# Patient Record
Sex: FEMALE | Race: WHITE | NOT HISPANIC OR LATINO | Employment: FULL TIME | URBAN - METROPOLITAN AREA
[De-identification: names, ages, dates, MRNs, and addresses within clinical notes are randomized per-mention and may not be internally consistent; named-entity substitution may affect disease eponyms.]

---

## 2017-01-26 ENCOUNTER — OFFICE VISIT (OUTPATIENT)
Dept: FAMILY MEDICINE | Facility: CLINIC | Age: 42
End: 2017-01-26
Payer: COMMERCIAL

## 2017-01-26 VITALS
DIASTOLIC BLOOD PRESSURE: 78 MMHG | OXYGEN SATURATION: 97 % | HEART RATE: 69 BPM | BODY MASS INDEX: 43.05 KG/M2 | WEIGHT: 243 LBS | HEIGHT: 63 IN | SYSTOLIC BLOOD PRESSURE: 122 MMHG | TEMPERATURE: 97.7 F

## 2017-01-26 DIAGNOSIS — L30.9 DERMATITIS: Primary | ICD-10-CM

## 2017-01-26 PROCEDURE — 99212 OFFICE O/P EST SF 10 MIN: CPT | Performed by: FAMILY MEDICINE

## 2017-01-26 RX ORDER — PREDNISONE 10 MG/1
TABLET ORAL
Qty: 24 TABLET | Refills: 0 | Status: SHIPPED | OUTPATIENT
Start: 2017-01-26 | End: 2017-03-15

## 2017-01-26 NOTE — MR AVS SNAPSHOT
After Visit Summary   1/26/2017    Jesica Mooney    MRN: [id number]           Patient Information     Date Of Birth          1975        Visit Information        Provider Department      1/26/2017 3:45 PM Cesar Arroyo MD [facility]        Today's Diagnoses     Dermatitis    -  1       Care Instructions    Start taking a daily zyrtec or generic for the itching as needed        Follow-ups after your visit        Your next 10 appointments already scheduled     Dec 04, 2017 11:40 AM   Return Visit with Dylan Donato MD   [facility] [place] ([facility])    [address]  [place] [address] [phone]              Who to contact     If you have questions or need follow up information about today's clinic visit or your schedule please contact [facility] directly at [phone].  Normal or non-critical lab and imaging results will be communicated to you by MyChart, letter or phone within 4 business days after the clinic has received the results. If you do not hear from us within 7 days, please contact the clinic through MyChart or phone. If you have a critical or abnormal lab result, we will notify you by phone as soon as possible.  Submit refill requests through [facility] or call your pharmacy and they will forward the refill request to us. Please allow 3 business days for your refill to be completed.          Additional Information About Your Visit        MyChart Information     [facility] gives you secure access to your electronic health record. If you see a primary care provider, you can also send messages to your care team and make appointments. If you have questions, please call your primary care clinic.  If you do not have a primary care provider, please call [phone] and they will assist you.        Care EveryWhere ID     This is your Care EveryWhere ID. This could be used by other organizations to access your [place]  "medical records  [id number]        Your Vitals Were     Pulse Temperature Height BMI (Body Mass Index) Pulse Oximetry       69 97.7  F (36.5  C) (Oral) 5' 3\" (1.6 m) 43.06 kg/m2 97%        Blood Pressure from Last 3 Encounters:   01/26/17 122/78   12/05/16 135/90   10/17/16 126/84    Weight from Last 3 Encounters:   01/26/17 243 lb (110.224 kg)   12/05/16 240 lb 6.4 oz (109.045 kg)   10/17/16 240 lb 3.2 oz (108.954 kg)              Today, you had the following     No orders found for display         Today's Medication Changes          These changes are accurate as of: 1/26/17  4:13 PM.  If you have any questions, ask your nurse or doctor.               Start taking these medicines.        Dose/Directions    predniSONE 10 MG tablet   Commonly known as:  DELTASONE   Used for:  Dermatitis   Started by:  Cesar Arroyo MD        4 tabs po qd for 3 days  then 2 tabs po qd for 3 days then 1 tab po qd for 3 days and the 1/2 tab po qd for 3 days   Quantity:  24 tablet   Refills:  0            Where to get your medicines      These medications were sent to [address] [address]     Phone:  [phone]    - predniSONE 10 MG tablet             Primary Care Provider Office Phone # Fax #    JUDSON Pizarro [facility] [phone]       [address] [address]        Thank you!     Thank you for choosing [facility]  for your care. Our goal is always to provide you with excellent care. Hearing back from our patients is one way we can continue to improve our services. Please take a few minutes to complete the written survey that you may receive in the mail after your visit with us. Thank you!             Your Updated Medication List - Protect others around you: Learn how to safely use, store and throw away your medicines at www.disposemymeds.org.          This " "list is accurate as of: 1/26/17  4:13 PM.  Always use your most recent med list.                   Brand Name Dispense Instructions for use    levonorgestrel 20 MCG/24HR IUD    MIRENA     1 each by Intrauterine route once       predniSONE 10 MG tablet    DELTASONE    24 tablet    4 tabs po qd for 3 days  then 2 tabs po qd for 3 days then 1 tab po qd for 3 days and the 1/2 tab po qd for 3 days       propylene glycol 0.6 % Soln ophthalmic solution    SYSTANE BALANCE    1 Bottle    Place 1 drop into both eyes 2 times daily \"Systane Balance\"       rivaroxaban ANTICOAGULANT 20 MG Tabs tablet    XARELTO    90 tablet    Take 1 tablet (20 mg) by mouth daily (with dinner) Future refills by Hematologist       SLEEP AID PO            "

## 2017-01-26 NOTE — NURSING NOTE
"Chief Complaint   Patient presents with     Derm Problem       Initial /78 mmHg  Pulse 69  Temp(Src) 97.7  F (36.5  C) (Oral)  Ht 5' 3\" (1.6 m)  Wt 243 lb (110.224 kg)  BMI 43.06 kg/m2  SpO2 97% Estimated body mass index is 43.06 kg/(m^2) as calculated from the following:    Height as of this encounter: 5' 3\" (1.6 m).    Weight as of this encounter: 243 lb (110.224 kg).  BP completed using cuff size: juanjo Bronson CMA   3:54 PM  1/26/2017      "

## 2017-01-26 NOTE — PROGRESS NOTES
"  SUBJECTIVE:                                                    Jesica Mooney is a 41 year old female who presents to clinic today for the following health issues:      Rash all over body ,torso,legs,arms ,back ,chest, belly ,started 01/23/17 ,itchy, ate asian wraps 01/23/17 and 01/24/17  noticed rash with no other sx .    --------------------------------------------------------------------------------------------------------------------------------------    SUBJECTIVE: Jesica is a 41 year old female who complains of \"rash\"   on her knees.  These have been present for about 3 days. It first was noticed on the anterior knees and thighs and then on the chest and back.  The patient reports that there is (are) symptoms associated with these lesions. The symptoms associated with this rash include: itching  The patient can not remember any recent use of any new personal products on her skin. She did eat a new asian wrap several hrs before it started.  The patient can not remember any other recent exposures to her skin.  The patient denies any skin problems similar to this in the past    She took some benadryl.     Past Medical History   Diagnosis Date     NO ACTIVE PROBLEMS      IUD (intrauterine device) in place      Mirena inserted 12/28/15       OBJECTIVE: SKIN: examination of the involved area reveals large patches of involved skin. There are multiple papules and surrounding erythema.         ASSESSMENT: non specific dermatitis     PLAN: The patient was given a prescription for oral prednisone as she is  to be used twice a day. I I would like to have the patient follow up with our dermatologist in  2 weeks if the rash has not improved.      "

## 2017-03-02 DIAGNOSIS — I26.99 OTHER ACUTE PULMONARY EMBOLISM: ICD-10-CM

## 2017-03-15 ENCOUNTER — OFFICE VISIT (OUTPATIENT)
Dept: OPTOMETRY | Facility: CLINIC | Age: 42
End: 2017-03-15
Payer: COMMERCIAL

## 2017-03-15 DIAGNOSIS — H04.123 DRY EYES, BILATERAL: ICD-10-CM

## 2017-03-15 DIAGNOSIS — H52.13 MYOPIA OF BOTH EYES: Primary | ICD-10-CM

## 2017-03-15 DIAGNOSIS — I26.99 OTHER ACUTE PULMONARY EMBOLISM: ICD-10-CM

## 2017-03-15 DIAGNOSIS — M35.00 SJOGREN'S SYNDROME (H): ICD-10-CM

## 2017-03-15 PROCEDURE — 92014 COMPRE OPH EXAM EST PT 1/>: CPT | Performed by: OPTOMETRIST

## 2017-03-15 PROCEDURE — 92015 DETERMINE REFRACTIVE STATE: CPT | Performed by: OPTOMETRIST

## 2017-03-15 ASSESSMENT — REFRACTION_WEARINGRX
OS_SPHERE: -9.00
SPECS_TYPE: SVL
OS_SPHERE: -9.00
OD_SPHERE: -10.00
OD_CYLINDER: +1.25
OD_SPHERE: -9.75
OS_CYLINDER: +1.50
SPECS_TYPE: SVL
OS_AXIS: 075
OS_AXIS: 075
OD_CYLINDER: +1.50
OS_CYLINDER: +1.50
OD_AXIS: 058
OD_AXIS: 060

## 2017-03-15 ASSESSMENT — REFRACTION_MANIFEST
OS_CYLINDER: +2.00
OS_AXIS: 085
OD_SPHERE: -9.75
OD_SPHERE: -9.50
OS_AXIS: 077
OD_CYLINDER: +1.25
OS_SPHERE: -9.50
OD_AXIS: 070
OS_SPHERE: -9.00
OD_CYLINDER: +1.25
METHOD_AUTOREFRACTION: 1
OS_CYLINDER: +1.50
OD_AXIS: 073

## 2017-03-15 ASSESSMENT — KERATOMETRY
OD_AXISANGLE2_DEGREES: 164
OS_AXISANGLE2_DEGREES: 173
OS_K2POWER_DIOPTERS: 46.75
OD_K2POWER_DIOPTERS: 46.25
OD_K1POWER_DIOPTERS: 44.75
OS_K1POWER_DIOPTERS: 45.00

## 2017-03-15 ASSESSMENT — EXTERNAL EXAM - LEFT EYE: OS_EXAM: NORMAL

## 2017-03-15 ASSESSMENT — TONOMETRY
OS_IOP_MMHG: 14
IOP_METHOD: APPLANATION
OD_IOP_MMHG: 14

## 2017-03-15 ASSESSMENT — VISUAL ACUITY
OS_CC: 20/30
OS_CC+: -1
CORRECTION_TYPE: GLASSES
OD_CC: 20/20
OD_CC+: -2
METHOD: SNELLEN - LINEAR
OS_CC: 20/20
OD_CC: 20/30

## 2017-03-15 ASSESSMENT — CUP TO DISC RATIO
OS_RATIO: 0.2
OD_RATIO: 0.2

## 2017-03-15 ASSESSMENT — CONF VISUAL FIELD
OD_NORMAL: 1
OS_NORMAL: 1

## 2017-03-15 ASSESSMENT — SLIT LAMP EXAM - LIDS
COMMENTS: NORMAL
COMMENTS: NORMAL

## 2017-03-15 ASSESSMENT — EXTERNAL EXAM - RIGHT EYE: OD_EXAM: NORMAL

## 2017-03-15 NOTE — PROGRESS NOTES
Chief Complaint   Patient presents with     COMPREHENSIVE EYE EXAM       Patient was diagnosed with Sjogren's since her last appointment. Has seen dentist    Last Eye Exam: 5/14/2015  Dilated Previously: Yes    What are you currently using to see?  Glasses, wears glasses all of the time. Still interested in wearing contacts if there have been any breakthrough improvements that would allow her to do so. Wore RGP's years ago       Distance Vision Acuity: Satisfied with vision, no changes noted.     Near Vision Acuity: Satisfied with vision while reading and using computer with glasses, seems like things are the same all the way around, no changes     Eye Comfort: dry  Do you use eye drops? : Yes: Uses Systane Balance twice a day, also uses warm wash clothes to help clear her tear ducts and moisten her eyes   Occupation or Hobbies: [employer]     [employer] Optometric Assistant           Medical, surgical and family histories reviewed and updated 3/15/2017.       OBJECTIVE: See Ophthalmology exam    ASSESSMENT:    ICD-10-CM    1. Myopia of both eyes H52.13 EYE EXAM (SIMPLE-NONBILLABLE)     REFRACTIVE STATUS   2. Sjogren's syndrome (H) M35.00 EYE EXAM (SIMPLE-NONBILLABLE)     REFRACTIVE STATUS   3. Other acute pulmonary embolism (H) I26.99 EYE EXAM (SIMPLE-NONBILLABLE)     REFRACTIVE STATUS   4. Dry eyes, bilateral H04.123 EYE EXAM (SIMPLE-NONBILLABLE)     REFRACTIVE STATUS     propylene glycol (SYSTANE BALANCE) 0.6 % SOLN ophthalmic solution      PLAN:   Will use warm compress, then clean lids, use artificial tears   Patient Instructions   Patient was advised of today's exam findings.  Optional to fill new glasses prescription, minimal change  Continue use over the counter artificial tears 2 times a day  Systane Balance  (white liquid) or Refresh Optive Advanced).  These are oil based.  Return in 1 year for eye exam    OCuSOFT HypoChlor 0.02% Eyelid and Eyelash Spray 2 fl. OZ -Available at [facility]  [facility]armacy  Use twice daily.  Spray cotton swab or cotton pad 3 times, rub gently 3 times along upper and then 3 times along lower lashes and then both lids. Use new applicator for other eye.        Minnie Akers O.D.  [facility]   [address], [place] [id number] [phone]

## 2017-03-15 NOTE — PATIENT INSTRUCTIONS
Patient was advised of today's exam findings.  Optional to fill new glasses prescription, minimal change  Continue use over the counter artificial tears 2 times a day  Systane Balance  (white liquid) or Refresh Optive Advanced).  These are oil based.  Return in 1 year for eye exam    OCuSOFT HypoChlor 0.02% Eyelid and Eyelash Spray 2 fl. OZ -Available at [facility]  Use twice daily.  Spray cotton swab or cotton pad 3 times, rub gently 3 times along upper and then 3 times along lower lashes and then both lids. Use new applicator for other eye.        Minnie Akers O.D.  [facility]   [address] [place] [address] [phone]

## 2017-05-20 ENCOUNTER — MYC MEDICAL ADVICE (OUTPATIENT)
Dept: RHEUMATOLOGY | Facility: CLINIC | Age: 42
End: 2017-05-20

## 2017-05-22 NOTE — TELEPHONE ENCOUNTER
Huddled with Dr. Donato: warm towel and massage, make sure she is drinking lots of water. If worsens next step would see ENT.    [facility]    Brittanie Rouse CMA  5/22/2017 5:00 PM

## 2017-05-27 ENCOUNTER — RADIANT APPOINTMENT (OUTPATIENT)
Dept: MAMMOGRAPHY | Facility: CLINIC | Age: 42
End: 2017-05-27
Attending: NURSE PRACTITIONER
Payer: COMMERCIAL

## 2017-05-27 DIAGNOSIS — Z12.31 VISIT FOR SCREENING MAMMOGRAM: ICD-10-CM

## 2017-05-27 PROCEDURE — G0202 SCR MAMMO BI INCL CAD: HCPCS | Mod: TC

## 2017-11-27 DIAGNOSIS — M35.00 SJOGREN'S SYNDROME (H): ICD-10-CM

## 2017-11-27 LAB
ALBUMIN SERPL-MCNC: 3.8 G/DL (ref 3.4–5)
ALBUMIN UR-MCNC: NEGATIVE MG/DL
ALP SERPL-CCNC: 65 U/L (ref 40–150)
ALT SERPL W P-5'-P-CCNC: 42 U/L (ref 0–50)
ANION GAP SERPL CALCULATED.3IONS-SCNC: 3 MMOL/L (ref 3–14)
APPEARANCE UR: CLEAR
AST SERPL W P-5'-P-CCNC: 26 U/L (ref 0–45)
BASOPHILS # BLD AUTO: 0 10E9/L (ref 0–0.2)
BASOPHILS NFR BLD AUTO: 0.5 %
BILIRUB SERPL-MCNC: 0.4 MG/DL (ref 0.2–1.3)
BILIRUB UR QL STRIP: NEGATIVE
BUN SERPL-MCNC: 14 MG/DL (ref 7–30)
C3 SERPL-MCNC: 98 MG/DL (ref 76–169)
C4 SERPL-MCNC: 21 MG/DL (ref 15–50)
CALCIUM SERPL-MCNC: 9.2 MG/DL (ref 8.5–10.1)
CHLORIDE SERPL-SCNC: 105 MMOL/L (ref 94–109)
CO2 SERPL-SCNC: 29 MMOL/L (ref 20–32)
COLOR UR AUTO: YELLOW
CREAT SERPL-MCNC: 0.9 MG/DL (ref 0.52–1.04)
CREAT UR-MCNC: 36 MG/DL
CRP SERPL-MCNC: <2.9 MG/L (ref 0–8)
DIFFERENTIAL METHOD BLD: NORMAL
EOSINOPHIL # BLD AUTO: 0.5 10E9/L (ref 0–0.7)
EOSINOPHIL NFR BLD AUTO: 8.1 %
ERYTHROCYTE [DISTWIDTH] IN BLOOD BY AUTOMATED COUNT: 12.5 % (ref 10–15)
ERYTHROCYTE [SEDIMENTATION RATE] IN BLOOD BY WESTERGREN METHOD: 15 MM/H (ref 0–20)
GFR SERPL CREATININE-BSD FRML MDRD: 68 ML/MIN/1.7M2
GLUCOSE SERPL-MCNC: 88 MG/DL (ref 70–99)
GLUCOSE UR STRIP-MCNC: NEGATIVE MG/DL
HCT VFR BLD AUTO: 45 % (ref 35–47)
HGB BLD-MCNC: 15.2 G/DL (ref 11.7–15.7)
HGB UR QL STRIP: ABNORMAL
KETONES UR STRIP-MCNC: NEGATIVE MG/DL
LEUKOCYTE ESTERASE UR QL STRIP: NEGATIVE
LYMPHOCYTES # BLD AUTO: 2.3 10E9/L (ref 0.8–5.3)
LYMPHOCYTES NFR BLD AUTO: 38.1 %
MCH RBC QN AUTO: 30.5 PG (ref 26.5–33)
MCHC RBC AUTO-ENTMCNC: 33.8 G/DL (ref 31.5–36.5)
MCV RBC AUTO: 90 FL (ref 78–100)
MONOCYTES # BLD AUTO: 0.5 10E9/L (ref 0–1.3)
MONOCYTES NFR BLD AUTO: 8.8 %
NEUTROPHILS # BLD AUTO: 2.6 10E9/L (ref 1.6–8.3)
NEUTROPHILS NFR BLD AUTO: 44.5 %
NITRATE UR QL: NEGATIVE
NON-SQ EPI CELLS #/AREA URNS LPF: ABNORMAL /LPF
PH UR STRIP: 6 PH (ref 5–7)
PLATELET # BLD AUTO: 243 10E9/L (ref 150–450)
POTASSIUM SERPL-SCNC: 4 MMOL/L (ref 3.4–5.3)
PROT SERPL-MCNC: 8.5 G/DL (ref 6.8–8.8)
PROT UR-MCNC: <0.05 G/L
PROT/CREAT 24H UR: NORMAL G/G CR (ref 0–0.2)
RBC # BLD AUTO: 4.99 10E12/L (ref 3.8–5.2)
RBC #/AREA URNS AUTO: ABNORMAL /HPF
SODIUM SERPL-SCNC: 137 MMOL/L (ref 133–144)
SOURCE: ABNORMAL
SP GR UR STRIP: <=1.005 (ref 1–1.03)
UROBILINOGEN UR STRIP-ACNC: 0.2 EU/DL (ref 0.2–1)
WBC # BLD AUTO: 5.9 10E9/L (ref 4–11)
WBC #/AREA URNS AUTO: ABNORMAL /HPF

## 2017-11-27 PROCEDURE — 36415 COLL VENOUS BLD VENIPUNCTURE: CPT | Performed by: INTERNAL MEDICINE

## 2017-11-27 PROCEDURE — 80053 COMPREHEN METABOLIC PANEL: CPT | Performed by: INTERNAL MEDICINE

## 2017-11-27 PROCEDURE — 84156 ASSAY OF PROTEIN URINE: CPT | Performed by: INTERNAL MEDICINE

## 2017-11-27 PROCEDURE — 85025 COMPLETE CBC W/AUTO DIFF WBC: CPT | Performed by: INTERNAL MEDICINE

## 2017-11-27 PROCEDURE — 81001 URINALYSIS AUTO W/SCOPE: CPT | Performed by: INTERNAL MEDICINE

## 2017-11-27 PROCEDURE — 86160 COMPLEMENT ANTIGEN: CPT | Mod: 91 | Performed by: INTERNAL MEDICINE

## 2017-11-27 PROCEDURE — 86160 COMPLEMENT ANTIGEN: CPT | Performed by: INTERNAL MEDICINE

## 2017-11-27 PROCEDURE — 86140 C-REACTIVE PROTEIN: CPT | Performed by: INTERNAL MEDICINE

## 2017-11-27 PROCEDURE — 85652 RBC SED RATE AUTOMATED: CPT | Performed by: INTERNAL MEDICINE

## 2017-12-04 ENCOUNTER — OFFICE VISIT (OUTPATIENT)
Dept: RHEUMATOLOGY | Facility: CLINIC | Age: 42
End: 2017-12-04
Payer: COMMERCIAL

## 2017-12-04 VITALS
WEIGHT: 245.8 LBS | DIASTOLIC BLOOD PRESSURE: 84 MMHG | SYSTOLIC BLOOD PRESSURE: 126 MMHG | HEART RATE: 78 BPM | OXYGEN SATURATION: 95 % | HEIGHT: 63 IN | BODY MASS INDEX: 43.55 KG/M2

## 2017-12-04 DIAGNOSIS — M35.01 SJOGREN'S SYNDROME WITH KERATOCONJUNCTIVITIS SICCA (H): Primary | ICD-10-CM

## 2017-12-04 DIAGNOSIS — Z12.83 SKIN CANCER SCREENING: ICD-10-CM

## 2017-12-04 PROCEDURE — 99214 OFFICE O/P EST MOD 30 MIN: CPT | Performed by: INTERNAL MEDICINE

## 2017-12-04 RX ORDER — PILOCARPINE HYDROCHLORIDE 5 MG/1
TABLET, FILM COATED ORAL
Qty: 90 TABLET | Refills: 6 | Status: SHIPPED | OUTPATIENT
Start: 2017-12-04 | End: 2018-12-03

## 2017-12-04 NOTE — PROGRESS NOTES
Rheumatology Clinic Visit      Jesica Mooney MRN# [id number]   YOB: 1975 Age: 42 year old      Date of visit: 12/04/17   Hematology/Oncology: Dr. Flavia Leon and Dr. Ata Rendon  PCP: Brenda Graves    Chief Complaint   Patient presents with:  RECHECK: patient states she has a few symptoms she would like to discuss with you      Assessment and Plan     1. Sjogren's Syndrome (JAN >1:82815, SSA >8):  She has punctal plugs currently; restasis was reportedly ineffective when she used a sample from her ophthalmologist.  She will continue following with her ophthalmologist.  We discussed pilocarpine in the past and she would like to try it today; reviewed the risks and side effects again today that include, but are not limited to, GI upset, increased urinary frequency, diaphoresis, flushing, and decreased visual acuity particularly at night.   - Start pilocarpine 5mg daily x7days, then 5mg BID x7days, then 5mg TID thereafter.  Increase to TID for symptoms but limited by side effects.    - Labs 1 week prior to f/u: CBC, CMP, UA, Uprotein:creatinine    2. Pulmonary Embolism: Diagnosed 12/2015 and is on anticoagulation.  Followed by Dr. Leon (heme/onc) and Dr. Rendon (heme/onc)    3. Skin cancer screening: Many moles; autoimmune disease.  Reasonable to have skin cancer screening exam.  - Dermatology referral    Ms. Mooney verbalized agreement with and understanding of the rational for the diagnosis and treatment plan.  All questions were answered to best of my ability and the patient's satisfaction. Ms. Mooney was advised to contact the clinic with any questions that may arise after the clinic visit.      Thank you for involving me in the care of the patient    Return to clinic: 1 year, earlier PRN    HPI   Jesica Mooney is a 42 year old female with history of pulmonary embolism and obesity who presents for f/u of Sjogren's Syndrome.     Today, Ms. Mooney reports that she has  been doing well. She continues to use frequent sips of water and does not have frequent dental caries. Dry eyes are managed by ophthalmology. She would like to try using pilocarpine. she also notes having dry skin and twice spasm of her anal sphincter that started and resolved spontaneously.    Denies fevers, chills, nausea, vomiting, constipation, diarrhea. No abdominal pain. No chest pain/pressure, palpitations, or shortness of breath. No oral or nasal sores. No neck pain. No rash.      Tobacco: None  EtOH: None  Drugs: None    ROS   GEN: No fevers, chills, or night sweats  SKIN: No itching, rashes, sores  HEENT:  No oral or nasal ulcers.  CV: No chest pain, pressure, palpitations, or dyspnea on exertion.  PULM: No SOB, wheeze, cough.  GI: No nausea, vomiting, constipation, diarrhea. No blood in stool. No abdominal pain.  : No blood in urine.  MSK: See HPI.  NEURO: No numbness, tingling, or weakness.  EXT: No LE swelling    Active Problem List     Patient Active Problem List   Diagnosis     BMI 40.0-44.9, adult (H)     CARDIOVASCULAR SCREENING; LDL GOAL LESS THAN 160     Other acute pulmonary embolism     Sjogren's syndrome (H)     Past Medical History     Past Medical History:   Diagnosis Date     IUD (intrauterine device) in place     Mirena inserted 12/28/15     NO ACTIVE PROBLEMS      Past Surgical History     Past Surgical History:   Procedure Laterality Date     C ORAL SURGERY PROCEDURE  10/1990    wisdom teeth extracted     Allergy     Allergies   Allergen Reactions     Keflex [Cephalexin Hcl] Hives     Penicillins Hives     Current Medication List     Current Outpatient Prescriptions   Medication Sig     rivaroxaban ANTICOAGULANT (XARELTO) 20 MG TABS tablet Take 1 tablet (20 mg) by mouth daily (with dinner)     Doxylamine Succinate, Sleep, (SLEEP AID PO)      levonorgestrel (MIRENA) 20 MCG/24HR IUD 1 each by Intrauterine route once     propylene glycol (SYSTANE BALANCE) 0.6 % SOLN ophthalmic solution  "Place 1 drop into both eyes 2 times daily \"Systane Balance\" (Patient not taking: Reported on 12/4/2017)     No current facility-administered medications for this visit.          Social History   See HPI    Family History     Family History   Problem Relation Age of Onset     Eye Disorder Mother      cataracts      Rheumatoid Arthritis Mother      Cancer - colorectal Paternal Grandmother      ?     Thyroid Disease Father      Breast Cancer Maternal Aunt      diagnosed in 30s (mother's 1/2 sister)     CANCER No family hx of      DIABETES No family hx of      Hypertension No family hx of      CEREBROVASCULAR DISEASE No family hx of      Macular Degeneration No family hx of      Glaucoma No family hx of      Mother: RA    Physical Exam     Temp Readings from Last 3 Encounters:   01/26/17 97.7  F (36.5  C) (Oral)   10/17/16 98.9  F (37.2  C) (Oral)   08/29/16 97.9  F (36.6  C) (Oral)     BP Readings from Last 5 Encounters:   12/04/17 126/84   01/26/17 122/78   12/05/16 135/90   10/17/16 126/84   08/29/16 125/83     Pulse Readings from Last 1 Encounters:   12/04/17 78     Resp Readings from Last 1 Encounters:   06/02/16 16     Estimated body mass index is 43.54 kg/(m^2) as calculated from the following:    Height as of this encounter: 1.6 m (5' 3\").    Weight as of this encounter: 111.5 kg (245 lb 12.8 oz).    GEN: NAD, overweight, chewing gum  HEENT: Dry mucous membranes. No oral lesions. Anicteric, noninjected sclera. Eyes appear dry by gross examination.  CV: S1, S2. RRR. No m/r/g.  PULM: CTA bilaterally. No w/c.  MSK:  Hands, wrists, and elbows without synovial swelling, increased warmth, tenderness to palpation, or overlying erythema.  Negative MCP squeeze.  Normal  strength. Bilateral shoulders nontender to palpation and without swelling.  Bilateral hips nontender to palpation. Knees, ankles, and feet without swelling or tenderness to palpation.  Negative MTP squeeze. Spine nontender to palpation.  NEURO: UE and " LE strengths 5/5 and equal bilaterally.   SKIN: No rash. Many moles on the bilateral forearms  EXT: No LE edema  PSYCH: Alert. Appropriate.    Labs / Imaging (select studies)     JAN  Recent Labs   Lab Test  05/24/16   0822 04/14/16   1447  01/12/16   1457   AVA   --   10.9*  11.3*   ANAIGG  >1:32037  Reference range: <1:40  (Note)  Mixed Homogeneous and Speckled pattern.  INTERPRETIVE INFORMATION: JAN by IFA, IgG  Anti-nuclear antibodies (JAN) are seen in a variety of  systemic rheumatic diseases and are determined by indirect  fluorescence assay (IFA) using HEp-2 substrate with an  IgG-specific conjugate. JAN titers less than or equal to  1:80 have variable relevance while titers greater than or  equal to 1:160 are considered clinically significant. These  antibodies may precede clinical disease onset; however,  healthy individuals and those with advanced age have been  reported to be positive for JAN. When observed, one of the  five basic patterns is reported: homogeneous,  peripheral/rim, speckled, centromere, or nucleolar. If  cytoplasmic fluorescence is observed, it is noted. IFA  methodology is subjective and has occasionally been shown  to lack sensitivity for anti-SSA/Ro antibodies.  Negative results do not necessarily rule out the presence  of SSc. If clinical nelson spicion remains, consider further  testing for U3-RNP, PM/Scl, or Th/To antibodies associated  with SSc.  Performed by [facility],  [address] [place] [address] [phone]  [email or web address], Charles Kumar MD, Lab. Director     --    --      RNP/Sm/SSA/SSB  Recent Labs   Lab Test  05/24/16 0822   RNPIGG  0.9   SMIGG  <0.2  Negative   Antibody index (AI) values reflect qualitative changes in antibody   concentration that cannot be directly associated with clinical condition or   disease state.     SSAIGG  >8.0  Positive   Antibody index (AI) values reflect qualitative changes in antibody   concentration that cannot be directly  associated with clinical condition or   disease state.  *   SSBIGG  0.3   SCLIGG  <0.2  Negative   Antibody index (AI) values reflect qualitative changes in antibody   concentration that cannot be directly associated with clinical condition or   disease state.       dsDNA  Recent Labs   Lab Test  11/25/16   1512  05/24/16   0822   DNA  5  6     C3/C4  Recent Labs   Lab Test  11/27/17   0846  11/25/16   1512  05/24/16   0822   [id number]  98  98  99   R4WIDZZ  21  21  19     Antiphospholipid Antibodies  Recent Labs   Lab Test  04/14/16   1447  01/12/16   1457   B2GPG   --   2.1   B2GPM   --   <0.9  Negative     CARG  <15.0  Interpretation:  Negative    <15.0  Interpretation:  Negative     DEEPTI  <12.5  Interpretation:  Negative    14.7*   LUPINT   --   Negative  (Note)  COMMENTS:  The INR is normal.  APTT ratio is elevated.  Platelet Neutralization is negative.  APTT 1:2 Mix ratio is normal.  DRVVT Screen ratio is normal.  Thrombin time is normal.  NEGATIVE TEST; A LUPUS ANTICOAGULANT WAS NOT DETECTED IN THIS  SPECIMEN WITHIN THE LIMITS OF THE TESTING REPERTOIRE.  If the clinical picture is strongly suggestive of an antiphospholipid  syndrome, recommend anticardiolipin and beta-2-glycoprotein (IgG and  IgM) antibody tests.  Platelet Neutralization and APTT 1:2 Mix ratio are suggestive of  factor deficiency.  Recommend factors 8, 9, 11 and 12 (factors VIII, IX, XI, and XII)  levels if clinically indicated.  Nimisha Lovell M.D.  [phone]  1/15/2016    INR =  1.06    Reference range: 0.86-1.14  Thrombin Time= 16.5    Reference range: 13.0-19.0 sec    APTT:       Ratio  Patient  =  1.21  1:2 Mix  =  1.05  Reference:  Negative: Less than or equal to 1.16  Positive: Greater than or equal to 1.17    Platelet Neutralization (second s):  PTT Buffer-PTT Platelet Lysate =  0  Reference:  Negative: Less than or equal to 0  Positive: Greater than or equal to 1     DILUTE BRANDY VIPER VENOM TEST:  Screen Ratio = 1.15    Normal is less than 1.21         CBC  Recent Labs   Lab Test  11/27/17   0846  11/25/16   1512  05/24/16   0822   WBC  5.9  6.8  6.1   RBC  4.99  4.70  4.55   HGB  15.2  14.3  14.0   HCT  45.0  42.1  41.1   MCV  90  90  90   RDW  12.5  12.3  12.5   PLT  243  252  229   MCH  30.5  30.4  30.8   MCHC  33.8  34.0  34.1   NEUTROPHIL  44.5  38.2  46.8   LYMPH  38.1  40.0  35.7   MONOCYTE  8.8  8.8  8.9   EOSINOPHIL  8.1  12.3  7.9   BASOPHIL  0.5  0.7  0.7   ANEU  2.6  2.6  2.8   ALYM  2.3  2.7  2.2   CARLY  0.5  0.6  0.5   AEOS  0.5  0.8*  0.5   ABAS  0.0  0.1  0.0     CMP  Recent Labs   Lab Test  11/27/17   0846  11/25/16   1512  05/24/16   0822  01/12/16   1457 12/10/15  08/26/13   0750  04/30/12   0836   NA  137  139  138   --    --    --    --    POTASSIUM  4.0  3.8  4.0   --    --    --    --    CHLORIDE  105  105  106   --    --    --    --    CO2  29  30  26   --    --    --    --    ANIONGAP  3  4  6   --    --    --    --    GLC  88  97  92   --    --   83  83   BUN  14  12  14   --    --    --    --    CR  0.90  0.98  0.78   --   0.85  0.85   --    --    GFRESTIMATED  68  62  81   --    --    --    --    GFRESTBLACK  82  76  >90   GFR Calc     --    --    --    --    BROOKS  9.2  8.9  9.1   --    --    --    --    BILITOTAL  0.4  0.4  0.5  0.4   --    --    --    ALBUMIN  3.8  3.6  3.4  3.7   --    --    --    PROTTOTAL  8.5  8.1  7.8  8.2   --    --    --    ALKPHOS  65  66  63  71   --    --    --    AST  26  30  28  19   --    --    --    ALT  42  56*  38  38   --    --    --      Calcium/VitaminD  Recent Labs   Lab Test  11/27/17   0846  11/25/16   1512  05/24/16   0822   BROOKS  9.2  8.9  9.1     ESR/CRP  Recent Labs   Lab Test  11/27/17   0846  11/25/16   1512  05/24/16   0822   SED  15  23*  17   CRP  <2.9  <2.9  <2.9     CK/Aldolase  Recent Labs   Lab Test  11/25/16   1512  05/24/16   0822   CKT  96  72     TSH/T4  Recent Labs   Lab Test  08/26/13   0750  04/30/12   0836   TSH  1.71  2.80      UA  Recent Labs   Lab Test  11/27/17   0856  11/25/16   1609  05/24/16   0805   COLOR  Yellow  Yellow  Yellow   APPEARANCE  Clear  Clear  Clear   URINEGLC  Negative  Negative  Negative   URINEBILI  Negative  Negative  Negative   SG  <=1.005  1.010  1.020   URINEPH  6.0  6.0  5.5   PROTEIN  Negative  Negative  Negative   UROBILINOGEN  0.2  0.2  0.2   NITRITE  Negative  Negative  Negative   UBLD  Small*  Trace*  Trace*   LEUKEST  Negative  Negative  Negative   WBCU  O - 2  O - 2  2-5*   RBCU  2-5*  O - 2  2-5*   SQUAMOUSEPI  Few  Few   --      Urine Microscopic  Recent Labs   Lab Test  11/27/17   0856  11/25/16   1609  05/24/16   0805   WBCU  O - 2  O - 2  2-5*   RBCU  2-5*  O - 2  2-5*   SQUAMOUSEPI  Few  Few   --      Urine Protein  Recent Labs   Lab Test  11/27/17   0856  11/25/16   1609  05/24/16   0805   UTP  <0.05  <0.05  <0.05   UTPG  Unable to calculate due to low value  Unable to calculate due to low value  Unable to calculate due to low value   UCRR  36   --    --      Immunization History     Immunization History   Administered Date(s) Administered     Influenza (IIV3) PF 10/02/2012     Influenza Intranasal Vaccine 4 valent 10/11/2013, 10/16/2014, 10/11/2016     Influenza Vaccine IM 3yrs+ 4 Valent IIV4 10/05/2015, 10/13/2017     Tdap (Adacel,Boostrix) 01/17/2008     Twinrix A/B 12/17/2012, 01/16/2013, 06/18/2013          Chart documentation done in part with Dragon Voice recognition Software. Although reviewed after completion, some word and grammatical error may remain.    Dylan Donato MD

## 2017-12-04 NOTE — NURSING NOTE
"Chief Complaint   Patient presents with     RECHECK     patient states she has a few symptoms she would like to discuss with you       Initial /84 (BP Location: Left arm, Patient Position: Chair, Cuff Size: Adult Large)  Pulse 78  Ht 1.6 m (5' 3\")  Wt 111.5 kg (245 lb 12.8 oz)  SpO2 95%  BMI 43.54 kg/m2 Estimated body mass index is 43.54 kg/(m^2) as calculated from the following:    Height as of this encounter: 1.6 m (5' 3\").    Weight as of this encounter: 111.5 kg (245 lb 12.8 oz).  BP completed using cuff size: large         RAPID3 (0-30) Cumulative Score  0.5          RAPID3 Weighted Score (divide #4 by 3 and that is the weighted score)  0.16         "

## 2017-12-04 NOTE — MR AVS SNAPSHOT
After Visit Summary   12/4/2017    Jesica Mooney    MRN: [id number]           Patient Information     Date Of Birth          1975        Visit Information        Provider Department      12/4/2017 11:40 AM Dylan Donato MD [facility]        Today's Diagnoses     Sjogren's syndrome with keratoconjunctivitis sicca (H)    -  1    Skin cancer screening           Follow-ups after your visit        Additional Services     DERMATOLOGY REFERRAL       Your provider has referred you to: [facility]: [facility] [place] [facility] [phone]   [email or web address]    General skin cancer screening    Please be aware that coverage of these services is subject to the terms and limitations of your health insurance plan.  Call member services at your health plan with any benefit or coverage questions.      Please bring the following with you to your appointment:    (1) Any X-Rays, CTs or MRIs which have been performed.  Contact the facility where they were done to arrange for  prior to your scheduled appointment.    (2) List of current medications  (3) This referral request   (4) Any documents/labs given to you for this referral                  Your next 10 appointments already scheduled     Nov 26, 2018  8:30 AM CST   [facility] LAB   [facility]    [address] [address] [phone]           Please do not eat 10-12 hours before your appointment if you are coming in fasting for labs on lipids, cholesterol, or glucose (sugar). This does not apply to pregnant women. Water, hot tea and black coffee (with nothing added) are okay. Do not drink other fluids, diet soda or chew gum.            Dec 03, 2018 11:40 AM CST   Return Visit with Dylan Donato MD   [facility] [place] [facility] ([facility] [place])    [address]  [place] [address]  "  [phone]              Future tests that were ordered for you today     Open Future Orders        Priority Expected Expires Ordered    CBC with platelets differential Routine 11/20/2018 12/4/2018 12/4/2017    Comprehensive metabolic panel Routine 11/20/2018 12/4/2018 12/4/2017    Protein  random urine with Creat Ratio Routine 11/20/2018 12/4/2018 12/4/2017    UA with Microscopic reflex to Culture Routine 11/20/2018 12/4/2018 12/4/2017            Who to contact     If you have questions or need follow up information about today's clinic visit or your schedule please contact [facility] directly at [phone].  Normal or non-critical lab and imaging results will be communicated to you by Com[employer].sehart, letter or phone within 4 business days after the clinic has received the results. If you do not hear from us within 7 days, please contact the clinic through [employer] or phone. If you have a critical or abnormal lab result, we will notify you by phone as soon as possible.  Submit refill requests through [employer] or call your pharmacy and they will forward the refill request to us. Please allow 3 business days for your refill to be completed.          Additional Information About Your Visit        [employer] Information     [employer] gives you secure access to your electronic health record. If you see a primary care provider, you can also send messages to your care team and make appointments. If you have questions, please call your primary care clinic.  If you do not have a primary care provider, please call [phone] and they will assist you.        Care EveryWhere ID     This is your Care EveryWhere ID. This could be used by other organizations to access your [facility] medical records  [id number]        Your Vitals Were     Pulse Height Pulse Oximetry BMI (Body Mass Index)          78 1.6 m (5' 3\") 95% 43.54 kg/m2         Blood Pressure from Last 3 Encounters:   12/04/17 126/84   01/26/17 122/78   12/05/16 " 135/90    Weight from Last 3 Encounters:   12/04/17 111.5 kg (245 lb 12.8 oz)   01/26/17 110.2 kg (243 lb)   12/05/16 109 kg (240 lb 6.4 oz)              We Performed the Following     DERMATOLOGY REFERRAL          Today's Medication Changes          These changes are accurate as of: 12/4/17 12:17 PM.  If you have any questions, ask your nurse or doctor.               Start taking these medicines.        Dose/Directions    pilocarpine 5 MG tablet   Commonly known as:  SALAGEN   Used for:  Sjogren's syndrome with keratoconjunctivitis sicca (H)   Started by:  Dylan Donato MD        Pilocarpine 5mg daily x7days, then 5mg BID x7days, then 5mg TID thereafter.  Increase for symptoms but limited by side effects.   Quantity:  90 tablet   Refills:  6            Where to get your medicines      These medications were sent to [facility] [address] [place] [address] [facility] [place] [address] [facility] [address]     Phone:  [phone]     pilocarpine 5 MG tablet                Primary Care Provider Office Phone # Fax #    [facility] [phone]       No address on file        Equal Access to Services     ELKE LOPEZ : Kashif handyo Soomaali, waaxda luqadaha, qaybta kaalmada adeegyada, buck mix. So [facility] [phone].    ATENCIÓN: Si habla español, tiene a nelson disposición servicios gratuitos de asistencia lingüística. Llame al [phone].    We comply with applicable federal civil rights laws and [place] laws. We do not discriminate on the basis of race, color, national origin, age, disability, sex, sexual orientation, or gender identity.            Thank you!     Thank you for choosing [facility]  for your care. Our goal is always to provide you with excellent care. Hearing back from our patients is one way we can continue to improve our services. Please take a few minutes to complete the  "written survey that you may receive in the mail after your visit with us. Thank you!             Your Updated Medication List - Protect others around you: Learn how to safely use, store and throw away your medicines at www.disposemFirst Windeds.org.          This list is accurate as of: 12/4/17 12:17 PM.  Always use your most recent med list.                   Brand Name Dispense Instructions for use Diagnosis    levonorgestrel 20 MCG/24HR IUD    MIRENA     1 each by Intrauterine route once        pilocarpine 5 MG tablet    SALAGEN    90 tablet    Pilocarpine 5mg daily x7days, then 5mg BID x7days, then 5mg TID thereafter.  Increase for symptoms but limited by side effects.    Sjogren's syndrome with keratoconjunctivitis sicca (H)       propylene glycol 0.6 % Soln ophthalmic solution    SYSTANE BALANCE    1 Bottle    Place 1 drop into both eyes 2 times daily \"Systane Balance\"    Dry eyes, bilateral       rivaroxaban ANTICOAGULANT 20 MG Tabs tablet    XARELTO    90 tablet    Take 1 tablet (20 mg) by mouth daily (with dinner)    Other acute pulmonary embolism       SLEEP AID PO       JAN positive         "

## 2018-02-19 DIAGNOSIS — I26.99 PULMONARY EMBOLISM (H): Primary | ICD-10-CM

## 2018-02-20 RX ORDER — RIVAROXABAN 20 MG/1
TABLET, FILM COATED ORAL
Qty: 90 TABLET | Refills: 0 | Status: SHIPPED | OUTPATIENT
Start: 2018-02-20 | End: 2018-05-21

## 2018-02-21 ENCOUNTER — MYC MEDICAL ADVICE (OUTPATIENT)
Dept: RHEUMATOLOGY | Facility: CLINIC | Age: 43
End: 2018-02-21

## 2018-03-13 ENCOUNTER — OFFICE VISIT (OUTPATIENT)
Dept: HEMATOLOGY | Facility: CLINIC | Age: 43
End: 2018-03-13
Attending: PHYSICIAN ASSISTANT
Payer: COMMERCIAL

## 2018-03-13 VITALS
OXYGEN SATURATION: 97 % | HEART RATE: 67 BPM | TEMPERATURE: 98.3 F | RESPIRATION RATE: 12 BRPM | WEIGHT: 247.3 LBS | DIASTOLIC BLOOD PRESSURE: 85 MMHG | HEIGHT: 63 IN | SYSTOLIC BLOOD PRESSURE: 124 MMHG | BODY MASS INDEX: 43.82 KG/M2

## 2018-03-13 DIAGNOSIS — M35.00 SJOGREN'S SYNDROME, WITH UNSPECIFIED ORGAN INVOLVEMENT (H): ICD-10-CM

## 2018-03-13 DIAGNOSIS — D68.59 ANTITHROMBIN DEFICIENCY (H): ICD-10-CM

## 2018-03-13 DIAGNOSIS — Z86.711 HISTORY OF PULMONARY EMBOLISM: ICD-10-CM

## 2018-03-13 DIAGNOSIS — Z79.01 CHRONIC ANTICOAGULATION: Primary | ICD-10-CM

## 2018-03-13 PROCEDURE — 99214 OFFICE O/P EST MOD 30 MIN: CPT | Performed by: PHYSICIAN ASSISTANT

## 2018-03-13 PROCEDURE — G0463 HOSPITAL OUTPT CLINIC VISIT: HCPCS

## 2018-03-13 ASSESSMENT — PAIN SCALES - GENERAL: PAINLEVEL: NO PAIN (0)

## 2018-03-13 NOTE — NURSING NOTE
Teaching Flowsheet   Relevant Diagnosis: on long term anticoagulation with Xarelto, history of PE  Teaching Topic: Basics of thrombosis, treatment options and introduction to the [facility]     Person(s) involved in teaching:   Patient     Motivation Level:  Asks Questions: Yes    Eager to Learn: Yes  Cooperative: Yes  Receptive (willing/able to accept information): Yes     Patient demonstrates understanding of the following:  Reason for the appointment, diagnosis and treatment plan: Yes  Knowledge of proper use of medications and conditions for which they are ordered (with special attention to potential side effects or drug interactions): Yes  Which situations necessitate calling provider and whom to contact: Yes      Proper use and care of   (medical equip, care aids, etc.): NA  Nutritional needs and diet plan: NA  Pain management techniques: NA  Wound Care: NA  How and/when to access community resources: Yes    Reviewed and reconciled allergies and medications with patient.    Reviewed with patient the signs, symptoms of and risk factors for venous thrombosis (VTE) and provided an educational  book rosalind, which reviews these facts. And includes the following web links  for further information:  www.stoptheclot.org, www.clotconnect.org.     Patient educated about benefits and potential complications of anticoagulation.        Discussed the differences between arterial and venous thrombosis with the patient and the divergent medications used for each.    Patient verbalized understanding of the above information.    Patient given the contact card for the [facility] and has the appropriate numbers to call with any questions.    Jennifer Taveras RN - Nurse Clinician - [facility] - [phone]

## 2018-03-13 NOTE — MR AVS SNAPSHOT
After Visit Summary   3/13/2018    Jesica Mooney    MRN: [id number]           Patient Information     Date Of Birth          1975        Visit Information        Provider Department      3/13/2018 11:30 AM Vijay Jerome PA-C [facility]        Today's Diagnoses     Chronic anticoagulation    -  1    Sjogren's syndrome, with unspecified organ involvement (H)        Antithrombin deficiency (H)        History of pulmonary embolism           Follow-ups after your visit        Follow-up notes from your care team     Return in about 1 year (around 3/13/2019).      Your next 10 appointments already scheduled     Nov 26, 2018  8:30 AM CST   LAB with AN LAB   [facility] ([facility])    [address] [address] [phone]           Please do not eat 10-12 hours before your appointment if you are coming in fasting for labs on lipids, cholesterol, or glucose (sugar). This does not apply to pregnant women. Water, hot tea and black coffee (with nothing added) are okay. Do not drink other fluids, diet soda or chew gum.            Dec 03, 2018 11:40 AM CST   Return Visit with Dylan Donato MD   [facility] ([facility])    [address] [address] [phone]            Mar 12, 2019 11:30 AM CDT   RETURN CLOTTING DISORDER with Vijay Jerome PA-C   [facility] ([facility])    [facility] [place] [address] [phone]              Who to contact     If you have questions or need follow up information about today's clinic visit or your schedule please contact [facility] directly at [phone].  Normal or non-critical lab and imaging results will be communicated to you by MyChart, letter or phone within 4 business days after the clinic has  "received the results. If you do not hear from us within 7 days, please contact the clinic through [employer] or phone. If you have a critical or abnormal lab result, we will notify you by phone as soon as possible.  Submit refill requests through [employer] or call your pharmacy and they will forward the refill request to us. Please allow 3 business days for your refill to be completed.          Additional Information About Your Visit        [employer] Information     [employer] gives you secure access to your electronic health record. If you see a primary care provider, you can also send messages to your care team and make appointments. If you have questions, please call your primary care clinic.  If you do not have a primary care provider, please call [phone] and they will assist you.        Care EveryWhere ID     This is your Care EveryWhere ID. This could be used by other organizations to access your [facility] medical records  [id number]        Your Vitals Were     Pulse Temperature Respirations Height Pulse Oximetry BMI (Body Mass Index)    67 98.3  F (36.8  C) (Oral) 12 1.6 m (5' 3\") 97% 43.81 kg/m2       Blood Pressure from Last 3 Encounters:   03/13/18 124/85   12/04/17 126/84   01/26/17 122/78    Weight from Last 3 Encounters:   03/13/18 112.2 kg (247 lb 4.8 oz)   12/04/17 111.5 kg (245 lb 12.8 oz)   01/26/17 110.2 kg (243 lb)              Today, you had the following     No orders found for display       Primary Care Provider Office Phone # Fax #    [facility] [phone]       No address on file        Equal Access to Services     [facility] : Hadii aad ku hadasho Somarioali, waaxda luqadaha, qaybta kaalmada buck gay. So [facility] [phone].    ATENCIÓN: Si habla español, tiene a nelson disposición servicios gratuitos de asistencia lingüística. Llame al [phone].    We comply with applicable federal civil rights laws and [place] laws. We do not " "discriminate on the basis of race, color, national origin, age, disability, sex, sexual orientation, or gender identity.            Thank you!     Thank you for choosing [facility]  for your care. Our goal is always to provide you with excellent care. Hearing back from our patients is one way we can continue to improve our services. Please take a few minutes to complete the written survey that you may receive in the mail after your visit with us. Thank you!             Your Updated Medication List - Protect others around you: Learn how to safely use, store and throw away your medicines at www.disposemymeds.org.          This list is accurate as of 3/13/18 11:59 PM.  Always use your most recent med list.                   Brand Name Dispense Instructions for use Diagnosis    levonorgestrel 20 MCG/24HR IUD    MIRENA     1 each by Intrauterine route once        pilocarpine 5 MG tablet    SALAGEN    90 tablet    Pilocarpine 5mg daily x7days, then 5mg BID x7days, then 5mg TID thereafter.  Increase for symptoms but limited by side effects.    Sjogren's syndrome with keratoconjunctivitis sicca (H)       propylene glycol 0.6 % Soln ophthalmic solution    SYSTANE BALANCE    1 Bottle    Place 1 drop into both eyes 2 times daily \"Systane Balance\"    Dry eyes, bilateral       SLEEP AID PO       JAN positive       XARELTO 20 MG Tabs tablet   Generic drug:  rivaroxaban ANTICOAGULANT     90 tablet    TAKE 1 TABLET(20 MG) BY MOUTH DAILY WITH DINNER    Pulmonary embolism (H)         "

## 2018-03-14 NOTE — PROGRESS NOTES
[facility]  [address] [place] [address]  Main: [phone], Fax: [phone]    Patient seen at: [facility] [address] [place] [address] Clinic    Outpatient Visit Note:    Patient: Jesica Mooney  MRN: [id number]  : 1975  OWEN: March 15, 2018    Reason:  History of pulmonary embolism. History of antithrombin deficiency. On chronic anticoagulation therapy. Here for her overdue follow up clinic visit.    HPI:  This is a 42-year-old female without any significant past medical history who in Dec 2015 presented to an outside hospital with pleuritic chest pain and was found to have a left lower lobe pulmonary embolism.  At that time, she was using estrogen-containing oral contraceptive pills.  Otherwise, prior to that episode she was feeling fine without any recent illnesses or long travel or any other provoking factors.  A CTA showed acute pulmonary emboli in the lower lobe and segmental and subsegmental pulmonary arteries.  She was started on Xarelto and oral contraceptive pills were discontinued.  At that time there was no hypercoagulable workup performed, but later on she saw Dr. Leon at [facility] who did a hypercoagulable workup which showed a very high-titer positive JAN, persistently elevated D-dimer around 2.5, and persistently slightly low levels of antithrombin III in the 70s.  Her lupus inhibitor, cardiolipin antibodies, beta-2 glycoprotein antibodies, protein C and protein S levels, and factor II and factor V Leiden mutations were negative.  She was subsequently referred to Rheumatology for high positive JAN titer who diagnosed her with Sjogren syndrome.  She continues to take Xarelto without any problems.  She had an ultrasound of the lower extremities, not at the time of presentation for PE, but in May 2016 which was unremarkable and did not show any DVT.      The patient underwent initial  consultation with Dr. Ata Rendon back in June 14th 2016. At the time, Dr. Rendon recommended that she should maintain on anticoagulation while undergo gene sequencing for antithrombin deficiency. 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 However, Dr. Rendon felt that this likely is a pathologic gene mutation as this mutation has been identified in some individuals with a history of clotting. Thus it was recommended that Jesica is to remain on indefinite anticoagulation therapy.    Interim History:  Jesica reports that in the past 2 years, she has remained on Xarelto at 20 mg PO Qday dosing and has been tolerating it well. She rarely miss any doses. She denies any significant bleeding complications while on the medications. In fact, she reports that she does not even felt like she is taking the medication. She has an IUD placed shortly after her pulmonary embolic event back in 2015 without any current issues of heavy menstrual bleeding. She also denies any issues with frequent epistaxis, no gum bleeding. Denies any hematuria or blood in stools. Denies any frequent bruising.     Medications:  Current Outpatient Prescriptions   Medication     XARELTO 20 MG TABS tablet     propylene glycol (SYSTANE BALANCE) 0.6 % SOLN ophthalmic solution     Doxylamine Succinate, Sleep, (SLEEP AID PO)     levonorgestrel (MIRENA) 20 MCG/24HR IUD     pilocarpine (SALAGEN) 5 MG tablet     No current facility-administered medications for this visit.      Allergies:  Allergies   Allergen Reactions     Keflex [Cephalexin Hcl] Hives     Penicillins Hives     PmHx:  Past Medical History:   Diagnosis Date     IUD (intrauterine device) in place     Mirena inserted 12/28/15     NO ACTIVE PROBLEMS      Social History and Family History:  Deferred.    ROS:  As above. Denies any shortness of breath. No chest pain.    Objective:  Pleasant 42 year old female in no acute  "distress.  Vitals: /85 (BP Location: Right arm, Patient Position: Sitting, Cuff Size: Adult Regular)  Pulse 67  Temp 98.3  F (36.8  C) (Oral)  Resp 12  Ht 1.6 m (5' 3\")  Wt 112.2 kg (247 lb 4.8 oz)  SpO2 97%  BMI 43.81 kg/m2  Exam:  Complete exam is not performed today.    Labs:  Component      Latest Ref Rng & Units 11/27/2017   Sodium      133 - 144 mmol/L 137   Potassium      3.4 - 5.3 mmol/L 4.0   Chloride      94 - 109 mmol/L 105   Carbon Dioxide      20 - 32 mmol/L 29   Anion Gap      3 - 14 mmol/L 3   Glucose      70 - 99 mg/dL 88   Urea Nitrogen      7 - 30 mg/dL 14   Creatinine      0.52 - 1.04 mg/dL 0.90   GFR Estimate      >60 mL/min/1.7m2 68   GFR Estimate If Black      >60 mL/min/1.7m2 82   Calcium      8.5 - 10.1 mg/dL 9.2   Bilirubin Total      0.2 - 1.3 mg/dL 0.4   Albumin      3.4 - 5.0 g/dL 3.8   Protein Total      6.8 - 8.8 g/dL 8.5   Alkaline Phosphatase      40 - 150 U/L 65   ALT      0 - 50 U/L 42   AST      0 - 45 U/L 26     Assessment:  In summary, Jesica is a 42 year old female with a history of pulmonary embolism back in Dec 2015 that was associated with long term (20 years) estrogen use without any other triggers, and subsequently found to have persistently mildly decreased antithrombin levels as well as 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 In general, Jesica is doing very well with Xarelto without any issues with bleeding complications or any recurrent venous thromboembolic events. She is very content with this regimen.     Diagnosis:  1. History of pulmonary embolism back in Dec 2015.   2. Antithrombin deficiency.  3. 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     Plan:  No change in regard to her current anticoagulation therapy with Xarelto at 20 mg PO Qday dosing.     I discussed with her about the recent FDA " "approval of a lesser dosing regimen (Xarelto 10 mg) as secondary pharmacological DVT/PE prophylaxis. I explain to her that given that she does have antithrombin deficiency and that she is doing very well on the 20 mg dosing regimen without any bleeding complications, I feel that she should stay on the 20 mg dosing regimen at this time. Jesica is in agreement with this recommendation.     I have asked the patient to contact our center immediately if she should notice any unusual bleeding issues. I also instructed her to call our center if she should need any invasive or surgical procedures. At which time, our center can provide anticoagulation management recommendation surrounding these procedures.    The patient inquires about \"what if\" she becomes pregnant and how would anticoagulation therapy be like if she should becomes pregnant? She does not have any plans to becomes pregnant currently. I ask her to contact our center immediately if she should find out that she is pregnant as Xarelto should not be used in women who are pregnant. I explain to her that if she should find out that she is pregnant, we will have her start Enoxaparin 1 mg/kg SubQ Q 12 hours injections and have her returns to clinic to see us to further discuss management. Note, patient currently has an IUD in place.    We will plan on seeing her back annually.     Total Time Spent:  26 minutes, all 26 minutes was spent on face-to-face consultation with the patient and coordination of care in regard to her history of pulmonary embolism and antithrombin deficiency as well as anticoagulation therapy management.    Time IN: 11:37  Time OUT: 12:03      Vijay Jerome PA-C, MPAS  Physician Assistant  [facility].               "

## 2018-05-21 DIAGNOSIS — I26.99 PULMONARY EMBOLISM (H): ICD-10-CM

## 2018-05-22 RX ORDER — RIVAROXABAN 20 MG/1
TABLET, FILM COATED ORAL
Qty: 90 TABLET | Refills: 3 | Status: SHIPPED | OUTPATIENT
Start: 2018-05-22 | End: 2020-03-16

## 2018-08-02 ENCOUNTER — RADIANT APPOINTMENT (OUTPATIENT)
Dept: MAMMOGRAPHY | Facility: CLINIC | Age: 43
End: 2018-08-02
Attending: NURSE PRACTITIONER
Payer: COMMERCIAL

## 2018-08-02 DIAGNOSIS — Z12.31 VISIT FOR SCREENING MAMMOGRAM: ICD-10-CM

## 2018-08-02 PROCEDURE — 77067 SCR MAMMO BI INCL CAD: CPT | Mod: TC

## 2018-11-26 ENCOUNTER — OFFICE VISIT (OUTPATIENT)
Dept: OBGYN | Facility: CLINIC | Age: 43
End: 2018-11-26
Payer: COMMERCIAL

## 2018-11-26 VITALS
HEIGHT: 63 IN | SYSTOLIC BLOOD PRESSURE: 124 MMHG | TEMPERATURE: 99.1 F | WEIGHT: 243.2 LBS | HEART RATE: 71 BPM | BODY MASS INDEX: 43.09 KG/M2 | DIASTOLIC BLOOD PRESSURE: 86 MMHG

## 2018-11-26 DIAGNOSIS — Z01.419 ENCOUNTER FOR GYNECOLOGICAL EXAMINATION WITHOUT ABNORMAL FINDING: Primary | ICD-10-CM

## 2018-11-26 DIAGNOSIS — B37.31 YEAST INFECTION OF THE VAGINA: ICD-10-CM

## 2018-11-26 DIAGNOSIS — N89.8 VAGINAL ODOR: ICD-10-CM

## 2018-11-26 DIAGNOSIS — M35.01 SJOGREN'S SYNDROME WITH KERATOCONJUNCTIVITIS SICCA (H): ICD-10-CM

## 2018-11-26 LAB
ALBUMIN SERPL-MCNC: 3.6 G/DL (ref 3.4–5)
ALBUMIN UR-MCNC: NEGATIVE MG/DL
ALP SERPL-CCNC: 63 U/L (ref 40–150)
ALT SERPL W P-5'-P-CCNC: 35 U/L (ref 0–50)
ANION GAP SERPL CALCULATED.3IONS-SCNC: 5 MMOL/L (ref 3–14)
APPEARANCE UR: CLEAR
AST SERPL W P-5'-P-CCNC: 25 U/L (ref 0–45)
BASOPHILS # BLD AUTO: 0 10E9/L (ref 0–0.2)
BASOPHILS NFR BLD AUTO: 0.3 %
BILIRUB SERPL-MCNC: 0.3 MG/DL (ref 0.2–1.3)
BILIRUB UR QL STRIP: NEGATIVE
BUN SERPL-MCNC: 14 MG/DL (ref 7–30)
CALCIUM SERPL-MCNC: 9.3 MG/DL (ref 8.5–10.1)
CHLORIDE SERPL-SCNC: 106 MMOL/L (ref 94–109)
CO2 SERPL-SCNC: 27 MMOL/L (ref 20–32)
COLOR UR AUTO: YELLOW
CREAT SERPL-MCNC: 0.81 MG/DL (ref 0.52–1.04)
CREAT UR-MCNC: 46 MG/DL
DIFFERENTIAL METHOD BLD: NORMAL
EOSINOPHIL # BLD AUTO: 0.4 10E9/L (ref 0–0.7)
EOSINOPHIL NFR BLD AUTO: 6.1 %
ERYTHROCYTE [DISTWIDTH] IN BLOOD BY AUTOMATED COUNT: 12.6 % (ref 10–15)
GFR SERPL CREATININE-BSD FRML MDRD: 77 ML/MIN/1.7M2
GLUCOSE SERPL-MCNC: 96 MG/DL (ref 70–99)
GLUCOSE UR STRIP-MCNC: NEGATIVE MG/DL
HCT VFR BLD AUTO: 44.4 % (ref 35–47)
HGB BLD-MCNC: 15.1 G/DL (ref 11.7–15.7)
HGB UR QL STRIP: ABNORMAL
KETONES UR STRIP-MCNC: NEGATIVE MG/DL
LEUKOCYTE ESTERASE UR QL STRIP: ABNORMAL
LYMPHOCYTES # BLD AUTO: 2.9 10E9/L (ref 0.8–5.3)
LYMPHOCYTES NFR BLD AUTO: 39.3 %
MCH RBC QN AUTO: 30.8 PG (ref 26.5–33)
MCHC RBC AUTO-ENTMCNC: 34 G/DL (ref 31.5–36.5)
MCV RBC AUTO: 90 FL (ref 78–100)
MONOCYTES # BLD AUTO: 0.7 10E9/L (ref 0–1.3)
MONOCYTES NFR BLD AUTO: 9.4 %
NEUTROPHILS # BLD AUTO: 3.3 10E9/L (ref 1.6–8.3)
NEUTROPHILS NFR BLD AUTO: 44.9 %
NITRATE UR QL: NEGATIVE
NON-SQ EPI CELLS #/AREA URNS LPF: ABNORMAL /LPF
PH UR STRIP: 6.5 PH (ref 5–7)
PLATELET # BLD AUTO: 246 10E9/L (ref 150–450)
POTASSIUM SERPL-SCNC: 4.1 MMOL/L (ref 3.4–5.3)
PROT SERPL-MCNC: 8.1 G/DL (ref 6.8–8.8)
PROT UR-MCNC: <0.05 G/L
PROT/CREAT 24H UR: NORMAL G/G CR (ref 0–0.2)
RBC # BLD AUTO: 4.91 10E12/L (ref 3.8–5.2)
RBC #/AREA URNS AUTO: ABNORMAL /HPF
SODIUM SERPL-SCNC: 138 MMOL/L (ref 133–144)
SOURCE: ABNORMAL
SP GR UR STRIP: 1.01 (ref 1–1.03)
SPECIMEN SOURCE: ABNORMAL
UROBILINOGEN UR STRIP-ACNC: 0.2 EU/DL (ref 0.2–1)
WBC # BLD AUTO: 7.3 10E9/L (ref 4–11)
WBC #/AREA URNS AUTO: ABNORMAL /HPF
WET PREP SPEC: ABNORMAL

## 2018-11-26 PROCEDURE — 87624 HPV HI-RISK TYP POOLED RSLT: CPT | Performed by: NURSE PRACTITIONER

## 2018-11-26 PROCEDURE — 99396 PREV VISIT EST AGE 40-64: CPT | Performed by: NURSE PRACTITIONER

## 2018-11-26 PROCEDURE — 80053 COMPREHEN METABOLIC PANEL: CPT | Performed by: INTERNAL MEDICINE

## 2018-11-26 PROCEDURE — 85025 COMPLETE CBC W/AUTO DIFF WBC: CPT | Performed by: INTERNAL MEDICINE

## 2018-11-26 PROCEDURE — 36415 COLL VENOUS BLD VENIPUNCTURE: CPT | Performed by: INTERNAL MEDICINE

## 2018-11-26 PROCEDURE — 84156 ASSAY OF PROTEIN URINE: CPT | Performed by: INTERNAL MEDICINE

## 2018-11-26 PROCEDURE — 87210 SMEAR WET MOUNT SALINE/INK: CPT | Performed by: NURSE PRACTITIONER

## 2018-11-26 PROCEDURE — 81001 URINALYSIS AUTO W/SCOPE: CPT | Performed by: INTERNAL MEDICINE

## 2018-11-26 PROCEDURE — G0145 SCR C/V CYTO,THINLAYER,RESCR: HCPCS | Performed by: NURSE PRACTITIONER

## 2018-11-26 RX ORDER — FLUCONAZOLE 150 MG/1
150 TABLET ORAL ONCE
Qty: 1 TABLET | Refills: 0 | Status: SHIPPED | OUTPATIENT
Start: 2018-11-26 | End: 2019-03-12

## 2018-11-26 NOTE — MR AVS SNAPSHOT
After Visit Summary   11/26/2018    Jesica Mooney    MRN: [id number]           Patient Information     Date Of Birth          1975        Visit Information        Provider Department      11/26/2018 12:10 PM Brenda Graves APRN [facility]        Today's Diagnoses     Encounter for gynecological examination without abnormal finding    -  1    Vaginal odor        Yeast infection of the vagina          Care Instructions      Preventive Health Recommendations  Female Ages 40 to 49    Yearly exam:     See your health care provider every year in order to  1. Review health changes.   2. Discuss preventive care.    3. Review your medicines if your doctor prescribed any.      Get a Pap test every three years (unless you have an abnormal result and your provider advises testing more often).      If you get Pap tests with HPV test, you only need to test every 5 years, unless you have an abnormal result. You do not need a Pap test if your uterus was removed (hysterectomy) and you have not had cancer.      You should be tested each year for STDs (sexually transmitted diseases), if you're at risk.     Ask your doctor if you should have a mammogram.      Have a colonoscopy (test for colon cancer) if someone in your family has had colon cancer or polyps before age 50.       Have a cholesterol test every 5 years.       Have a diabetes test (fasting glucose) after age 45. If you are at risk for diabetes, you should have this test every 3 years.    Shots: Get a flu shot each year. Get a tetanus shot every 10 years.     Nutrition:     Eat at least 5 servings of fruits and vegetables each day.    Eat whole-grain bread, whole-wheat pasta and brown rice instead of white grains and rice.    Get adequate Calcium and Vitamin D.      Lifestyle    Exercise at least 150 minutes a week (an average of 30 minutes a day, 5 days a week). This will help you control your weight and prevent  disease.    Limit alcohol to one drink per day.    No smoking.     Wear sunscreen to prevent skin cancer.    See your dentist every six months for an exam and cleaning.          Follow-ups after your visit        Your next 10 appointments already scheduled     Dec 03, 2018 11:40 AM CST   Return Visit with Dylan Donato MD   [facility] [place] ([facility] [place])    [address] [facility]  [place] [id number] [phone]            Mar 12, 2019 11:30 AM CDT   RETURN CLOTTING DISORDER with Vijay Jerome PA-C   [facility] ([facility])    [address]  [address]  [facility] [address] [phone]              Who to contact     If you have questions or need follow up information about today's clinic visit or your schedule please contact [facility] RAPHAEL directly at [phone].  Normal or non-critical lab and imaging results will be communicated to you by MyChart, letter or phone within 4 business days after the clinic has received the results. If you do not hear from us within 7 days, please contact the clinic through Modalityhart or phone. If you have a critical or abnormal lab result, we will notify you by phone as soon as possible.  Submit refill requests through Wingz or call your pharmacy and they will forward the refill request to us. Please allow 3 business days for your refill to be completed.          Additional Information About Your Visit        [employer] Information     Wingz gives you secure access to your electronic health record. If you see a primary care provider, you can also send messages to your care team and make appointments. If you have questions, please call your primary care clinic.  If you do not have a primary care provider, please call [phone] and they will assist you.        Care EveryWhere ID     This is your Care EveryWhere ID. This could be used by other organizations to  "access your [facility] medical records  [id number]        Your Vitals Were     Pulse Temperature Height Breastfeeding? BMI (Body Mass Index)       71 99.1  F (37.3  C) (Tympanic) 5' 2.75\" (1.594 m) No 43.42 kg/m2        Blood Pressure from Last 3 Encounters:   11/26/18 124/86   03/13/18 124/85   12/04/17 126/84    Weight from Last 3 Encounters:   11/26/18 243 lb 3.2 oz (110.3 kg)   03/13/18 247 lb 4.8 oz (112.2 kg)   12/04/17 245 lb 12.8 oz (111.5 kg)              We Performed the Following     HPV High Risk Types DNA Cervical     Pap imaged thin layer screen with HPV - recommended age 30 - 65 years (select HPV order below)     Wet prep          Today's Medication Changes          These changes are accurate as of 11/26/18  1:36 PM.  If you have any questions, ask your nurse or doctor.               Start taking these medicines.        Dose/Directions    fluconazole 150 MG tablet   Commonly known as:  DIFLUCAN   Used for:  Yeast infection of the vagina   Started by:  Brenda Graves APRN CNP        Dose:  150 mg   Take 1 tablet (150 mg) by mouth once for 1 dose   Quantity:  1 tablet   Refills:  0            Where to get your medicines      These medications were sent to [facility] [address], [facility] [address]  [address] [place] [address]     Phone:  [phone]     fluconazole 150 MG tablet                Primary Care Provider Office Phone # Fax #    [facility] [phone]       No address on file        Equal Access to Services     [facility] JESSICA AH: Hadii aad ku hadasho Soomaali, waaxda luqadaha, qaybta kaalmada adeegyatanya, buck mix. So [facility] [phone].    ATENCIÓN: Si habla español, tiene a nelson disposición servicios gratuitos de asistencia lingüística. Llame al [phone].    We comply with applicable federal civil rights laws and [place] laws. We do not discriminate on the basis of race, color, national " "origin, age, disability, sex, sexual orientation, or gender identity.            Thank you!     [facility] you for [facility]  [facility]. Our goal is always to provide you with excellent care. Hearing back from our patients is one way we can continue to improve our services. Please take a few minutes to complete the written survey that you may receive in the mail after your visit with us. Thank you!             Your Updated Medication List - Protect others around you: Learn how to safely use, store and throw away your medicines at www.disposemymeds.org.          This list is accurate as of 11/26/18  1:36 PM.  Always use your most recent med list.                   Brand Name Dispense Instructions for use Diagnosis    fluconazole 150 MG tablet    DIFLUCAN    1 tablet    Take 1 tablet (150 mg) by mouth once for 1 dose    Yeast infection of the vagina       levonorgestrel 20 MCG/24HR IUD    MIRENA     1 each by Intrauterine route once        pilocarpine 5 MG tablet    SALAGEN    90 tablet    Pilocarpine 5mg daily x7days, then 5mg BID x7days, then 5mg TID thereafter.  Increase for symptoms but limited by side effects.    Sjogren's syndrome with keratoconjunctivitis sicca (H)       propylene glycol 0.6 % Soln ophthalmic solution    SYSTANE BALANCE    1 Bottle    Place 1 drop into both eyes 2 times daily \"Systane Balance\"    Dry eyes, bilateral       SLEEP AID PO       JAN positive       XARELTO 20 MG Tabs tablet   Generic drug:  rivaroxaban ANTICOAGULANT     90 tablet    TAKE 1 TABLET(20 MG) BY MOUTH DAILY WITH DINNER    Pulmonary embolism (H)         "

## 2018-11-26 NOTE — PROGRESS NOTES
SUBJECTIVE:   CC: Jesica Mooney is an 43 year old woman who presents for preventive health visit.     Annual Exam:  Frequency of exercise:: 1 day/week  Getting at least 3 servings of Calcium per day:: Yes  Diet:: Regular (no restrictions)  Taking medications regularly:: Yes  Medication side effects:: None  Bi-annual eye exam:: NO  Dental care twice a year:: Yes  Sleep apnea or symptoms of sleep apnea:: None  Additional concerns today:: No  PHQ-2 Score: 0  Duration of exercise:: 15-30 minutes    Has had a vaginal odor for the last 1 month. Initially started with vulvar itching, but that has resolved. Odor is mild. Denies itching, discharge changes, urinary symptoms, pelvic pain, fevers, STI concerns.    Today's PHQ-2 Score:   PHQ-2 ( 1999 Pf[employer]) 11/26/2018 11/21/2018   Q1: Little interest or pleasure in doing things 0 0   Q2: Feeling down, depressed or hopeless 0 0   PHQ-2 Score 0 0   Q1: Little interest or pleasure in doing things - Not at all   Q2: Feeling down, depressed or hopeless - Not at all   PHQ-2 Score - 0       Abuse: Current or Past(Physical, Sexual or Emotional)- No  Do you feel safe in your environment - Yes    Social History   Substance Use Topics     Smoking status: Never Smoker     Smokeless tobacco: Never Used      Comment: Lives in smoke free household     Alcohol use No     If you drink alcohol do you typically have >3 drinks per day or >7 drinks per week? No                     Reviewed orders with patient.  Reviewed health maintenance and updated orders accordingly - Yes  Patient Active Problem List   Diagnosis     BMI 40.0-44.9, adult (H)     CARDIOVASCULAR SCREENING; LDL GOAL LESS THAN 160     Other acute pulmonary embolism     Sjogren's syndrome (H)     Past Surgical History:   Procedure Laterality Date     C ORAL SURGERY PROCEDURE  10/1990    wisdom teeth extracted       Social History   Substance Use Topics     Smoking status: Never Smoker     Smokeless tobacco: Never Used       Comment: Lives in smoke free household     Alcohol use No     Family History   Problem Relation Age of Onset     Eye Disorder Mother      cataracts      Rheumatoid Arthritis Mother      Cancer - colorectal Paternal Grandmother      ?     Thyroid Disease Father      Breast Cancer Maternal Aunt      diagnosed in 30s (mother's 1/2 sister)     Cancer No family hx of      Diabetes No family hx of      Hypertension No family hx of      Cerebrovascular Disease No family hx of      Macular Degeneration No family hx of      Glaucoma No family hx of            Mammogram Screening: Patient under age 50, mutual decision reflected in health maintenance.      Pertinent mammograms are reviewed under the imaging tab.  History of abnormal Pap smear: NO - age 30-65 PAP every 5 years with negative HPV co-testing recommended  PAP / HPV Latest Ref Rng & Units 10/5/2015 4/23/2012   PAP - NIL NIL   HPV 16 DNA NEG Negative -   HPV 18 DNA NEG Negative -   OTHER HR HPV NEG Negative -     Reviewed and updated as needed this visit by clinical staff  Tobacco  Allergies  Meds  Med Hx  Surg Hx  Fam Hx  Soc Hx        Reviewed and updated as needed this visit by Provider        Past Medical History:   Diagnosis Date     IUD (intrauterine device) in place     Mirena inserted 12/28/15     NO ACTIVE PROBLEMS       Past Surgical History:   Procedure Laterality Date     C ORAL SURGERY PROCEDURE  10/1990    wisdom teeth extracted       ROS:  CONSTITUTIONAL: NEGATIVE for fever, chills, change in weight  INTEGUMENTARU/SKIN: NEGATIVE for worrisome rashes, moles or lesions  EYES: NEGATIVE for vision changes or irritation  ENT: NEGATIVE for ear, mouth and throat problems  RESP: NEGATIVE for significant cough or SOB  BREAST: NEGATIVE for masses, tenderness or discharge  CV: NEGATIVE for chest pain, palpitations or peripheral edema  GI: NEGATIVE for nausea, abdominal pain, heartburn, or change in bowel habits  : NEGATIVE for unusual urinary or vaginal  "symptoms. Periods are suppressed with Mirena.  MUSCULOSKELETAL: NEGATIVE for significant arthralgias or myalgia  NEURO: NEGATIVE for weakness, dizziness or paresthesias  PSYCHIATRIC: NEGATIVE for changes in mood or affect    OBJECTIVE:   /86  Pulse 71  Temp 99.1  F (37.3  C) (Tympanic)  Ht 5' 2.75\" (1.594 m)  Wt 243 lb 3.2 oz (110.3 kg)  Breastfeeding? No  BMI 43.42 kg/m2  EXAM:  GENERAL: healthy, alert and no distress  EYES: Eyes grossly normal to inspection, PERRL and conjunctivae and sclerae normal  HENT: ear canals and TM's normal, nose and mouth without ulcers or lesions  NECK: no adenopathy, no asymmetry, masses, or scars and thyroid normal to palpation  RESP: lungs clear to auscultation - no rales, rhonchi or wheezes  BREAST: normal without masses, tenderness or nipple discharge and no palpable axillary masses or adenopathy  CV: regular rate and rhythm, normal S1 S2, no S3 or S4, no murmur, click or rub, no peripheral edema and peripheral pulses strong  ABDOMEN: soft, nontender, no hepatosplenomegaly, no masses and bowel sounds normal   (female): normal female external genitalia, normal urethral meatus, vaginal mucosa pink, moist, well rugated, and normal cervix/adnexa/uterus without masses or discharge. IUD strings visible  MS: no gross musculoskeletal defects noted, no edema  SKIN: no suspicious lesions or rashes  NEURO: Normal strength and tone, mentation intact and speech normal  PSYCH: mentation appears normal, affect normal/bright    Diagnostic Test Results:  Results for orders placed or performed in visit on 11/26/18 (from the past 24 hour(s))   Wet prep   Result Value Ref Range    Specimen Description Vagina     Wet Prep No Trichomonas seen     Wet Prep No clue cells seen     Wet Prep Yeast seen (A)        ASSESSMENT/PLAN:   1. Encounter for gynecological examination without abnormal finding  IUD appears to be in appropriate location  - Pap imaged thin layer screen with HPV - recommended " "age 30 - 65 years (select HPV order below)  - HPV High Risk Types DNA Cervical    2. Vaginal odor  - Wet prep    3. Yeast infection of the vagina  - fluconazole (DIFLUCAN) 150 MG tablet; Take 1 tablet (150 mg) by mouth once for 1 dose  Dispense: 1 tablet; Refill: 0    COUNSELING:   Reviewed preventive health counseling, as reflected in patient instructions  Special attention given to:        Regular exercise       Healthy diet/nutrition       Contraception    BP Readings from Last 1 Encounters:   11/26/18 124/86     Estimated body mass index is 43.42 kg/(m^2) as calculated from the following:    Height as of this encounter: 5' 2.75\" (1.594 m).    Weight as of this encounter: 243 lb 3.2 oz (110.3 kg).           reports that she has never smoked. She has never used smokeless tobacco.      Counseling Resources:  ATP IV Guidelines  Pooled Cohorts Equation Calculator  Breast Cancer Risk Calculator  FRAX Risk Assessment  ICSI Preventive Guidelines  Dietary Guidelines for Americans, 2010  USDA's MyPlate  ASA Prophylaxis  Lung CA Screening    JUDSON Pizarro CNP  [facility]  "

## 2018-11-27 NOTE — PROGRESS NOTES
"  SUBJECTIVE:                                                    Jesica Mooney is a 43 year old female who presents to clinic today for the following health issues:    Check skin tags. 1 on the inner R thigh and a few small ones on the neck that pt would like to have looked.  She has had skin tags frozen off before and would like it again if possible.     They catch on her clothing and can cause pain.     Problem list and histories reviewed & adjusted, as indicated.  Additional history: as documented    Patient Active Problem List   Diagnosis     BMI 40.0-44.9, adult (H)     CARDIOVASCULAR SCREENING; LDL GOAL LESS THAN 160     Other acute pulmonary embolism     Sjogren's syndrome (H)     Past Surgical History:   Procedure Laterality Date     BIOPSY  a fews years ago    A mole was removed from by scalp     C ORAL SURGERY PROCEDURE  10/1990    wisdom teeth extracted       Social History   Substance Use Topics     Smoking status: Never Smoker     Smokeless tobacco: Never Used      Comment: Lives in smoke free household     Alcohol use No     Family History   Problem Relation Age of Onset     Eye Disorder Mother      cataracts      Rheumatoid Arthritis Mother      Cancer - colorectal Paternal Grandmother      ?     Thyroid Disease Father      Breast Cancer Maternal Aunt      diagnosed in 30s (mother's 1/2 sister)     Cancer No family hx of      Diabetes No family hx of      Hypertension No family hx of      Cerebrovascular Disease No family hx of      Macular Degeneration No family hx of      Glaucoma No family hx of          Current Outpatient Prescriptions   Medication Sig Dispense Refill     Doxylamine Succinate, Sleep, (SLEEP AID PO)        levonorgestrel (MIRENA) 20 MCG/24HR IUD 1 each by Intrauterine route once       propylene glycol (SYSTANE BALANCE) 0.6 % SOLN ophthalmic solution Place 1 drop into both eyes 2 times daily \"Systane Balance\" 1 Bottle      XARELTO 20 MG TABS tablet TAKE 1 TABLET(20 MG) BY " MOUTH DAILY WITH DINNER 90 tablet 3     Allergies   Allergen Reactions     Keflex [Cephalexin Hcl] Hives     Penicillins Hives       ROS:  Constitutional, HEENT, cardiovascular, pulmonary, gi and gu systems are negative, except as otherwise noted.    OBJECTIVE:     /78  Pulse 72  Temp 99.8  F (37.7  C) (Oral)  Resp 18  Wt 242 lb (109.8 kg)  SpO2 96%  Breastfeeding? No  BMI 43.21 kg/m2  Body mass index is 43.21 kg/(m^2).  SKIN:   Location: 4 small skin colored pedunculated lesions on right side of neck, 5 on left side, and one larger one on right thigh. No concerning lesions seen. They all look consistent with skin tags.     Procedure: lesions were treated with liquid nitrogen cryotherapy x 2.  Patient tolerated well with minimal discomfort.  Discusses signs and symptoms to monitor for including redness, pain, or other signs of infection.      ASSESSMENT/PLAN:     ASSESSMENT / PLAN:  (L91.8) Skin tag  (primary encounter diagnosis)  Comment:   Plan: C DESTROY < 15 BENIGN SKIN LESIONS            (Z23) Need for Tdap vaccination  Comment:   Plan: TDAP VACCINE (ADACEL), ADMIN 1st VACCINE                Riddhi Almaguer PA-C  [facility]

## 2018-11-28 LAB
COPATH REPORT: NORMAL
PAP: NORMAL

## 2018-11-29 LAB
FINAL DIAGNOSIS: NORMAL
HPV HR 12 DNA CVX QL NAA+PROBE: NEGATIVE
HPV16 DNA SPEC QL NAA+PROBE: NEGATIVE
HPV18 DNA SPEC QL NAA+PROBE: NEGATIVE
SPECIMEN DESCRIPTION: NORMAL
SPECIMEN SOURCE CVX/VAG CYTO: NORMAL

## 2018-12-03 ENCOUNTER — OFFICE VISIT (OUTPATIENT)
Dept: RHEUMATOLOGY | Facility: CLINIC | Age: 43
End: 2018-12-03
Payer: COMMERCIAL

## 2018-12-03 ENCOUNTER — OFFICE VISIT (OUTPATIENT)
Dept: FAMILY MEDICINE | Facility: CLINIC | Age: 43
End: 2018-12-03
Payer: COMMERCIAL

## 2018-12-03 VITALS
SYSTOLIC BLOOD PRESSURE: 118 MMHG | WEIGHT: 242 LBS | OXYGEN SATURATION: 96 % | HEART RATE: 72 BPM | TEMPERATURE: 99.8 F | DIASTOLIC BLOOD PRESSURE: 78 MMHG | RESPIRATION RATE: 18 BRPM | BODY MASS INDEX: 43.21 KG/M2

## 2018-12-03 VITALS
TEMPERATURE: 99.8 F | DIASTOLIC BLOOD PRESSURE: 78 MMHG | HEART RATE: 72 BPM | BODY MASS INDEX: 43.21 KG/M2 | SYSTOLIC BLOOD PRESSURE: 118 MMHG | WEIGHT: 242 LBS

## 2018-12-03 DIAGNOSIS — M35.01 SJOGREN'S SYNDROME WITH KERATOCONJUNCTIVITIS SICCA (H): Primary | ICD-10-CM

## 2018-12-03 DIAGNOSIS — Z23 NEED FOR TDAP VACCINATION: ICD-10-CM

## 2018-12-03 DIAGNOSIS — L91.8 SKIN TAG: Primary | ICD-10-CM

## 2018-12-03 PROCEDURE — 90715 TDAP VACCINE 7 YRS/> IM: CPT | Performed by: PHYSICIAN ASSISTANT

## 2018-12-03 PROCEDURE — 90471 IMMUNIZATION ADMIN: CPT | Mod: 59 | Performed by: PHYSICIAN ASSISTANT

## 2018-12-03 PROCEDURE — 99213 OFFICE O/P EST LOW 20 MIN: CPT | Performed by: INTERNAL MEDICINE

## 2018-12-03 PROCEDURE — 11200 RMVL SKIN TAGS UP TO&INC 15: CPT | Performed by: PHYSICIAN ASSISTANT

## 2018-12-03 RX ORDER — PILOCARPINE HYDROCHLORIDE 5 MG/1
TABLET, FILM COATED ORAL
Qty: 90 TABLET | Refills: 6 | Status: SHIPPED | OUTPATIENT
Start: 2018-12-03 | End: 2019-09-16

## 2018-12-03 ASSESSMENT — PAIN SCALES - GENERAL: PAINLEVEL: NO PAIN (0)

## 2018-12-03 NOTE — PATIENT INSTRUCTIONS
[id number] in 2 weeks, then at least 8 weeks later: [id number]    In 2 weeks: Shingrix (shingles vaccine)

## 2018-12-03 NOTE — MR AVS SNAPSHOT
After Visit Summary   12/3/2018    Jesica Mooney    MRN: [id number]           Patient Information     Date Of Birth          1975        Visit Information        Provider Department      12/3/2018 11:40 AM Dylan Donato MD [facility]        Today's Diagnoses     Sjogren's syndrome with keratoconjunctivitis sicca (H)    -  1      Care Instructions    [id number] in 2 weeks, then at least 8 weeks later: [id number]    In 2 weeks: Shingrix (shingles vaccine)          Follow-ups after your visit        Your next 10 appointments already scheduled     Mar 12, 2019 11:30 AM CDT   RETURN CLOTTING DISORDER with Vijay Jerome PA-C   [facility] ([facility])    [address] [facility]  [address] [place] [id number] [phone]            Nov 25, 2019  8:30 AM CST   LAB with AN LAB   [facility]    [address] [facility] [address] [phone]           Please do not eat 10-12 hours before your appointment if you are coming in fasting for labs on lipids, cholesterol, or glucose (sugar). This does not apply to pregnant women. Water, hot tea and black coffee (with nothing added) are okay. Do not drink other fluids, diet soda or chew gum.            Dec 02, 2019 11:40 AM CST   Return Visit with Dylan Donato MD   [facility] ([facility])    [address] [facility] [address] [phone]              Who to contact     If you have questions or need follow up information about today's clinic visit or your schedule please contact [facility] at [phone].  Normal or non-critical lab and imaging results will be communicated to you by MyChart, letter or phone within 4 business days after the clinic has received the results. If you do not hear from us within 7 days, please contact the clinic through  Ailolat or phone. If you have a critical or abnormal lab result, we will notify you by phone as soon as possible.  Submit refill requests through Qwikwire or call your pharmacy and they will forward the refill request to us. Please allow 3 business days for your refill to be completed.          Additional Information About Your Visit        [employer] Information     [employer] gives you secure access to your electronic health record. If you see a primary care provider, you can also send messages to your care team and make appointments. If you have questions, please call your primary care clinic.  If you do not have a primary care provider, please call [phone] and they will assist you.        Care EveryWhere ID     This is your Care EveryWhere ID. This could be used by other organizations to access your [facility] medical records  [id number]        Your Vitals Were     Pulse Temperature BMI (Body Mass Index)             72 99.8  F (37.7  C) (Oral) 43.21 kg/m2          Blood Pressure from Last 3 Encounters:   12/03/18 118/78   12/03/18 118/78   11/26/18 124/86    Weight from Last 3 Encounters:   12/03/18 109.8 kg (242 lb)   12/03/18 109.8 kg (242 lb)   11/26/18 110.3 kg (243 lb 3.2 oz)              Today, you had the following     No orders found for display         Where to get your medicines      These medications were sent to [facility] [address] [facility], [facility] [address]  [address] [address] [address]     Phone:  [phone]     pilocarpine 5 MG tablet          Primary Care Provider Office Phone # Fax #    [facility] [phone]       No address on file        Equal Access to Services     [facility] : Hadii ada tipton Sodionna, waaxda luqadaha, qaybta kaalmada adehumzayada, buck mix. So [facility] [phone].    ATENCIÓN: Si habla español, tiene a nelson disposición servicios gratuitos de asistencia lingüística. Llame al  "[phone].    We comply with applicable federal civil rights laws and [place] laws. We do not discriminate on the basis of race, color, national origin, age, disability, sex, sexual orientation, or gender identity.            Thank you!     Thank you for choosing [facility]  for your care. Our goal is always to provide you with excellent care. Hearing back from our patients is one way we can continue to improve our services. Please take a few minutes to complete the written survey that you may receive in the mail after your visit with us. Thank you!             Your Updated Medication List - Protect others around you: Learn how to safely use, store and throw away your medicines at www.disposemymeds.org.          This list is accurate as of 12/3/18 12:24 PM.  Always use your most recent med list.                   Brand Name Dispense Instructions for use Diagnosis    levonorgestrel 20 MCG/24HR IUD    MIRENA     1 each by Intrauterine route once        pilocarpine 5 MG tablet    SALAGEN    90 tablet    Pilocarpine 5mg daily x7days, then 5mg BID x7days, then 5mg TID thereafter.  Increase for symptoms but limited by side effects.    Sjogren's syndrome with keratoconjunctivitis sicca (H)       propylene glycol 0.6 % Soln ophthalmic solution    SYSTANE BALANCE    1 Bottle    Place 1 drop into both eyes 2 times daily \"Systane Balance\"    Dry eyes, bilateral       SLEEP AID PO       JAN positive       XARELTO 20 MG Tabs tablet   Generic drug:  rivaroxaban ANTICOAGULANT     90 tablet    TAKE 1 TABLET(20 MG) BY MOUTH DAILY WITH DINNER    Pulmonary embolism (H)         "

## 2018-12-03 NOTE — NURSING NOTE
"Chief Complaint   Patient presents with     Skin Tags     Check Skin tags, 1 on the inner R thigh, a few small one on the neck area per pt.     Health Maintenance     orders pended       Initial /78  Pulse 72  Temp 99.8  F (37.7  C) (Oral)  Resp 18  Wt 242 lb (109.8 kg)  SpO2 96%  Breastfeeding? No  BMI 43.21 kg/m2 Estimated body mass index is 43.21 kg/(m^2) as calculated from the following:    Height as of 11/26/18: 5' 2.75\" (1.594 m).    Weight as of this encounter: 242 lb (109.8 kg).  Medication Reconciliation: complete      Emmanuel Benitez MA    "

## 2018-12-03 NOTE — PROGRESS NOTES
Rheumatology Clinic Visit      Jesica Mooney MRN# [id number]   YOB: 1975 Age: 43 year old      Date of visit: 12/03/18   Hematology/Oncology: Dr. Flavia Leon and Dr. Ata Rendon  PCP: Brenda Graves    Chief Complaint   Patient presents with:  RECHECK: Sjogren's 1 year follow up      Assessment and Plan     1. Sjogren's Syndrome (JAN >1:22161, SSA >8):  She has punctal plugs currently; restasis was reportedly ineffective when she used a sample from her ophthalmologist.  She will continue following with her ophthalmologist.  We discussed pilocarpine in the past and at the last clinic visit she wanted to try it but the prescription was sent to a pharmacy that she only uses for Xarelto so she did not know that it was there and when she asked for a water pill since her last clinic visit I thought she was referring to a diuretic.  Overall doing okay but she would like to try pilocarpine at this time.  We again reviewed the risks and side effects of pilocarpine.    - Start pilocarpine 5mg daily x7days, then 5mg BID x7days, then 5mg TID thereafter.  Increase for management of symptoms but limited by side effects.    - Labs 1 week prior to f/u: CBC, CMP, UA, Uprotein:creatinine    2. Pulmonary Embolism: Diagnosed 12/2015 and is on anticoagulation.  Followed by Dr. Leon (heme/onc) and Dr. Rendon (heme/onc)    3.  Vaccinations: Vaccinations reviewed with Ms. Mooney.  Risks and benefits of vaccinations were discussed. Data and lack of data for shingrix reviewed.  CDC stance on shingrix when on moderate to high immunosuppression reviewed.  I explained that Shingrix is used off label when under 50 years old and that the safety and efficacy of the vaccine has not been tested in people younger than 50 years old.   - Influenza: up to date  - [id number]: Prescription called to her pharmacy, she plans to get in 2 weeks  - [id number]: Prescription to be given at least 8 weeks after Prevnar 13  "called to her pharmacy  - Shingrix: Prescription called to her pharmacy, she plans to get in 2 weeks    Ms. Mooney verbalized agreement with and understanding of the rational for the diagnosis and treatment plan.  All questions were answered to best of my ability and the patient's satisfaction. Ms. Mooney was advised to contact the clinic with any questions that may arise after the clinic visit.      Thank you for involving me in the care of the patient    Return to clinic: 1 year, earlier PRN    HPI   Jesica TAMIKA Mooney is a 43 year old female with history of pulmonary embolism and obesity who presents for f/u of Sjogren's Syndrome.     Today, Ms. Mooney reports that she has been doing well. She continues to use frequent sips of water and does not have frequent dental caries. Since last seen she requested a water pill because the pilocarpine was sent to the pharmacy that Xarelto goes to but she only uses that pharmacy for Xarelto and didn't realize it was there, then asked me for a \"water pill\" and I thought she was referring to a diuretic so pilocarpine was not re-Rx'd to the other pharmacy.  Dry eyes are managed by ophthalmology. She would like to try using pilocarpine.     Denies fevers, chills, nausea, vomiting, constipation, diarrhea. No abdominal pain. No chest pain/pressure, palpitations, or shortness of breath. No oral or nasal sores. No neck pain. No rash.      Tobacco: None  EtOH: None  Drugs: None    ROS   GEN: No fevers, chills, or night sweats. No weight loss.   SKIN: No itching, rashes, sores  HEENT:  No oral or nasal ulcers.  CV: No chest pain, pressure, palpitations, or dyspnea on exertion.  PULM: No SOB, wheeze, cough.  GI: No nausea, vomiting, constipation, diarrhea. No blood in stool. No abdominal pain.  : No blood in urine.  MSK: See HPI.  NEURO: No numbness, tingling, or weakness.  EXT: No LE swelling    Active Problem List     Patient Active Problem List   Diagnosis     BMI 40.0-44.9, " "adult (H)     CARDIOVASCULAR SCREENING; LDL GOAL LESS THAN 160     Other acute pulmonary embolism     Sjogren's syndrome (H)     Past Medical History     Past Medical History:   Diagnosis Date     IUD (intrauterine device) in place     Mirena inserted 12/28/15     NO ACTIVE PROBLEMS      Past Surgical History     Past Surgical History:   Procedure Laterality Date     BIOPSY  a fews years ago    A mole was removed from by scalp     C ORAL SURGERY PROCEDURE  10/1990    wisdom teeth extracted     Allergy     Allergies   Allergen Reactions     Keflex [Cephalexin Hcl] Hives     Penicillins Hives     Current Medication List     Current Outpatient Prescriptions   Medication Sig     Doxylamine Succinate, Sleep, (SLEEP AID PO)      levonorgestrel (MIRENA) 20 MCG/24HR IUD 1 each by Intrauterine route once     propylene glycol (SYSTANE BALANCE) 0.6 % SOLN ophthalmic solution Place 1 drop into both eyes 2 times daily \"Systane Balance\"     XARELTO 20 MG TABS tablet TAKE 1 TABLET(20 MG) BY MOUTH DAILY WITH DINNER     No current facility-administered medications for this visit.          Social History   See HPI    Family History     Family History   Problem Relation Age of Onset     Eye Disorder Mother      cataracts      Rheumatoid Arthritis Mother      Cancer - colorectal Paternal Grandmother      ?     Thyroid Disease Father      Breast Cancer Maternal Aunt      diagnosed in 30s (mother's 1/2 sister)     Cancer No family hx of      Diabetes No family hx of      Hypertension No family hx of      Cerebrovascular Disease No family hx of      Macular Degeneration No family hx of      Glaucoma No family hx of      Mother: RA    Physical Exam     Temp Readings from Last 3 Encounters:   12/03/18 99.8  F (37.7  C) (Oral)   11/26/18 99.1  F (37.3  C) (Tympanic)   03/13/18 98.3  F (36.8  C) (Oral)     BP Readings from Last 5 Encounters:   12/03/18 118/78   11/26/18 124/86   03/13/18 124/85   12/04/17 126/84   01/26/17 122/78     Pulse " "Readings from Last 1 Encounters:   12/03/18 72     Resp Readings from Last 1 Encounters:   12/03/18 18     Estimated body mass index is 43.21 kg/(m^2) as calculated from the following:    Height as of 11/26/18: 1.594 m (5' 2.75\").    Weight as of an earlier encounter on 12/3/18: 109.8 kg (242 lb).    GEN: NAD, chewing gum  HEENT: Dry mucous membranes. No oral lesions. Anicteric, noninjected sclera. Eyes appear to have good moisture by gross examination.  CV: S1, S2. RRR. No m/r/g.  PULM: CTA bilaterally. No w/c.  MSK:  MCPs, PIPs, wrists, elbows, shoulders, knees, and ankles without swelling or tenderness to palpation.  Negative MCP and MTP squeeze.   Hips nontender to palpation  NEURO: UE and LE strengths 5/5 and equal bilaterally.   SKIN: No rash. Many moles on the bilateral forearms  EXT: No LE edema  PSYCH: Alert. Appropriate.    Labs / Imaging (select studies)     JAN  Recent Labs   Lab Test  05/24/16   0822  04/14/16   1447  01/12/16   1457   AVA   --   10.9*  11.3*   ANAIGG  >1:98064  Reference range: <1:40  (Note)  Mixed Homogeneous and Speckled pattern.  INTERPRETIVE INFORMATION: JAN by IFA, IgG  Anti-nuclear antibodies (JAN) are seen in a variety of  systemic rheumatic diseases and are determined by indirect  fluorescence assay (IFA) using HEp-2 substrate with an  IgG-specific conjugate. JAN titers less than or equal to  1:80 have variable relevance while titers greater than or  equal to 1:160 are considered clinically significant. These  antibodies may precede clinical disease onset; however,  healthy individuals and those with advanced age have been  reported to be positive for JAN. When observed, one of the  five basic patterns is reported: homogeneous,  peripheral/rim, speckled, centromere, or nucleolar. If  cytoplasmic fluorescence is observed, it is noted. IFA  methodology is subjective and has occasionally been shown  to lack sensitivity for anti-SSA/Ro antibodies.  Negative results do not " necessarily rule out the presence  of SSc. If clinical nelson spicion remains, consider further  testing for U3-RNP, PM/Scl, or Th/To antibodies associated  with SSc.  Performed by [facility],  [facility] [place] [address] [phone]  www.Copyright Agent, Charles Kumar MD, Lab. Director     --    --      RNP/Sm/SSA/SSB  Recent Labs   Lab Test  05/24/16   0822   RNPIGG  0.9   SMIGG  <0.2  Negative   Antibody index (AI) values reflect qualitative changes in antibody   concentration that cannot be directly associated with clinical condition or   disease state.     SSAIGG  >8.0  Positive   Antibody index (AI) values reflect qualitative changes in antibody   concentration that cannot be directly associated with clinical condition or   disease state.  *   SSBIGG  0.3   SCLIGG  <0.2  Negative   Antibody index (AI) values reflect qualitative changes in antibody   concentration that cannot be directly associated with clinical condition or   disease state.       dsDNA  Recent Labs   Lab Test  11/25/16   1512  05/24/16   0822   DNA  5  6     C3/C4  Recent Labs   Lab Test  11/27/17   0846  11/25/16   1512  05/24/16   0822   [id number]  98  98  99   H5LXDXN  21  21  19     Antiphospholipid Antibodies  Recent Labs   Lab Test  04/14/16   1447  01/12/16   1457   B2GPG   --   2.1   B2GPM   --   <0.9  Negative     CARG  <15.0  Interpretation:  Negative    <15.0  Interpretation:  Negative     DEEPTI  <12.5  Interpretation:  Negative    14.7*   LUPINT   --   Negative  (Note)  COMMENTS:  The INR is normal.  APTT ratio is elevated.  Platelet Neutralization is negative.  APTT 1:2 Mix ratio is normal.  DRVVT Screen ratio is normal.  Thrombin time is normal.  NEGATIVE TEST; A LUPUS ANTICOAGULANT WAS NOT DETECTED IN THIS  SPECIMEN WITHIN THE LIMITS OF THE TESTING REPERTOIRE.  If the clinical picture is strongly suggestive of an antiphospholipid  syndrome, recommend anticardiolipin and beta-2-glycoprotein (IgG and  IgM) antibody  tests.  Platelet Neutralization and APTT 1:2 Mix ratio are suggestive of  factor deficiency.  Recommend factors 8, 9, 11 and 12 (factors VIII, IX, XI, and XII)  levels if clinically indicated.  Nimisha Lovell M.D.  [phone]  1/15/2016    INR =  1.06    Reference range: 0.86-1.14  Thrombin Time= 16.5    Reference range: 13.0-19.0 sec    APTT:       Ratio  Patient  =  1.21  1:2 Mix  =  1.05  Reference:  Negative: Less than or equal to 1.16  Positive: Greater than or equal to 1.17    Platelet Neutralization (second s):  PTT Buffer-PTT Platelet Lysate =  0  Reference:  Negative: Less than or equal to 0  Positive: Greater than or equal to 1     DILUTE BRANDY VIPER VENOM TEST:  Screen Ratio = 1.15   Normal is less than 1.21         CBC  Recent Labs   Lab Test  11/26/18   0830  11/27/17   0846  11/25/16   1512   WBC  7.3  5.9  6.8   RBC  4.91  4.99  4.70   HGB  15.1  15.2  14.3   HCT  44.4  45.0  42.1   MCV  90  90  90   RDW  12.6  12.5  12.3   PLT  246  243  252   MCH  30.8  30.5  30.4   MCHC  34.0  33.8  34.0   NEUTROPHIL  44.9  44.5  38.2   LYMPH  39.3  38.1  40.0   MONOCYTE  9.4  8.8  8.8   EOSINOPHIL  6.1  8.1  12.3   BASOPHIL  0.3  0.5  0.7   ANEU  3.3  2.6  2.6   ALYM  2.9  2.3  2.7   CARLY  0.7  0.5  0.6   AEOS  0.4  0.5  0.8*   ABAS  0.0  0.0  0.1     CMP  Recent Labs   Lab Test  11/26/18   0830  11/27/17   0846  11/25/16   1512  05/24/16   0822   08/26/13   0750  04/30/12   0836   NA  138  137  139  138   --    --    --    POTASSIUM  4.1  4.0  3.8  4.0   --    --    --    CHLORIDE  106  105  105  106   --    --    --    CO2  27  29  30  26   --    --    --    ANIONGAP  5  3  4  6   --    --    --    GLC  96  88  97  92   --   83  83   BUN  14  14  12  14   --    --    --    CR  0.81  0.90  0.98  0.78   < >   --    --    GFRESTIMATED  77  68  62  81   --    --    --    GFRESTBLACK  >90  82  76  >90   GFR Calc     --    --    --    BROOKS  9.3  9.2  8.9  9.1   --    --    --    BILITOTAL  0.3   0.4  0.4  0.5   < >   --    --    ALBUMIN  3.6  3.8  3.6  3.4   < >   --    --    PROTTOTAL  8.1  8.5  8.1  7.8   < >   --    --    ALKPHOS  63  65  66  63   < >   --    --    AST  25  26  30  28   < >   --    --    ALT  35  42  56*  38   < >   --    --     < > = values in this interval not displayed.     Calcium/VitaminD  Recent Labs   Lab Test  11/26/18   0830  11/27/17   0846  11/25/16   1512   BOROKS  9.3  9.2  8.9     ESR/CRP  Recent Labs   Lab Test  11/27/17   0846  11/25/16   1512  05/24/16   0822   SED  15  23*  17   CRP  <2.9  <2.9  <2.9     CK/Aldolase  Recent Labs   Lab Test  11/25/16   1512  05/24/16   0822   CKT  96  72     TSH/T4  Recent Labs   Lab Test  08/26/13   0750  04/30/12   0836   TSH  1.71  2.80     UA  Recent Labs   Lab Test  11/26/18   0838  11/27/17   0856  11/25/16   1609   COLOR  Yellow  Yellow  Yellow   APPEARANCE  Clear  Clear  Clear   URINEGLC  Negative  Negative  Negative   URINEBILI  Negative  Negative  Negative   SG  1.010  <=1.005  1.010   URINEPH  6.5  6.0  6.0   PROTEIN  Negative  Negative  Negative   UROBILINOGEN  0.2  0.2  0.2   NITRITE  Negative  Negative  Negative   UBLD  Trace*  Small*  Trace*   LEUKEST  Trace*  Negative  Negative   WBCU  0 - 5  O - 2  O - 2   RBCU  2-5*  2-5*  O - 2   SQUAMOUSEPI  Few  Few  Few     Urine Microscopic  Recent Labs   Lab Test  11/26/18   0838  11/27/17   0856  11/25/16   1609   WBCU  0 - 5  O - 2  O - 2   RBCU  2-5*  2-5*  O - 2   SQUAMOUSEPI  Few  Few  Few     Urine Protein  Recent Labs   Lab Test  11/26/18   0838  11/27/17   0856  11/25/16   1609   UTP  <0.05  <0.05  <0.05   UTPG  Unable to calculate due to low value  Unable to calculate due to low value  Unable to calculate due to low value   UCRR  46  36   --      Immunization History     Immunization History   Administered Date(s) Administered     Influenza (IIV3) PF 10/02/2012     Influenza Intranasal Vaccine 4 valent 10/11/2013, 10/16/2014, 10/11/2016     Influenza Vaccine IM 3yrs+ 4  Valent IIV4 10/05/2015, 10/13/2017, 10/15/2018     TDAP Vaccine (Adacel) 12/03/2018     Tdap (Adacel,Boostrix) 01/17/2008     Twinrix A/B 12/17/2012, 01/16/2013, 06/18/2013          Chart documentation done in part with Dragon Voice recognition Software. Although reviewed after completion, some word and grammatical error may remain.    Dylan Donato MD

## 2018-12-03 NOTE — MR AVS SNAPSHOT
After Visit Summary   12/3/2018    Jesica Mooney    MRN: [id number]           Patient Information     Date Of Birth          1975        Visit Information        Provider Department      12/3/2018 7:40 AM Riddhi Almaguer PA-C [facility]        Today's Diagnoses     Skin tag    -  1    Need for Tdap vaccination           Follow-ups after your visit        Follow-up notes from your care team     Return in about 1 year (around 12/3/2019) for Physical Exam.      Your next 10 appointments already scheduled     Dec 03, 2018 11:40 AM CST   Return Visit with Dylan Donato MD   [facility] ([facility])    [address] [address] [phone]            Mar 12, 2019 11:30 AM CDT   RETURN CLOTTING DISORDER with Vijay Jerome PA-C   [facility] ([facility])    [facility] [place] [place] [address] [phone]              Who to contact     If you have questions or need follow up information about today's clinic visit or your schedule please contact [facility] directly at [phone].  Normal or non-critical lab and imaging results will be communicated to you by MyChart, letter or phone within 4 business days after the clinic has received the results. If you do not hear from us within 7 days, please contact the clinic through MyChart or phone. If you have a critical or abnormal lab result, we will notify you by phone as soon as possible.  Submit refill requests through [employer] or call your pharmacy and they will forward the refill request to us. Please allow 3 business days for your refill to be completed.          Additional Information About Your Visit        MyChart Information     [employer] gives you secure access to your electronic health record. If you see a primary care provider, you can also send messages to your  care team and make appointments. If you have questions, please call your primary care clinic.  If you do not have a primary care provider, please call [phone] and they will assist you.        Care EveryWhere ID     This is your Care EveryWhere ID. This could be used by other organizations to access your [facility] medical records  [id number]        Your Vitals Were     Pulse Temperature Respirations Pulse Oximetry Breastfeeding? BMI (Body Mass Index)    72 99.8  F (37.7  C) (Oral) 18 96% No 43.21 kg/m2       Blood Pressure from Last 3 Encounters:   12/03/18 118/78   11/26/18 124/86   03/13/18 124/85    Weight from Last 3 Encounters:   12/03/18 242 lb (109.8 kg)   11/26/18 243 lb 3.2 oz (110.3 kg)   03/13/18 247 lb 4.8 oz (112.2 kg)              We Performed the Following     ADMIN 1st VACCINE     C DESTROY < 15 BENIGN SKIN LESIONS     TDAP VACCINE (ADACEL)        Primary Care Provider Office Phone # Fax #    [facility] [phone]       No address on file        Equal Access to Services     ELKE LOPEZ : Hadii ada tipton Sodionna, waaxda ludamionadaha, qaybta kaalmada victor hugo, buck louise . So [facility] [phone].    ATENCIÓN: Si habla español, tiene a nelson disposición servicios gratuitos de asistencia lingüística. Llame al [phone].    We comply with applicable federal civil rights laws and [place] laws. We do not discriminate on the basis of race, color, national origin, age, disability, sex, sexual orientation, or gender identity.            Thank you!     Thank you for choosing [facility]  for your care. Our goal is always to provide you with excellent care. Hearing back from our patients is one way we can continue to improve our services. Please take a few minutes to complete the written survey that you may receive in the mail after your visit with us. Thank you!             Your Updated Medication List - Protect others around you: Learn  "how to safely use, store and throw away your medicines at www.disposemymeds.org.          This list is accurate as of 12/3/18  8:27 AM.  Always use your most recent med list.                   Brand Name Dispense Instructions for use Diagnosis    levonorgestrel 20 MCG/24HR IUD    MIRENA     1 each by Intrauterine route once        propylene glycol 0.6 % Soln ophthalmic solution    SYSTANE BALANCE    1 Bottle    Place 1 drop into both eyes 2 times daily \"Systane Balance\"    Dry eyes, bilateral       SLEEP AID PO       JAN positive       XARELTO 20 MG Tabs tablet   Generic drug:  rivaroxaban ANTICOAGULANT     90 tablet    TAKE 1 TABLET(20 MG) BY MOUTH DAILY WITH DINNER    Pulmonary embolism (H)         "

## 2018-12-04 PROBLEM — Z12.4 SCREENING FOR CERVICAL CANCER: Status: ACTIVE | Noted: 2018-12-04

## 2018-12-21 ENCOUNTER — MYC MEDICAL ADVICE (OUTPATIENT)
Dept: OBGYN | Facility: CLINIC | Age: 43
End: 2018-12-21

## 2018-12-21 DIAGNOSIS — N89.8 VAGINAL ODOR: Primary | ICD-10-CM

## 2018-12-21 NOTE — TELEPHONE ENCOUNTER
Contur message routed to provider for review and response. Please advise.      Lorna Canela CMA  2:38 PM  December 21, 2018

## 2019-01-05 DIAGNOSIS — N89.8 VAGINAL ODOR: ICD-10-CM

## 2019-01-05 LAB
SPECIMEN SOURCE: NORMAL
WET PREP SPEC: NORMAL

## 2019-01-05 PROCEDURE — 87210 SMEAR WET MOUNT SALINE/INK: CPT | Performed by: NURSE PRACTITIONER

## 2019-03-04 ENCOUNTER — OFFICE VISIT (OUTPATIENT)
Dept: OPTOMETRY | Facility: CLINIC | Age: 44
End: 2019-03-04
Payer: COMMERCIAL

## 2019-03-04 DIAGNOSIS — H04.123 DRY EYES, BILATERAL: ICD-10-CM

## 2019-03-04 DIAGNOSIS — M35.00 SJOGREN'S SYNDROME, WITH UNSPECIFIED ORGAN INVOLVEMENT (H): ICD-10-CM

## 2019-03-04 DIAGNOSIS — H52.13 MYOPIA OF BOTH EYES: Primary | ICD-10-CM

## 2019-03-04 PROCEDURE — 92014 COMPRE OPH EXAM EST PT 1/>: CPT | Performed by: OPTOMETRIST

## 2019-03-04 PROCEDURE — 92015 DETERMINE REFRACTIVE STATE: CPT | Performed by: OPTOMETRIST

## 2019-03-04 ASSESSMENT — VISUAL ACUITY
METHOD: SNELLEN - LINEAR
OD_CC+: -1
OS_CC+: -1
CORRECTION_TYPE: GLASSES
OS_CC: 20/30
OD_CC: 20/25
OD_CC: 20/20-2
OS_CC: 20/20

## 2019-03-04 ASSESSMENT — REFRACTION_WEARINGRX
OS_AXIS: 085
OS_HBASE: DOWN
OD_AXIS: 070
OD_CYLINDER: +1.25
OS_CYLINDER: +1.50
OS_SPHERE: -9.00
OS_HPRISM: 1
OS_CYLINDER: +1.50
SPECS_TYPE: SVL
SPECS_TYPE: SVL
OS_SPHERE: -9.00
OS_AXIS: 085
OD_SPHERE: -9.75
OD_CYLINDER: +1.25
OD_AXIS: 070
OD_SPHERE: -9.75

## 2019-03-04 ASSESSMENT — REFRACTION_MANIFEST
OS_ADD: +1.25
OD_SPHERE: -10.25
OS_SPHERE: -9.75
OD_AXIS: 070
OD_ADD: +1.25
OD_CYLINDER: +1.50
OS_CYLINDER: +2.00
OD_AXIS: 076
OS_CYLINDER: +1.75
OD_SPHERE: -10.25
OS_AXIS: 075
OD_CYLINDER: +2.00
OS_AXIS: 075
OS_SPHERE: -9.75
METHOD_AUTOREFRACTION: 1

## 2019-03-04 ASSESSMENT — KERATOMETRY
OS_AXISANGLE2_DEGREES: 169
OD_K1POWER_DIOPTERS: 44.50
OD_K2POWER_DIOPTERS: 46.25
OS_K1POWER_DIOPTERS: 45.00
OS_K2POWER_DIOPTERS: 46.75
OD_AXISANGLE2_DEGREES: 165

## 2019-03-04 ASSESSMENT — CONF VISUAL FIELD
METHOD: COUNTING FINGERS
OD_NORMAL: 1
OS_NORMAL: 1

## 2019-03-04 ASSESSMENT — SLIT LAMP EXAM - LIDS
COMMENTS: NORMAL
COMMENTS: NORMAL

## 2019-03-04 ASSESSMENT — TONOMETRY
IOP_METHOD: APPLANATION
OS_IOP_MMHG: 14
OD_IOP_MMHG: 14

## 2019-03-04 ASSESSMENT — EXTERNAL EXAM - LEFT EYE: OS_EXAM: NORMAL

## 2019-03-04 ASSESSMENT — EXTERNAL EXAM - RIGHT EYE: OD_EXAM: NORMAL

## 2019-03-04 ASSESSMENT — CUP TO DISC RATIO
OS_RATIO: 0.2
OD_RATIO: 0.2

## 2019-03-04 NOTE — PROGRESS NOTES
Chief Complaint   Patient presents with     Annual Eye Exam        Discontinued  Pilocarpine 2 weeks ago since it might effect vision - not sure if helping saliva     Last Eye Exam: 3/15/2017  Dilated Previously: Yes    What are you currently using to see?  Glasses, wears her glasses all of the time        Distance Vision Acuity: Satisfied with vision, not aware of any changes    Near Vision Acuity: Satisfied with vision while reading and using computer with glasses    Eye Comfort: good  Do you use eye drops? : Yes: Uses Systane Balance drops 2 times a day, also uses the nonpreserved artificial tear  tears as needed, too expensive to use every day   Occupation or Hobbies:  for [employer]     Liudmila RegeneMed Optometric Assistant       Hot compress twice a day then uses artificial tears     Medical, surgical and family histories reviewed and updated 3/4/2019.       OBJECTIVE: See Ophthalmology exam    ASSESSMENT:    ICD-10-CM    1. Myopia of both eyes H52.13    2. Sjogren's syndrome, with unspecified organ involvement (H) M35.00    3. Dry eyes, bilateral H04.123       PLAN:     Patient Instructions   Patient was advised of today's exam findings.  Optional to fill new glasses prescription, minimal change  Talked about Restasis - will contact me to order in next 6 months if wanted   Return in 1 year for eye exam    Minnie Akers O.D.  [facility]   [address] [place], [place] [id number] [phone]

## 2019-03-05 ENCOUNTER — OFFICE VISIT (OUTPATIENT)
Dept: OPTOMETRY | Facility: CLINIC | Age: 44
End: 2019-03-05
Payer: COMMERCIAL

## 2019-03-05 DIAGNOSIS — H53.10 SUBJECTIVE VISION DISTURBANCE, LEFT: Primary | ICD-10-CM

## 2019-03-05 PROCEDURE — 99212 OFFICE O/P EST SF 10 MIN: CPT | Performed by: OPTOMETRIST

## 2019-03-05 ASSESSMENT — SLIT LAMP EXAM - LIDS
COMMENTS: NORMAL
COMMENTS: NORMAL

## 2019-03-05 ASSESSMENT — VISUAL ACUITY
METHOD: SNELLEN - LINEAR
OS_CC: 20/40
CORRECTION_TYPE: GLASSES
OD_CC: 20/25
OD_CC+: +1

## 2019-03-05 NOTE — PROGRESS NOTES
Chief Complaint   Patient presents with     Eye Problem Left Eye     Eye Still dilated, Had Exam late yesterday              Liudmila Tariq Optometric Assistant     Review of Symptoms    EYES: See HPI  CONSTITUTIONAL: patient reports feeling healthy          Medical, surgical and family histories reviewed and updated 3/5/2019.         OBJECTIVE: See Ophthalmology exam    ASSESSMENT:    ICD-10-CM    1. Subjective vision disturbance, left H53.10       PLAN:    Patient Instructions   Patient was advised of today's exam findings.  Reassured that sometime dilation drops last longer than expected.    Should be normal by Wednesday am- call us if pupils are not equal  Good near vision - should be fine to work today    Minnie Akers O.D.  [facility]   [address] [place] [address] [phone]

## 2019-03-05 NOTE — PATIENT INSTRUCTIONS
Patient was advised of today's exam findings.  Optional to fill new glasses prescription, minimal change  Talked about Restasis - will contact me to order in next 6 months if wanted   Return in 1 year for eye exam    Minnie Akers O.D.  [facility]   [address] [place], [place] [id number] [phone]

## 2019-03-05 NOTE — PATIENT INSTRUCTIONS
Patient was advised of today's exam findings.  Reassured that sometime dilation drops last longer than expected.    Should be normal by Wednesday am- call us if pupils are not equal  Good near vision - should be fine to work today    Minnie Akers O.D.  [facility]   [address] [place] [address] [phone]

## 2019-03-12 ENCOUNTER — ALLIED HEALTH/NURSE VISIT (OUTPATIENT)
Dept: NURSING | Facility: CLINIC | Age: 44
End: 2019-03-12
Payer: COMMERCIAL

## 2019-03-12 ENCOUNTER — OFFICE VISIT (OUTPATIENT)
Dept: HEMATOLOGY | Facility: CLINIC | Age: 44
End: 2019-03-12
Attending: INTERNAL MEDICINE
Payer: COMMERCIAL

## 2019-03-12 VITALS
SYSTOLIC BLOOD PRESSURE: 128 MMHG | HEIGHT: 63 IN | RESPIRATION RATE: 12 BRPM | WEIGHT: 246.2 LBS | HEART RATE: 77 BPM | TEMPERATURE: 98.4 F | BODY MASS INDEX: 43.62 KG/M2 | OXYGEN SATURATION: 95 % | DIASTOLIC BLOOD PRESSURE: 88 MMHG

## 2019-03-12 DIAGNOSIS — Z79.01 CHRONIC ANTICOAGULATION: Primary | ICD-10-CM

## 2019-03-12 DIAGNOSIS — Z23 NEED FOR VACCINATION: Primary | ICD-10-CM

## 2019-03-12 PROCEDURE — 90471 IMMUNIZATION ADMIN: CPT

## 2019-03-12 PROCEDURE — 90750 HZV VACC RECOMBINANT IM: CPT

## 2019-03-12 PROCEDURE — 90472 IMMUNIZATION ADMIN EACH ADD: CPT

## 2019-03-12 PROCEDURE — 90670 PCV13 VACCINE IM: CPT

## 2019-03-12 PROCEDURE — 40000269 ZZH STATISTIC NO CHARGE FACILITY FEE

## 2019-03-12 PROCEDURE — 99214 OFFICE O/P EST MOD 30 MIN: CPT | Performed by: PHYSICIAN ASSISTANT

## 2019-03-12 ASSESSMENT — MIFFLIN-ST. JEOR: SCORE: 1735.89

## 2019-03-12 ASSESSMENT — PAIN SCALES - GENERAL: PAINLEVEL: NO PAIN (0)

## 2019-03-12 NOTE — NURSING NOTE
Jesica Mooney is here for her annual visit on long term anticoagulation.  She continues on Xarelto 20 mg daily.    Welcomed  her to our center and provided her with a contact card with our contact phone numbers.    I asked her if she had any specific concerns that she wanted addressed and communicated those to the provider.  I did review which provider she going to see today and the timing of seeing that provider.    Medications and allergies were reviewed with minor changes.     I thanked her for coming in and encouraged her to call with any concerns or questions.    Jennifer Taveras RN - Nurse Clinician - [facility] - [phone]

## 2019-03-12 NOTE — PROGRESS NOTES
Prior to injection, verified patient identity using patient's name and date of birth.  Due to injection administration, patient instructed to remain in clinic for 15 minutes  afterwards, and to report any adverse reaction to me immediately.    Marifer Grey MA      Screening Questionnaire for Adult Immunization    Are you sick today?   No   Do you have allergies to medications, food, a vaccine component or latex?   No   Have you ever had a serious reaction after receiving a vaccination?   No   Do you have a long-term health problem with heart disease, lung disease, asthma, kidney disease, metabolic disease (e.g. diabetes), anemia, or other blood disorder?   No   Do you have cancer, leukemia, HIV/AIDS, or any other immune system problem?   No   In the past 3 months, have you taken medications that affect  your immune system, such as prednisone, other steroids, or anticancer drugs; drugs for the treatment of rheumatoid arthritis, Crohn s disease, or psoriasis; or have you had radiation treatments?   No   Have you had a seizure, or a brain or other nervous system problem?   No   During the past year, have you received a transfusion of blood or blood     products, or been given immune (gamma) globulin or antiviral drug?   No   For women: Are you pregnant or is there a chance you could become        pregnant during the next month?   No   Have you received any vaccinations in the past 4 weeks?   No     Immunization questionnaire answers were all negative.        Per orders of Dr. Donato, injection of Prevnar 13, Shingrix given by Marifer Grey. Patient instructed to remain in clinic for 15 minutes afterwards, and to report any adverse reaction to me immediately.       Screening performed by Marifer Grey on 3/12/2019 at 1:28 PM.

## 2019-03-12 NOTE — PROGRESS NOTES
[facility]  [address] [place] [id number]  Main: [phone], Fax: [phone]    Patient seen at: [facility] [address] [place] [address] Clinic    Outpatient Visit Note:    Patient: Jesica Mooney  MRN: [id number]  : 1975  OWEN: 3/12/19    Reason:  History of pulmonary embolism. History of antithrombin deficiency. On chronic anticoagulation therapy. Here for her annual follow up clinic visit.    HPI:  This is a 43-year-old female without any significant past medical history who in Dec 2015 presented to an outside hospital with pleuritic chest pain and was found to have a left lower lobe pulmonary embolism.  At that time, she was using estrogen-containing oral contraceptive pills.  Otherwise, prior to that episode she was feeling fine without any recent illnesses or long travel or any other provoking factors.  A CTA showed acute pulmonary emboli in the lower lobe and segmental and subsegmental pulmonary arteries.  She was started on Xarelto and oral contraceptive pills were discontinued.  At that time there was no hypercoagulable workup performed, but later on she saw Dr. Leon at [facility] who did a hypercoagulable workup which showed a very high-titer positive JAN, persistently elevated D-dimer around 2.5, and persistently slightly low levels of antithrombin III in the 70s.  Her lupus inhibitor, cardiolipin antibodies, beta-2 glycoprotein antibodies, protein C and protein S levels, and factor II and factor V Leiden mutations were negative.  She was subsequently referred to Rheumatology for high positive JAN titer who diagnosed her with Sjogren syndrome.  She continues to take Xarelto without any problems.  She had an ultrasound of the lower extremities, not at the time of presentation for PE, but in May 2016 which was unremarkable and did not show any DVT.      The patient underwent initial consultation with  Dr. Ata Rendon back in June 14th 2016. At the time, Dr. Rendon recommended that she should maintain on anticoagulation while undergo gene sequencing for antithrombin deficiency. 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 However, Dr. Rendon felt that this likely is a pathologic gene mutation as this mutation has been identified in some individuals with a history of clotting. Thus it was recommended that Jesica is to remain on indefinite anticoagulation therapy.    Interim History:  Jesica reports that in the past 3 years, she has remained on Xarelto at 20 mg PO Qday dosing and has been tolerating it well. She rarely miss any doses. She denies any significant bleeding complications while on the medications. In fact, she reports that she does not even felt like she is taking the medication. She has an IUD placed shortly after her pulmonary embolic event back in 2015 without any current issues of heavy menstrual bleeding. She also denies any issues with frequent epistaxis, no gum bleeding. Denies any hematuria or blood in stools. Denies any frequent bruising.     Medications:  Current Outpatient Medications   Medication     Doxylamine Succinate, Sleep, (SLEEP AID PO)     levonorgestrel (MIRENA) 20 MCG/24HR IUD     pilocarpine (SALAGEN) 5 MG tablet     propylene glycol (SYSTANE BALANCE) 0.6 % SOLN ophthalmic solution     rivaroxaban ANTICOAGULANT (XARELTO) 20 MG TABS tablet     XARELTO 20 MG TABS tablet     No current facility-administered medications for this visit.      Allergies:  Allergies   Allergen Reactions     Keflex [Cephalexin Hcl] Hives     Penicillins Hives     PmHx:  Past Medical History:   Diagnosis Date     IUD (intrauterine device) in place     Mirena inserted 12/28/15     Social History and Family History:  Deferred.    ROS:  As above. Denies any shortness of breath. No chest pain.    Objective:  Pleasant 43 year old female in no  "acute distress.  /88 (BP Location: Right arm, Patient Position: Sitting, Cuff Size: Adult Regular)   Pulse 77   Temp 98.4  F (36.9  C) (Oral)   Resp 12   Ht 1.6 m (5' 3\")   Wt 111.7 kg (246 lb 3.2 oz)   SpO2 95%   BMI 43.61 kg/m    Exam:  Complete exam is not performed today.    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 In general, Jesica is doing very well with Xarelto without any issues with bleeding complications or any recurrent venous thromboembolic events. She is very content with this regimen.     Diagnosis:  1. History of pulmonary embolism - Dec 2015.   2. Antithrombin deficiency.  3. 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     Plan:  No change in regard to her current anticoagulation therapy with Xarelto at 20 mg PO Qday dosing.     She will call if she  has any unusual bleeding issues or if she needs any invasive or surgical procedures. At which time, our center can provide anticoagulation management recommendation surrounding these procedures.    We will plan on seeing her back annually.  Xarelto Rx refilled today.       Total Time Spent:  25 minutes, all 25 minutes was spent on face-to-face consultation with the patient and coordination of care in regard to her history of pulmonary embolism and antithrombin deficiency as well as anticoagulation therapy management.          Lesly Baxter MPH, PA-C  [facility]  [facility]  [phone] main line  [phone] " pager  [phone] fax

## 2019-09-03 ENCOUNTER — ALLIED HEALTH/NURSE VISIT (OUTPATIENT)
Dept: NURSING | Facility: CLINIC | Age: 44
End: 2019-09-03
Payer: COMMERCIAL

## 2019-09-03 DIAGNOSIS — Z23 NEED FOR VACCINATION: Primary | ICD-10-CM

## 2019-09-03 PROCEDURE — 90472 IMMUNIZATION ADMIN EACH ADD: CPT

## 2019-09-03 PROCEDURE — 90732 PPSV23 VACC 2 YRS+ SUBQ/IM: CPT

## 2019-09-03 PROCEDURE — 90471 IMMUNIZATION ADMIN: CPT

## 2019-09-03 PROCEDURE — 90750 HZV VACC RECOMBINANT IM: CPT

## 2019-09-03 NOTE — NURSING NOTE
Prior to immunization administration, verified patients identity using patient s name and date of birth. Please see Immunization Activity for additional information.     Screening Questionnaire for Adult Immunization    Are you sick today?   No   Do you have allergies to medications, food, a vaccine component or latex?   No   Have you ever had a serious reaction after receiving a vaccination?   No   Do you have a long-term health problem with heart disease, lung disease, asthma, kidney disease, metabolic disease (e.g. diabetes), anemia, or other blood disorder?   No   Do you have cancer, leukemia, HIV/AIDS, or any other immune system problem?   No   In the past 3 months, have you taken medications that affect  your immune system, such as prednisone, other steroids, or anticancer drugs; drugs for the treatment of rheumatoid arthritis, Crohn s disease, or psoriasis; or have you had radiation treatments?   No   Have you had a seizure, or a brain or other nervous system problem?   No   During the past year, have you received a transfusion of blood or blood     products, or been given immune (gamma) globulin or antiviral drug?   No   For women: Are you pregnant or is there a chance you could become        pregnant during the next month?   No   Have you received any vaccinations in the past 4 weeks?   No     Immunization questionnaire answers were all negative.        Per orders of Donato , injection of Pneumonia 23 and Shingrix given by Karyn Chaidez CMA. Patient instructed to remain in clinic for 15 minutes afterwards, and to report any adverse reaction to me immediately.       Screening performed by Karyn Chaidez CMA on 9/3/2019 at 9:56 AM.

## 2019-09-16 ENCOUNTER — OFFICE VISIT (OUTPATIENT)
Dept: FAMILY MEDICINE | Facility: CLINIC | Age: 44
End: 2019-09-16
Payer: COMMERCIAL

## 2019-09-16 VITALS
WEIGHT: 247 LBS | OXYGEN SATURATION: 96 % | TEMPERATURE: 98.6 F | DIASTOLIC BLOOD PRESSURE: 82 MMHG | SYSTOLIC BLOOD PRESSURE: 122 MMHG | HEART RATE: 74 BPM | BODY MASS INDEX: 43.75 KG/M2

## 2019-09-16 DIAGNOSIS — Z23 NEED FOR PROPHYLACTIC VACCINATION AND INOCULATION AGAINST INFLUENZA: ICD-10-CM

## 2019-09-16 DIAGNOSIS — L81.9 CHANGE IN MULTIPLE PIGMENTED SKIN LESIONS: Primary | ICD-10-CM

## 2019-09-16 PROCEDURE — 99213 OFFICE O/P EST LOW 20 MIN: CPT | Mod: 25 | Performed by: PHYSICIAN ASSISTANT

## 2019-09-16 PROCEDURE — 90471 IMMUNIZATION ADMIN: CPT | Performed by: PHYSICIAN ASSISTANT

## 2019-09-16 PROCEDURE — 90686 IIV4 VACC NO PRSV 0.5 ML IM: CPT | Performed by: PHYSICIAN ASSISTANT

## 2019-09-16 ASSESSMENT — PAIN SCALES - GENERAL: PAINLEVEL: NO PAIN (0)

## 2019-09-16 NOTE — PROGRESS NOTES
Subjective     Jesica Mooney is a 44 year old female who presents to clinic today for the following health issues:    HPI   Moles checked 2 on Right side of face, 1 on left side of face  3 on chest and 3 on Left side of bottom    The moles along her hairline have changed in color and size. She has a hard time checking the ones on her shoulder and buttocks and not sure if there has been changes.           Reviewed and updated as needed this visit by Provider         Review of Systems   ROS COMP: Constitutional, HEENT, cardiovascular, pulmonary, GI, , musculoskeletal, neuro, skin, endocrine and psych systems are negative, except as otherwise noted.      Objective    /82   Pulse 74   Temp 98.6  F (37  C) (Oral)   Wt 112 kg (247 lb)   SpO2 96%   BMI 43.75 kg/m    Body mass index is 43.75 kg/m .  Physical Exam   GENERAL: healthy, alert and no distress  SKIN: there is 1 raised lesion along her left forehead hairline, 2 dark  lesions on the right hairline also.   The lesion on her right chest is dark in color with an irregular border.   The lesions on her buttock are dark in color, the one on the right buttock is more suspicious than the left side.       Diagnostic Test Results:  Labs reviewed in Epic        Assessment & Plan     1. Change in multiple pigmented skin lesions  She is going to make a consultation appointment with Dermatology, she may need some of these lesions either removed or biopsied.   - DERMATOLOGY REFERRAL    2. Need for prophylactic vaccination and inoculation against influenza  - INFLUENZA VACCINE IM > 6 MONTHS VALENT IIV4 [67419]  - Vaccine Administration, Initial [26474]         Return in about 1 year (around 9/16/2020) for with primary provider, Routine Visit.    Kristen M. Kehr, PA-C  [facility]

## 2019-10-22 ENCOUNTER — TELEPHONE (OUTPATIENT)
Dept: DERMATOLOGY | Facility: CLINIC | Age: 44
End: 2019-10-22

## 2019-10-23 NOTE — TELEPHONE ENCOUNTER
Reason for call:  Other   Patient called regarding (reason for call): appointment and call back  Additional comments: Requestion call back to schedule appointment     Phone number to reach patient:  Home number on file [phone] (home)    Best Time:  7am-8am is best    Can we leave a detailed message on this number?  YES

## 2019-10-23 NOTE — TELEPHONE ENCOUNTER
Spoke to pt regarding note below. Pt scheduled with Dr. Gee for skin check. Has seen dermatologist before, many years ago. Will try to obtain records prior to appointment.  Elis Chavez LPN

## 2019-11-25 ENCOUNTER — OFFICE VISIT (OUTPATIENT)
Dept: DERMATOLOGY | Facility: CLINIC | Age: 44
End: 2019-11-25
Payer: COMMERCIAL

## 2019-11-25 DIAGNOSIS — L81.4 SOLAR LENTIGO: ICD-10-CM

## 2019-11-25 DIAGNOSIS — D22.9 MULTIPLE BENIGN NEVI: Primary | ICD-10-CM

## 2019-11-25 DIAGNOSIS — D18.01 CHERRY ANGIOMA: ICD-10-CM

## 2019-11-25 DIAGNOSIS — M35.01 SJOGREN'S SYNDROME WITH KERATOCONJUNCTIVITIS SICCA (H): ICD-10-CM

## 2019-11-25 DIAGNOSIS — L82.1 SEBORRHEIC KERATOSIS: ICD-10-CM

## 2019-11-25 DIAGNOSIS — L73.8 SENILE SEBACEOUS GLAND HYPERPLASIA: ICD-10-CM

## 2019-11-25 LAB
ALBUMIN SERPL-MCNC: 3.6 G/DL (ref 3.4–5)
ALBUMIN UR-MCNC: NEGATIVE MG/DL
ALP SERPL-CCNC: 61 U/L (ref 40–150)
ALT SERPL W P-5'-P-CCNC: 36 U/L (ref 0–50)
ANION GAP SERPL CALCULATED.3IONS-SCNC: 1 MMOL/L (ref 3–14)
APPEARANCE UR: CLEAR
AST SERPL W P-5'-P-CCNC: 22 U/L (ref 0–45)
BACTERIA #/AREA URNS HPF: ABNORMAL /HPF
BASOPHILS # BLD AUTO: 0 10E9/L (ref 0–0.2)
BASOPHILS NFR BLD AUTO: 0.4 %
BILIRUB SERPL-MCNC: 0.4 MG/DL (ref 0.2–1.3)
BILIRUB UR QL STRIP: NEGATIVE
BUN SERPL-MCNC: 13 MG/DL (ref 7–30)
CALCIUM SERPL-MCNC: 9.6 MG/DL (ref 8.5–10.1)
CHLORIDE SERPL-SCNC: 108 MMOL/L (ref 94–109)
CO2 SERPL-SCNC: 28 MMOL/L (ref 20–32)
COLOR UR AUTO: YELLOW
CREAT SERPL-MCNC: 0.78 MG/DL (ref 0.52–1.04)
CREAT UR-MCNC: 81 MG/DL
DIFFERENTIAL METHOD BLD: NORMAL
EOSINOPHIL # BLD AUTO: 0.6 10E9/L (ref 0–0.7)
EOSINOPHIL NFR BLD AUTO: 8.3 %
ERYTHROCYTE [DISTWIDTH] IN BLOOD BY AUTOMATED COUNT: 12.8 % (ref 10–15)
GFR SERPL CREATININE-BSD FRML MDRD: >90 ML/MIN/{1.73_M2}
GLUCOSE SERPL-MCNC: 98 MG/DL (ref 70–99)
GLUCOSE UR STRIP-MCNC: NEGATIVE MG/DL
HCT VFR BLD AUTO: 46.9 % (ref 35–47)
HGB BLD-MCNC: 15.7 G/DL (ref 11.7–15.7)
HGB UR QL STRIP: ABNORMAL
KETONES UR STRIP-MCNC: NEGATIVE MG/DL
LEUKOCYTE ESTERASE UR QL STRIP: NEGATIVE
LYMPHOCYTES # BLD AUTO: 2.4 10E9/L (ref 0.8–5.3)
LYMPHOCYTES NFR BLD AUTO: 33.7 %
MCH RBC QN AUTO: 30.6 PG (ref 26.5–33)
MCHC RBC AUTO-ENTMCNC: 33.5 G/DL (ref 31.5–36.5)
MCV RBC AUTO: 91 FL (ref 78–100)
MONOCYTES # BLD AUTO: 0.7 10E9/L (ref 0–1.3)
MONOCYTES NFR BLD AUTO: 9.7 %
NEUTROPHILS # BLD AUTO: 3.3 10E9/L (ref 1.6–8.3)
NEUTROPHILS NFR BLD AUTO: 47.9 %
NITRATE UR QL: NEGATIVE
NON-SQ EPI CELLS #/AREA URNS LPF: ABNORMAL /LPF
PH UR STRIP: 6.5 PH (ref 5–7)
PLATELET # BLD AUTO: 248 10E9/L (ref 150–450)
POTASSIUM SERPL-SCNC: 4.2 MMOL/L (ref 3.4–5.3)
PROT SERPL-MCNC: 7.9 G/DL (ref 6.8–8.8)
PROT UR-MCNC: 0.06 G/L
PROT/CREAT 24H UR: 0.07 G/G CR (ref 0–0.2)
RBC # BLD AUTO: 5.13 10E12/L (ref 3.8–5.2)
RBC #/AREA URNS AUTO: ABNORMAL /HPF
SODIUM SERPL-SCNC: 137 MMOL/L (ref 133–144)
SOURCE: ABNORMAL
SP GR UR STRIP: 1.01 (ref 1–1.03)
UROBILINOGEN UR STRIP-ACNC: 0.2 EU/DL (ref 0.2–1)
WBC # BLD AUTO: 7 10E9/L (ref 4–11)
WBC #/AREA URNS AUTO: ABNORMAL /HPF

## 2019-11-25 PROCEDURE — 81001 URINALYSIS AUTO W/SCOPE: CPT | Performed by: INTERNAL MEDICINE

## 2019-11-25 PROCEDURE — 85025 COMPLETE CBC W/AUTO DIFF WBC: CPT | Performed by: INTERNAL MEDICINE

## 2019-11-25 PROCEDURE — 99203 OFFICE O/P NEW LOW 30 MIN: CPT | Performed by: DERMATOLOGY

## 2019-11-25 PROCEDURE — 84156 ASSAY OF PROTEIN URINE: CPT | Performed by: INTERNAL MEDICINE

## 2019-11-25 PROCEDURE — 36415 COLL VENOUS BLD VENIPUNCTURE: CPT | Performed by: INTERNAL MEDICINE

## 2019-11-25 PROCEDURE — 80053 COMPREHEN METABOLIC PANEL: CPT | Performed by: INTERNAL MEDICINE

## 2019-11-25 ASSESSMENT — PAIN SCALES - GENERAL: PAINLEVEL: NO PAIN (0)

## 2019-11-25 NOTE — NURSING NOTE
Jesica Mooney's goals for this visit include:   Chief Complaint   Patient presents with     Skin Check     areas of concern; moles on face, chest, bilateral buttocks.       She requests these members of her care team be copied on today's visit information: Yes    PCP: Ravi Uriarte, [facility]    Referring Provider:  No referring provider defined for this encounter.    There were no vitals taken for this visit.    Do you need any medication refills at today's visit? FABIAN Sharif LPN

## 2019-11-25 NOTE — LETTER
"    11/25/2019         RE: Jesica Mooney  [address] [facility] [address]        Dear Colleague,    Thank you for referring your patient, Jesica Mooney, to the [facility]. Please see a copy of my visit note below.    [facility] Dermatology Note      Dermatology Problem List:  1. Benign nevi     Encounter Date: Nov 25, 2019    CC:  Chief Complaint   Patient presents with     Skin Check     areas of concern; moles on face, chest, bilateral buttocks.     History of Present Illness:  Ms. Jesica Mooney is a 44 year old female who presents today for a skin exam. This is the patient's first visit in our dermatology clinic. She does not have major lesions of concern. Does note some new lesions on her face. These are asymptomatic. Denies any painful, bleeding, growing, or non-resolving lesions. She had one biopsy done several years ago on the temple area that returned benign. This was performed by a dermatologist at [facility] - no records available for review.  She does not have personal or family history of skin cancer. 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 Has gone every other day for 4 weeks intermittently prior to vacations for \"base tan\". Otherwise in normal state of health. No other skin concerns today.       Past Medical History:   Patient Active Problem List   Diagnosis     BMI 40.0-44.9, adult (H)     CARDIOVASCULAR SCREENING; LDL GOAL LESS THAN 160     Other acute pulmonary embolism     Sjogren's syndrome (H)     Screening for cervical cancer     Past Medical History:   Diagnosis Date     IUD (intrauterine device) in place     Mirena inserted 12/28/15     Past Surgical History:   Procedure Laterality Date     BIOPSY  a fews years ago    A mole was removed from by scalp     C ORAL SURGERY PROCEDURE  10/1990    wisdom teeth extracted       Social History:  Patient reports that she has never smoked. She has never used smokeless " "tobacco. She reports that she does not drink alcohol or use drugs.    Family History:  Family History   Problem Relation Age of Onset     Eye Disorder Mother         cataracts      Rheumatoid Arthritis Mother      Cancer - colorectal Paternal Grandmother         ?     Thyroid Disease Father      Breast Cancer Maternal Aunt         diagnosed in 30s (mother's 1/2 sister)     Cancer No family hx of      Diabetes No family hx of      Hypertension No family hx of      Cerebrovascular Disease No family hx of      Macular Degeneration No family hx of      Glaucoma No family hx of        Medications:  Current Outpatient Medications   Medication Sig Dispense Refill     Doxylamine Succinate, Sleep, (SLEEP AID PO)        levonorgestrel (MIRENA) 20 MCG/24HR IUD 1 each by Intrauterine route once       propylene glycol (SYSTANE BALANCE) 0.6 % SOLN ophthalmic solution Place 1 drop into both eyes 2 times daily \"Systane Balance\" 1 Bottle      XARELTO 20 MG TABS tablet TAKE 1 TABLET(20 MG) BY MOUTH DAILY WITH DINNER 90 tablet 3       Allergies   Allergen Reactions     Keflex [Cephalexin Hcl] Hives     Penicillins Hives       Review of Systems:  -Skin Establ Pt: The patient denies any new rash, pruritus, or lesions that are symptomatic, changing or bleeding, except as per HPI.  -Constitutional: The patient is feeling generally well.    Physical exam:  Vitals: There were no vitals taken for this visit.  GEN: This is a well developed, well-nourished female in no acute distress, in a pleasant mood.    SKIN: Total skin excluding the undergarment areas was performed. The exam included the head/face, neck, both arms, chest, back, buttocks, abdomen, both legs, digits and/or nails.   - Yellow oily papules with central umbilication located on the face.  - Dome shaped bright red papules on the face, trunk, and extremities.   - Waxy stuck on tan to brown papules on the face and back.  - Multiple regular brown pigmented macules and papules are " identified on the trunk and extremities.   - Scattered brown macules on sun exposed areas.  - No other lesions of concern on areas examined.       Impression/Plan:    1. Multiple clinically benign nevi - trunk and extremities   ABCDs of melanoma were discussed and self skin checks were advised.   No further intervention required. Patient to report changes.     2. Solar lentigines - sun exposed areas    Recommended sunscreens SPF #30 or greater, protective clothing and avoidance of tanning beds.    3. Benign lesions: seborrheic keratosis, cherry angiomas, sebaceous hyperplasia  Patient reassured of the benign nature of these lesions.  No further intervention required. Patient to report changes.     Follow-up in 2 years for full body skin examination given history of indoor tanning. Consider PRN appointments after that visit.     Staff Involved:    Scribe Disclosure  I, Jimenez Sears, am serving as a scribe to document services personally performed by Dr. Mac Gee, based on data collection and the provider's statements to me.     Provider Disclosure:   The documentation recorded by the scribe accurately reflects the services I personally performed and the decisions made by me.    Mac Gee MD    Department of Dermatology  [facility]: Phone: [phone], Fax:[phone]  [facility]: Phone: [phone], Fax: [phone]              Again, thank you for allowing me to participate in the care of your patient.        Sincerely,        Mac Gee MD

## 2019-11-25 NOTE — PROGRESS NOTES
"[facility] Dermatology Note      Dermatology Problem List:  1. Benign nevi     Encounter Date: Nov 25, 2019    CC:  Chief Complaint   Patient presents with     Skin Check     areas of concern; moles on face, chest, bilateral buttocks.     History of Present Illness:  Ms. Jesica Mooney is a 44 year old female who presents today for a skin exam. This is the patient's first visit in our dermatology clinic. She does not have major lesions of concern. Does note some new lesions on her face. These are asymptomatic. Denies any painful, bleeding, growing, or non-resolving lesions. She had one biopsy done several years ago on the temple area that returned benign. This was performed by a dermatologist at [facility] - no records available for review.  She does not have personal or family history of skin cancer. 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 Has gone every other day for 4 weeks intermittently prior to vacations for \"base tan\". Otherwise in normal state of health. No other skin concerns today.       Past Medical History:   Patient Active Problem List   Diagnosis     BMI 40.0-44.9, adult (H)     CARDIOVASCULAR SCREENING; LDL GOAL LESS THAN 160     Other acute pulmonary embolism     Sjogren's syndrome (H)     Screening for cervical cancer     Past Medical History:   Diagnosis Date     IUD (intrauterine device) in place     Mirena inserted 12/28/15     Past Surgical History:   Procedure Laterality Date     BIOPSY  a fews years ago    A mole was removed from by scalp     C ORAL SURGERY PROCEDURE  10/1990    wisdom teeth extracted       Social History:  Patient reports that she has never smoked. She has never used smokeless tobacco. She reports that she does not drink alcohol or use drugs.    Family History:  Family History   Problem Relation Age of Onset     Eye Disorder Mother         cataracts      Rheumatoid Arthritis Mother      Cancer - colorectal Paternal Grandmother         " "?     Thyroid Disease Father      Breast Cancer Maternal Aunt         diagnosed in 30s (mother's 1/2 sister)     Cancer No family hx of      Diabetes No family hx of      Hypertension No family hx of      Cerebrovascular Disease No family hx of      Macular Degeneration No family hx of      Glaucoma No family hx of        Medications:  Current Outpatient Medications   Medication Sig Dispense Refill     Doxylamine Succinate, Sleep, (SLEEP AID PO)        levonorgestrel (MIRENA) 20 MCG/24HR IUD 1 each by Intrauterine route once       propylene glycol (SYSTANE BALANCE) 0.6 % SOLN ophthalmic solution Place 1 drop into both eyes 2 times daily \"Systane Balance\" 1 Bottle      XARELTO 20 MG TABS tablet TAKE 1 TABLET(20 MG) BY MOUTH DAILY WITH DINNER 90 tablet 3       Allergies   Allergen Reactions     Keflex [Cephalexin Hcl] Hives     Penicillins Hives       Review of Systems:  -Skin Establ Pt: The patient denies any new rash, pruritus, or lesions that are symptomatic, changing or bleeding, except as per HPI.  -Constitutional: The patient is feeling generally well.    Physical exam:  Vitals: There were no vitals taken for this visit.  GEN: This is a well developed, well-nourished female in no acute distress, in a pleasant mood.    SKIN: Total skin excluding the undergarment areas was performed. The exam included the head/face, neck, both arms, chest, back, buttocks, abdomen, both legs, digits and/or nails.   - Yellow oily papules with central umbilication located on the face.  - Dome shaped bright red papules on the face, trunk, and extremities.   - Waxy stuck on tan to brown papules on the face and back.  - Multiple regular brown pigmented macules and papules are identified on the trunk and extremities.   - Scattered brown macules on sun exposed areas.  - No other lesions of concern on areas examined.       Impression/Plan:    1. Multiple clinically benign nevi - trunk and extremities   ABCDs of melanoma were discussed and " self skin checks were advised.   No further intervention required. Patient to report changes.     2. Solar lentigines - sun exposed areas    Recommended sunscreens SPF #30 or greater, protective clothing and avoidance of tanning beds.    3. Benign lesions: seborrheic keratosis, cherry angiomas, sebaceous hyperplasia  Patient reassured of the benign nature of these lesions.  No further intervention required. Patient to report changes.     Follow-up in 2 years for full body skin examination given history of indoor tanning. Consider PRN appointments after that visit.     Staff Involved:    Scribe Disclosure  I, Jimenez Sears, am serving as a scribe to document services personally performed by Dr. Mac Gee, based on data collection and the provider's statements to me.     Provider Disclosure:   The documentation recorded by the scribe accurately reflects the services I personally performed and the decisions made by me.    Mac Gee MD    Department of Dermatology  [facility]: Phone: [phone], Fax:[phone]  [facility]: Phone: [phone], Fax: [phone]

## 2019-12-02 ENCOUNTER — OFFICE VISIT (OUTPATIENT)
Dept: RHEUMATOLOGY | Facility: CLINIC | Age: 44
End: 2019-12-02
Payer: COMMERCIAL

## 2019-12-02 VITALS
OXYGEN SATURATION: 98 % | SYSTOLIC BLOOD PRESSURE: 118 MMHG | BODY MASS INDEX: 43.98 KG/M2 | HEART RATE: 71 BPM | DIASTOLIC BLOOD PRESSURE: 72 MMHG | WEIGHT: 248.2 LBS | HEIGHT: 63 IN

## 2019-12-02 DIAGNOSIS — M35.01 SJOGREN'S SYNDROME WITH KERATOCONJUNCTIVITIS SICCA (H): Primary | ICD-10-CM

## 2019-12-02 PROCEDURE — 99213 OFFICE O/P EST LOW 20 MIN: CPT | Performed by: INTERNAL MEDICINE

## 2019-12-02 RX ORDER — CEVIMELINE HYDROCHLORIDE 30 MG/1
CAPSULE ORAL
Qty: 90 CAPSULE | Refills: 11 | Status: SHIPPED | OUTPATIENT
Start: 2019-12-02 | End: 2020-12-01

## 2019-12-02 ASSESSMENT — MIFFLIN-ST. JEOR: SCORE: 1739.96

## 2019-12-02 NOTE — PROGRESS NOTES
Rheumatology Clinic Visit      Jesica Mooney MRN# [id number]   YOB: 1975 Age: 44 year old      Date of visit: 12/02/19   Hematology/Oncology: Dr. Flavia Leon and Dr. Ata Rendon  PCP: Brenda Graves    Chief Complaint   Patient presents with:  RECHECK: Sjogrens      Assessment and Plan     1. Sjogren's Syndrome (JAN >1:94594, SSA >8):  She has punctal plugs currently; restasis was reportedly ineffective when she used a sample from her ophthalmologist.  She continues following with her ophthalmologist.  Pilocarpine was tolerated but provided no benefit so she stopped it with no change in symptoms.  We discussed using Evoxac and she would like to try this.  Note that she reports having good dentition and regularly follows with her dentist; no recent cavities.  We reviewed the risks and side effects of Evoxac.   - Start Evoxac 30mg daily x7days, then 30mg twice daily x7days, then 30mg three times daily thereafter.    - Labs in 1 year: CBC, CMP, UA, Uprotein:creatinine    2. Pulmonary Embolism: Diagnosed 12/2015 and is on anticoagulation.  Followed by Dr. Leon (heme/onc) and Dr. Rendon (heme/onc)    3.  Dorsal midfoot pain bilaterally: Degenerative in nature.  Advised stiff soled shoes with good arch support and if no improvement then to consider seeing podiatry.  Also advised weight loss.      Thank you for involving me in the care of the patient    Return to clinic: 1 year, earlier PRN    HPI   Jesica Mooney is a 44 year old female with history of pulmonary embolism and obesity who presents for f/u of Sjogren's Syndrome.     Today, Ms. Mooney reports that pilocarpine provided no benefit so she stopped it and did not notice any change in her symptoms.  Still with dry eyes that are managed by ophthalmology, using eyedrops in the morning and evening.  Drinking water frequently.  Follows with a dentist and has not had a cavity recently.  Bilateral dorsal midfoot pain that is  "worse with increased ambulation and improves with rest. No other joint pain.    Denies fevers, chills, nausea, vomiting, constipation, diarrhea. No abdominal pain. No chest pain/pressure, palpitations, or shortness of breath. No oral or nasal sores. No neck pain. No rash.      Tobacco: None  EtOH: None  Drugs: None    ROS   GEN: No fevers, chills, or night sweats. No weight loss.   SKIN: No itching, rashes, sores  HEENT:  No oral or nasal ulcers.  CV: No chest pain, pressure, palpitations, or dyspnea on exertion.  PULM: No SOB, wheeze, cough.  GI: No nausea, vomiting, constipation, diarrhea. No blood in stool. No abdominal pain.  : No blood in urine.  MSK: See HPI.  NEURO: No numbness, tingling, or weakness.  EXT: No LE swelling    Active Problem List     Patient Active Problem List   Diagnosis     BMI 40.0-44.9, adult (H)     CARDIOVASCULAR SCREENING; LDL GOAL LESS THAN 160     Other acute pulmonary embolism     Sjogren's syndrome (H)     Screening for cervical cancer     Past Medical History     Past Medical History:   Diagnosis Date     IUD (intrauterine device) in place     Mirena inserted 12/28/15     Past Surgical History     Past Surgical History:   Procedure Laterality Date     BIOPSY  a fews years ago    A mole was removed from by scalp     C ORAL SURGERY PROCEDURE  10/1990    wisdom teeth extracted     Allergy     Allergies   Allergen Reactions     Keflex [Cephalexin Hcl] Hives     Penicillins Hives     Current Medication List     Current Outpatient Medications   Medication Sig     Doxylamine Succinate, Sleep, (SLEEP AID PO)      levonorgestrel (MIRENA) 20 MCG/24HR IUD 1 each by Intrauterine route once     propylene glycol (SYSTANE BALANCE) 0.6 % SOLN ophthalmic solution Place 1 drop into both eyes 2 times daily \"Systane Balance\"     XARELTO 20 MG TABS tablet TAKE 1 TABLET(20 MG) BY MOUTH DAILY WITH DINNER     No current facility-administered medications for this visit.          Social History   See " "HPI    Family History     Family History   Problem Relation Age of Onset     Eye Disorder Mother         cataracts      Rheumatoid Arthritis Mother      Cancer - colorectal Paternal Grandmother         ?     Thyroid Disease Father      Breast Cancer Maternal Aunt         diagnosed in 30s (mother's 1/2 sister)     Cancer No family hx of      Diabetes No family hx of      Hypertension No family hx of      Cerebrovascular Disease No family hx of      Macular Degeneration No family hx of      Glaucoma No family hx of      Mother: RA    Physical Exam     Temp Readings from Last 3 Encounters:   09/16/19 98.6  F (37  C) (Oral)   03/12/19 98.4  F (36.9  C) (Oral)   12/03/18 99.8  F (37.7  C) (Oral)     BP Readings from Last 5 Encounters:   09/16/19 122/82   03/12/19 128/88   12/03/18 118/78   12/03/18 118/78   11/26/18 124/86     Pulse Readings from Last 1 Encounters:   09/16/19 74     Resp Readings from Last 1 Encounters:   03/12/19 12     Estimated body mass index is 43.75 kg/m  as calculated from the following:    Height as of 3/12/19: 1.6 m (5' 3\").    Weight as of 9/16/19: 112 kg (247 lb).    GEN: NAD  HEENT: Dry mucous membranes. No oral lesions. Anicteric, noninjected sclera. Eyes appear to have good moisture by gross examination.  CV: S1, S2. RRR. No m/r/g.  PULM: CTA bilaterally. No w/c.  MSK:  MCPs, PIPs, DIPs, wrists, elbows, shoulders, knees, and ankles without swelling or tenderness to palpation.  Negative MCP and MTP squeeze.   Hips nontender to palpation  NEURO: UE and LE strengths 5/5 and equal bilaterally.   SKIN: No rash. Many moles on the bilateral forearms  LYMPH: No head/neck lymphadenopathy appreciated   EXT: No LE edema  PSYCH: Alert. Appropriate.    Labs / Imaging (select studies)     CBC  Recent Labs   Lab Test 11/25/19  0837 11/26/18  0830 11/27/17  0846   WBC 7.0 7.3 5.9   RBC 5.13 4.91 4.99   HGB 15.7 15.1 15.2   HCT 46.9 44.4 45.0   MCV 91 90 90   RDW 12.8 12.6 12.5    246 243   MCH 30.6 " 30.8 30.5   MCHC 33.5 34.0 33.8   NEUTROPHIL 47.9 44.9 44.5   LYMPH 33.7 39.3 38.1   MONOCYTE 9.7 9.4 8.8   EOSINOPHIL 8.3 6.1 8.1   BASOPHIL 0.4 0.3 0.5   ANEU 3.3 3.3 2.6   ALYM 2.4 2.9 2.3   CARLY 0.7 0.7 0.5   AEOS 0.6 0.4 0.5   ABAS 0.0 0.0 0.0     CMP  Recent Labs   Lab Test 11/25/19  0837 11/26/18  0830 11/27/17  0846    138 137   POTASSIUM 4.2 4.1 4.0   CHLORIDE 108 106 105   CO2 28 27 29   ANIONGAP 1* 5 3   GLC 98 96 88   BUN 13 14 14   CR 0.78 0.81 0.90   GFRESTIMATED >90 77 68   GFRESTBLACK >90 >90 82   BROOKS 9.6 9.3 9.2   BILITOTAL 0.4 0.3 0.4   ALBUMIN 3.6 3.6 3.8   PROTTOTAL 7.9 8.1 8.5   ALKPHOS 61 63 65   AST 22 25 26   ALT 36 35 42     Calcium/VitaminD  Recent Labs   Lab Test 11/25/19  0837 11/26/18  0830 11/27/17  0846   BROOKS 9.6 9.3 9.2     ESR/CRP  Recent Labs   Lab Test 11/27/17  0846 11/25/16  1512 05/24/16  0822   SED 15 23* 17   CRP <2.9 <2.9 <2.9     Immunization History     Immunization History   Administered Date(s) Administered     Influenza (IIV3) PF 10/02/2012     Influenza Intranasal Vaccine 4 valent 10/11/2013, 10/16/2014, 10/11/2016     Influenza Vaccine IM > 6 months Valent IIV4 10/05/2015, 10/13/2017, 10/15/2018, 09/16/2019     Pneumo Conj 13-V (2010&after) 03/12/2019     Pneumococcal 23 valent 09/03/2019     TDAP Vaccine (Adacel) 12/03/2018     Tdap (Adacel,Boostrix) 01/17/2008     Twinrix A/B 12/17/2012, 01/16/2013, 06/18/2013     Zoster vaccine recombinant adjuvanted (SHINGRIX) 03/12/2019, 09/03/2019          Chart documentation done in part with Dragon Voice recognition Software. Although reviewed after completion, some word and grammatical error may remain.    Dylan Donato MD

## 2019-12-02 NOTE — PATIENT INSTRUCTIONS
Rheumatology    Dr. Dylan Donato       M [facility] [place]   (Monday)  [address]  [place] [address]       M [facility] [place]   (Tuesday)  [address] [place] [address]    [facility] [place]   (Wed., Thurs., and Friday)  [address] [place] [address]    Phone number: [phone]  Thank you for choosing [facility].  Brittanie Rouse CMA

## 2019-12-02 NOTE — NURSING NOTE
RAPID3 (0-30) Cumulative Score  1.0          RAPID3 Weighted Score (divide #4 by 3 and that is the weighted score)  0.33

## 2020-03-02 ENCOUNTER — HEALTH MAINTENANCE LETTER (OUTPATIENT)
Age: 45
End: 2020-03-02

## 2020-03-13 ENCOUNTER — TELEPHONE (OUTPATIENT)
Dept: HEMATOLOGY | Facility: CLINIC | Age: 45
End: 2020-03-13

## 2020-03-16 ENCOUNTER — VIRTUAL VISIT (OUTPATIENT)
Dept: HEMATOLOGY | Facility: CLINIC | Age: 45
End: 2020-03-16
Attending: PHYSICIAN ASSISTANT
Payer: COMMERCIAL

## 2020-03-16 DIAGNOSIS — Z86.711 HISTORY OF PULMONARY EMBOLISM: Primary | ICD-10-CM

## 2020-03-16 DIAGNOSIS — D68.59 ANTITHROMBIN DEFICIENCY (H): ICD-10-CM

## 2020-03-16 DIAGNOSIS — Z79.01 CHRONIC ANTICOAGULATION: ICD-10-CM

## 2020-03-16 PROCEDURE — 40000269 ZZH STATISTIC NO CHARGE FACILITY FEE

## 2020-03-16 NOTE — PROGRESS NOTES
"    [facility]  [address] [place] [address] [address], [place] [address]  Main: [phone], Fax: [phone]    Jesica Mooney is a 44 year old female who is being evaluated via a billable telephone visit.      The patient has been notified of following:     \"This telephone visit will be conducted via a call between you and your physician/provider. We have found that certain health care needs can be provided without the need for a physical exam.  This service lets us provide the care you need with a short phone conversation.  If a prescription is necessary we can send it directly to your pharmacy.  If lab work is needed we can place an order for that and you can then stop by our lab to have the test done at a later time.    If during the course of the call the physician/provider feels a telephone visit is not appropriate, you will not be charged for this service.\"       Phone call contact time  Call Started at 12:30pm  Call Ended at 12:45pm        Outpatient Visit Note:    Patient: Jesica Mooney  MRN: [id number]  : 1975  OWEN: 3/16/2020    Reason:  History of pulmonary embolism. History of antithrombin deficiency. On chronic anticoagulation therapy. Annual annual follow up visit for long term anticoagulation.    HPI:  This is a 44-year-old female without any significant past medical history who in Dec 2015 presented to an outside hospital with pleuritic chest pain and was found to have a left lower lobe pulmonary embolism.  At that time, she was using estrogen-containing oral contraceptive pills.  Otherwise, prior to that episode she was feeling fine without any recent illnesses or long travel or any other provoking factors.  A CTA showed acute pulmonary emboli in the lower lobe and segmental and subsegmental pulmonary arteries.  She was started on Xarelto and oral contraceptive pills were discontinued.  At that time there was no hypercoagulable workup performed, " but later on she saw Dr. Leon at [facility] who did a hypercoagulable workup which showed a very high-titer positive JAN, persistently elevated D-dimer around 2.5, and persistently slightly low levels of antithrombin III in the 70s.  Her lupus inhibitor, cardiolipin antibodies, beta-2 glycoprotein antibodies, protein C and protein S levels, and factor II and factor V Leiden mutations were negative.  She was subsequently referred to Rheumatology for high positive JAN titer who diagnosed her with Sjogren syndrome.  She continues to take Xarelto without any problems.  She had an ultrasound of the lower extremities, not at the time of presentation for PE, but in May 2016 which was unremarkable and did not show any DVT.      The patient underwent initial consultation with Dr. Ata Rendon back in June 14th 2016. At the time, Dr. Rendon recommended that she should maintain on anticoagulation while undergo gene sequencing for antithrombin deficiency. 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 However, Dr. Rendon felt that this likely is a pathologic gene mutation as this mutation has been identified in some individuals with a history of clotting. Thus it was recommended that Jesica is to remain on indefinite anticoagulation therapy.    Interim History:  Jesica reports that in the past 4 years, she has remained on Xarelto at 20 mg PO Qday dosing and has been tolerating it well. She rarely miss any doses. She denies any significant bleeding complications while on the medications. In fact, she reports that she does not even felt like she is taking the medication. She has an IUD placed shortly after her pulmonary embolic event back in 2015 without any current issues of heavy menstrual bleeding.  She is now due to have her IUD replaced.    She denies any issues with frequent epistaxis, no gum bleeding. Denies any hematuria or blood in stools.  Denies any frequent bruising.     Medications:  Current Outpatient Medications   Medication     cevimeline (EVOXAC) 30 MG capsule     Doxylamine Succinate, Sleep, (SLEEP AID PO)     levonorgestrel (MIRENA) 20 MCG/24HR IUD     propylene glycol (SYSTANE BALANCE) 0.6 % SOLN ophthalmic solution     XARELTO 20 MG TABS tablet     No current facility-administered medications for this visit.      Allergies:  Allergies   Allergen Reactions     Keflex [Cephalexin Hcl] Hives     Penicillins Hives     PmHx:  Past Medical History:   Diagnosis Date     IUD (intrauterine device) in place     Mirena inserted 12/28/15     Social History and Family History:  Deferred.    ROS:  As above. Denies any shortness of breath. No chest pain.    Objective:  Phone visit  There were no vitals taken for this visit.  Exam:  Complete exam is not performed today.    Labs:  Cr 0.78. 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 In general, Jesica is doing very well with Xarelto without any issues with bleeding complications or any recurrent venous thromboembolic events. She is very content with this regimen.     Diagnosis:  1. History of pulmonary embolism - Dec 2015.   2. Antithrombin deficiency.  3. 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     Plan:  No change in regard to her current anticoagulation therapy with Xarelto at 20 mg PO Qday dosing.     She will call if she  has any unusual bleeding issues or if she needs any invasive or surgical procedures.  She will have IUD replacement this  year.  If they request she hold anticoagulation - she will hold the day before procedure. She will call  our with any questions and we can provide anticoagulation management recommendation surrounding procedures.    We will plan on seeing her back annually.  Xarelto Rx refilled today.       Total Time Spent:  15 minutes, all 15 minutes was spent on face-to-face consultation with the patient and coordination of care in regard to her history of pulmonary embolism and antithrombin deficiency as well as anticoagulation therapy management.          Lesly Baxter MPH, PA-C  [facility]  [facility]  [phone] main line  [phone] pager  [phone] fax

## 2020-10-28 ENCOUNTER — TELEPHONE (OUTPATIENT)
Dept: OBGYN | Facility: CLINIC | Age: 45
End: 2020-10-28

## 2020-10-28 NOTE — TELEPHONE ENCOUNTER
Closing this encounter as it looks like pt has read the [employer] message that was sent answering her question.    Daphney Quintana RN

## 2020-10-28 NOTE — TELEPHONE ENCOUNTER
Unable to reach patient via phone. Left message to call clinic back at [phone] and ask for Women's Health.    Will also send pt a [facility] message.    Daphney Quintana RN     Body Location Override (Optional - Billing Will Still Be Based On Selected Body Map Location If Applicable): right upper arm2 Biopsy Photograph Reviewed: Yes Size Of Lesion In Cm: 3.6 X Size Of Lesion In Cm (Optional): 1.1 Size Of Margin In Cm: 0.3 Excision Method: Elliptical Anesthesia Volume In Cc: 3 Did You Provide Opioid Counseling: No Repair Type: Intermediate Suturegard Retention Suture: 2-0 Nylon Retention Suture Bite Size: 3 mm Length To Time In Minutes Device Was In Place: 10 Number Of Hemigard Strips Per Side: 1 Undermining Type: Entire Wound Debridement Text: The wound edges were debrided prior to proceeding with the closure to facilitate wound healing. Helical Rim Text: The closure involved the helical rim. Vermilion Border Text: The closure involved the vermilion border. Nostril Rim Text: The closure involved the nostril rim. Retention Suture Text: Retention sutures were placed to support the closure and prevent dehiscence. Primary Defect Length (In Cm): 0 Suture Removal: 14 days Lab: [facility] Lab Facility: [address] [facility] Graft Donor Site Bandage (Optional-Leave Blank If You Don't Want In Note): Steri-strips and a pressure bandage were applied to the donor site. Epidermal Closure Graft Donor Site (Optional): simple interrupted Billing Type: [employer] Excision Depth: adipose tissue Scalpel Size: 15 blade Anesthesia Type: 2% lidocaine with epinephrine Additional Anesthesia Volume In Cc: 6 Hemostasis: Pressure Estimated Blood Loss (Cc): minimal Detail Level: Detailed Deep Sutures: 3-0 Vicryl Epidermal Sutures: 4-0 Ethilon Epidermal Closure: running Wound Care: Vaseline Dressing: pressure dressing Suturegard Intro: Intraoperative tissue expansion was performed, utilizing the SUTUREGARD device, in order to reduce wound tension. Suturegard Body: The suture ends were repeatedly re-tightened and re-clamped to achieve the desired tissue expansion. Hemigard Intro: Due to skin fragility and wound tension, it was decided to use HEMIGARD adhesive retention suture devices to permit a linear closure. The skin was cleaned and dried for a 6cm distance away from the wound. Excessive hair, if present, was removed to allow for adhesion. Hemigard Postcare Instructions: The HEMIGARD strips are to remain completely dry for at least 5-7 days. Positioning (Leave Blank If You Do Not Want): The patient was placed in a comfortable position exposing the surgical site. Pre-Excision Curettage Text (Leave Blank If You Do Not Want): Prior to drawing the surgical margin the visible lesion was removed with electrodesiccation and curettage to clearly define the lesion size. Complex Repair Preamble Text (Leave Blank If You Do Not Want): Extensive wide undermining was performed. Intermediate Repair Preamble Text (Leave Blank If You Do Not Want): Undermining was performed with blunt dissection. Curvilinear Excision Additional Text (Leave Blank If You Do Not Want): The margin was drawn around the clinically apparent lesion. A curvilinear shape was then drawn on the skin incorporating the lesion and margins. Incisions were then made along these lines to the appropriate tissue plane and the lesion was extirpated. Fusiform Excision Additional Text (Leave Blank If You Do Not Want): The margin was drawn around the clinically apparent lesion. A fusiform shape was then drawn on the skin incorporating the lesion and margins. Incisions were then made along these lines to the appropriate tissue plane and the lesion was extirpated. Elliptical Excision Additional Text (Leave Blank If You Do Not Want): The margin was drawn around the clinically apparent lesion. An elliptical shape was then drawn on the skin incorporating the lesion and margins. Incisions were then made along these lines to the appropriate tissue plane and the lesion was extirpated. Saucerization Excision Additional Text (Leave Blank If You Do Not Want): The margin was drawn around the clinically apparent lesion. Incisions were then made along these lines, in a tangential fashion, to the appropriate tissue plane and the lesion was extirpated. Slit Excision Additional Text (Leave Blank If You Do Not Want): A linear line was drawn on the skin overlying the lesion. An incision was made slowly until the lesion was visualized. Once visualized, the lesion was removed with blunt dissection. Excisional Biopsy Additional Text (Leave Blank If You Do Not Want): The margin was drawn around the clinically apparent lesion. An elliptical shape was then drawn on the skin incorporating the lesion and margins.  Incisions were then made along these lines to the appropriate tissue plane and the lesion was extirpated. Perilesional Excision Additional Text (Leave Blank If You Do Not Want): The margin was drawn around the clinically apparent lesion. Incisions were then made along these lines to the appropriate tissue plane and the lesion was extirpated. Repair Performed By Another Provider Text (Leave Blank If You Do Not Want): After the tissue was excised the defect was repaired by another provider. No Repair - Repaired With Adjacent Surgical Defect Text (Leave Blank If You Do Not Want): After the excision the defect was repaired concurrently with another surgical defect which was in close approximation. Advancement Flap (Single) Text: The defect edges were debeveled with a #15 scalpel blade. Given the location of the defect and the proximity to free margins a single advancement flap was deemed most appropriate. Using a sterile surgical marker, an appropriate advancement flap was drawn incorporating the defect and placing the expected incisions within the relaxed skin tension lines where possible. The area thus outlined was incised deep to adipose tissue with a #15 scalpel blade. The skin margins were undermined to an appropriate distance in all directions utilizing iris scissors. Advancement Flap (Double) Text: The defect edges were debeveled with a #15 scalpel blade. Given the location of the defect and the proximity to free margins a double advancement flap was deemed most appropriate. Using a sterile surgical marker, the appropriate advancement flaps were drawn incorporating the defect and placing the expected incisions within the relaxed skin tension lines where possible. The area thus outlined was incised deep to adipose tissue with a #15 scalpel blade. The skin margins were undermined to an appropriate distance in all directions utilizing iris scissors. Burow's Advancement Flap Text: The defect edges were debeveled with a #15 scalpel blade. Given the location of the defect and the proximity to free margins a Burow's advancement flap was deemed most appropriate. Using a sterile surgical marker, the appropriate advancement flap was drawn incorporating the defect and placing the expected incisions within the relaxed skin tension lines where possible. The area thus outlined was incised deep to adipose tissue with a #15 scalpel blade. The skin margins were undermined to an appropriate distance in all directions utilizing iris scissors. Chonodrocutaneous Helical Advancement Flap Text: The defect edges were debeveled with a #15 scalpel blade. Given the location of the defect and the proximity to free margins a chondrocutaneous helical advancement flap was deemed most appropriate. Using a sterile surgical marker, the appropriate advancement flap was drawn incorporating the defect and placing the expected incisions within the relaxed skin tension lines where possible. The area thus outlined was incised deep to adipose tissue with a #15 scalpel blade. The skin margins were undermined to an appropriate distance in all directions utilizing iris scissors. Crescentic Advancement Flap Text: The defect edges were debeveled with a #15 scalpel blade. Given the location of the defect and the proximity to free margins a crescentic advancement flap was deemed most appropriate. Using a sterile surgical marker, the appropriate advancement flap was drawn incorporating the defect and placing the expected incisions within the relaxed skin tension lines where possible. The area thus outlined was incised deep to adipose tissue with a #15 scalpel blade. The skin margins were undermined to an appropriate distance in all directions utilizing iris scissors. A-T Advancement Flap Text: The defect edges were debeveled with a #15 scalpel blade. Given the location of the defect, shape of the defect and the proximity to free margins an A-T advancement flap was deemed most appropriate. Using a sterile surgical marker, an appropriate advancement flap was drawn incorporating the defect and placing the expected incisions within the relaxed skin tension lines where possible. The area thus outlined was incised deep to adipose tissue with a #15 scalpel blade. The skin margins were undermined to an appropriate distance in all directions utilizing iris scissors. O-T Advancement Flap Text: The defect edges were debeveled with a #15 scalpel blade. Given the location of the defect, shape of the defect and the proximity to free margins an O-T advancement flap was deemed most appropriate. Using a sterile surgical marker, an appropriate advancement flap was drawn incorporating the defect and placing the expected incisions within the relaxed skin tension lines where possible. The area thus outlined was incised deep to adipose tissue with a #15 scalpel blade. The skin margins were undermined to an appropriate distance in all directions utilizing iris scissors. O-L Flap Text: The defect edges were debeveled with a #15 scalpel blade. Given the location of the defect, shape of the defect and the proximity to free margins an O-L flap was deemed most appropriate. Using a sterile surgical marker, an appropriate advancement flap was drawn incorporating the defect and placing the expected incisions within the relaxed skin tension lines where possible. The area thus outlined was incised deep to adipose tissue with a #15 scalpel blade. The skin margins were undermined to an appropriate distance in all directions utilizing iris scissors. O-Z Flap Text: The defect edges were debeveled with a #15 scalpel blade. Given the location of the defect, shape of the defect and the proximity to free margins an O-Z flap was deemed most appropriate. Using a sterile surgical marker, an appropriate transposition flap was drawn incorporating the defect and placing the expected incisions within the relaxed skin tension lines where possible. The area thus outlined was incised deep to adipose tissue with a #15 scalpel blade. The skin margins were undermined to an appropriate distance in all directions utilizing iris scissors. Double O-Z Flap Text: The defect edges were debeveled with a #15 scalpel blade. Given the location of the defect, shape of the defect and the proximity to free margins a Double O-Z flap was deemed most appropriate. Using a sterile surgical marker, an appropriate transposition flap was drawn incorporating the defect and placing the expected incisions within the relaxed skin tension lines where possible. The area thus outlined was incised deep to adipose tissue with a #15 scalpel blade. The skin margins were undermined to an appropriate distance in all directions utilizing iris scissors. V-Y Flap Text: The defect edges were debeveled with a #15 scalpel blade. Given the location of the defect, shape of the defect and the proximity to free margins a V-Y flap was deemed most appropriate. Using a sterile surgical marker, an appropriate advancement flap was drawn incorporating the defect and placing the expected incisions within the relaxed skin tension lines where possible. The area thus outlined was incised deep to adipose tissue with a #15 scalpel blade. The skin margins were undermined to an appropriate distance in all directions utilizing iris scissors. Advancement-Rotation Flap Text: The defect edges were debeveled with a #15 scalpel blade. Given the location of the defect, shape of the defect and the proximity to free margins an advancement-rotation flap was deemed most appropriate. Using a sterile surgical marker, an appropriate flap was drawn incorporating the defect and placing the expected incisions within the relaxed skin tension lines where possible. The area thus outlined was incised deep to adipose tissue with a #15 scalpel blade. The skin margins were undermined to an appropriate distance in all directions utilizing iris scissors. Mercedes Flap Text: The defect edges were debeveled with a #15 scalpel blade. Given the location of the defect, shape of the defect and the proximity to free margins a Mercedes flap was deemed most appropriate. Using a sterile surgical marker, an appropriate advancement flap was drawn incorporating the defect and placing the expected incisions within the relaxed skin tension lines where possible. The area thus outlined was incised deep to adipose tissue with a #15 scalpel blade. The skin margins were undermined to an appropriate distance in all directions utilizing iris scissors. Modified Advancement Flap Text: The defect edges were debeveled with a #15 scalpel blade. Given the location of the defect, shape of the defect and the proximity to free margins a modified advancement flap was deemed most appropriate. Using a sterile surgical marker, an appropriate advancement flap was drawn incorporating the defect and placing the expected incisions within the relaxed skin tension lines where possible. The area thus outlined was incised deep to adipose tissue with a #15 scalpel blade. The skin margins were undermined to an appropriate distance in all directions utilizing iris scissors. Mucosal Advancement Flap Text: Given the location of the defect, shape of the defect and the proximity to free margins a mucosal advancement flap was deemed most appropriate. Incisions were made with a 15 blade scalpel in the appropriate fashion along the cutaneous vermilion border and the mucosal lip. The remaining actinically damaged mucosal tissue was excised. The mucosal advancement flap was then elevated to the gingival sulcus with care taken to preserve the neurovascular structures and advanced into the primary defect. Care was taken to ensure that precise realignment of the vermilion border was achieved. Peng Advancement Flap Text: The defect edges were debeveled with a #15 scalpel blade. Given the location of the defect, shape of the defect and the proximity to free margins a Peng advancement flap was deemed most appropriate. Using a sterile surgical marker, an appropriate advancement flap was drawn incorporating the defect and placing the expected incisions within the relaxed skin tension lines where possible. The area thus outlined was incised deep to adipose tissue with a #15 scalpel blade. The skin margins were undermined to an appropriate distance in all directions utilizing iris scissors. Hatchet Flap Text: The defect edges were debeveled with a #15 scalpel blade. Given the location of the defect, shape of the defect and the proximity to free margins a hatchet flap was deemed most appropriate. Using a sterile surgical marker, an appropriate hatchet flap was drawn incorporating the defect and placing the expected incisions within the relaxed skin tension lines where possible. The area thus outlined was incised deep to adipose tissue with a #15 scalpel blade. The skin margins were undermined to an appropriate distance in all directions utilizing iris scissors. Rotation Flap Text: The defect edges were debeveled with a #15 scalpel blade. Given the location of the defect, shape of the defect and the proximity to free margins a rotation flap was deemed most appropriate. Using a sterile surgical marker, an appropriate rotation flap was drawn incorporating the defect and placing the expected incisions within the relaxed skin tension lines where possible. The area thus outlined was incised deep to adipose tissue with a #15 scalpel blade. The skin margins were undermined to an appropriate distance in all directions utilizing iris scissors. Spiral Flap Text: The defect edges were debeveled with a #15 scalpel blade. Given the location of the defect, shape of the defect and the proximity to free margins a spiral flap was deemed most appropriate. Using a sterile surgical marker, an appropriate rotation flap was drawn incorporating the defect and placing the expected incisions within the relaxed skin tension lines where possible. The area thus outlined was incised deep to adipose tissue with a #15 scalpel blade. The skin margins were undermined to an appropriate distance in all directions utilizing iris scissors. Star Wedge Flap Text: The defect edges were debeveled with a #15 scalpel blade. Given the location of the defect, shape of the defect and the proximity to free margins a star wedge flap was deemed most appropriate. Using a sterile surgical marker, an appropriate rotation flap was drawn incorporating the defect and placing the expected incisions within the relaxed skin tension lines where possible. The area thus outlined was incised deep to adipose tissue with a #15 scalpel blade. The skin margins were undermined to an appropriate distance in all directions utilizing iris scissors. Transposition Flap Text: The defect edges were debeveled with a #15 scalpel blade. Given the location of the defect and the proximity to free margins a transposition flap was deemed most appropriate. Using a sterile surgical marker, an appropriate transposition flap was drawn incorporating the defect. The area thus outlined was incised deep to adipose tissue with a #15 scalpel blade. The skin margins were undermined to an appropriate distance in all directions utilizing iris scissors. Muscle Hinge Flap Text: The defect edges were debeveled with a #15 scalpel blade. Given the size, depth and location of the defect and the proximity to free margins a muscle hinge flap was deemed most appropriate. Using a sterile surgical marker, an appropriate hinge flap was drawn incorporating the defect. The area thus outlined was incised with a #15 scalpel blade. The skin margins were undermined to an appropriate distance in all directions utilizing iris scissors. Melolabial Transposition Flap Text: The defect edges were debeveled with a #15 scalpel blade. Given the location of the defect and the proximity to free margins a melolabial flap was deemed most appropriate. Using a sterile surgical marker, an appropriate melolabial transposition flap was drawn incorporating the defect. The area thus outlined was incised deep to adipose tissue with a #15 scalpel blade. The skin margins were undermined to an appropriate distance in all directions utilizing iris scissors. Rhombic Flap Text: The defect edges were debeveled with a #15 scalpel blade. Given the location of the defect and the proximity to free margins a rhombic flap was deemed most appropriate. Using a sterile surgical marker, an appropriate rhombic flap was drawn incorporating the defect. The area thus outlined was incised deep to adipose tissue with a #15 scalpel blade. The skin margins were undermined to an appropriate distance in all directions utilizing iris scissors. Rhomboid Transposition Flap Text: The defect edges were debeveled with a #15 scalpel blade. Given the location of the defect and the proximity to free margins a rhomboid transposition flap was deemed most appropriate. Using a sterile surgical marker, an appropriate rhomboid flap was drawn incorporating the defect. The area thus outlined was incised deep to adipose tissue with a #15 scalpel blade. The skin margins were undermined to an appropriate distance in all directions utilizing iris scissors. Bi-Rhombic Flap Text: The defect edges were debeveled with a #15 scalpel blade. Given the location of the defect and the proximity to free margins a bi-rhombic flap was deemed most appropriate. Using a sterile surgical marker, an appropriate rhombic flap was drawn incorporating the defect. The area thus outlined was incised deep to adipose tissue with a #15 scalpel blade. The skin margins were undermined to an appropriate distance in all directions utilizing iris scissors. Helical Rim Advancement Flap Text: The defect edges were debeveled with a #15 blade scalpel. Given the location of the defect and the proximity to free margins (helical rim) a double helical rim advancement flap was deemed most appropriate. Using a sterile surgical marker, the appropriate advancement flaps were drawn incorporating the defect and placing the expected incisions between the helical rim and antihelix where possible. The area thus outlined was incised through and through with a #15 scalpel blade. With a skin hook and iris scissors, the flaps were gently and sharply undermined and freed up. Bilateral Helical Rim Advancement Flap Text: The defect edges were debeveled with a #15 blade scalpel. Given the location of the defect and the proximity to free margins (helical rim) a bilateral helical rim advancement flap was deemed most appropriate. Using a sterile surgical marker, the appropriate advancement flaps were drawn incorporating the defect and placing the expected incisions between the helical rim and antihelix where possible. The area thus outlined was incised through and through with a #15 scalpel blade. With a skin hook and iris scissors, the flaps were gently and sharply undermined and freed up. Ear Star Wedge Flap Text: The defect edges were debeveled with a #15 blade scalpel. Given the location of the defect and the proximity to free margins (helical rim) an ear star wedge flap was deemed most appropriate. Using a sterile surgical marker, the appropriate flap was drawn incorporating the defect and placing the expected incisions between the helical rim and antihelix where possible. The area thus outlined was incised through and through with a #15 scalpel blade. Banner Transposition Flap Text: The defect edges were debeveled with a #15 scalpel blade. Given the location of the defect and the proximity to free margins a Banner transposition flap was deemed most appropriate. Using a sterile surgical marker, an appropriate flap drawn around the defect. The area thus outlined was incised deep to adipose tissue with a #15 scalpel blade. The skin margins were undermined to an appropriate distance in all directions utilizing iris scissors. Bilobed Flap Text: The defect edges were debeveled with a #15 scalpel blade. Given the location of the defect and the proximity to free margins a bilobe flap was deemed most appropriate. Using a sterile surgical marker, an appropriate bilobe flap drawn around the defect. The area thus outlined was incised deep to adipose tissue with a #15 scalpel blade. The skin margins were undermined to an appropriate distance in all directions utilizing iris scissors. Bilobed Transposition Flap Text: The defect edges were debeveled with a #15 scalpel blade. Given the location of the defect and the proximity to free margins a bilobed transposition flap was deemed most appropriate. Using a sterile surgical marker, an appropriate bilobe flap drawn around the defect. The area thus outlined was incised deep to adipose tissue with a #15 scalpel blade. The skin margins were undermined to an appropriate distance in all directions utilizing iris scissors. Trilobed Flap Text: The defect edges were debeveled with a #15 scalpel blade. Given the location of the defect and the proximity to free margins a trilobed flap was deemed most appropriate. Using a sterile surgical marker, an appropriate trilobed flap drawn around the defect. The area thus outlined was incised deep to adipose tissue with a #15 scalpel blade. The skin margins were undermined to an appropriate distance in all directions utilizing iris scissors. Dorsal Nasal Flap Text: The defect edges were debeveled with a #15 scalpel blade. Given the location of the defect and the proximity to free margins a dorsal nasal flap was deemed most appropriate. Using a sterile surgical marker, an appropriate dorsal nasal flap was drawn around the defect. The area thus outlined was incised deep to adipose tissue with a #15 scalpel blade. The skin margins were undermined to an appropriate distance in all directions utilizing iris scissors. Island Pedicle Flap Text: The defect edges were debeveled with a #15 scalpel blade. Given the location of the defect, shape of the defect and the proximity to free margins an island pedicle advancement flap was deemed most appropriate. Using a sterile surgical marker, an appropriate advancement flap was drawn incorporating the defect, outlining the appropriate donor tissue and placing the expected incisions within the relaxed skin tension lines where possible. The area thus outlined was incised deep to adipose tissue with a #15 scalpel blade. The skin margins were undermined to an appropriate distance in all directions around the primary defect and laterally outward around the island pedicle utilizing iris scissors. There was minimal undermining beneath the pedicle flap. Island Pedicle Flap With Canthal Suspension Text: The defect edges were debeveled with a #15 scalpel blade. Given the location of the defect, shape of the defect and the proximity to free margins an island pedicle advancement flap was deemed most appropriate. Using a sterile surgical marker, an appropriate advancement flap was drawn incorporating the defect, outlining the appropriate donor tissue and placing the expected incisions within the relaxed skin tension lines where possible. The area thus outlined was incised deep to adipose tissue with a #15 scalpel blade. The skin margins were undermined to an appropriate distance in all directions around the primary defect and laterally outward around the island pedicle utilizing iris scissors. There was minimal undermining beneath the pedicle flap. A suspension suture was placed in the canthal tendon to prevent tension and prevent ectropion. Alar Island Pedicle Flap Text: The defect edges were debeveled with a #15 scalpel blade. Given the location of the defect, shape of the defect and the proximity to the alar rim an island pedicle advancement flap was deemed most appropriate. Using a sterile surgical marker, an appropriate advancement flap was drawn incorporating the defect, outlining the appropriate donor tissue and placing the expected incisions within the nasal ala running parallel to the alar rim. The area thus outlined was incised with a #15 scalpel blade. The skin margins were undermined minimally to an appropriate distance in all directions around the primary defect and laterally outward around the island pedicle utilizing iris scissors. There was minimal undermining beneath the pedicle flap. Double Island Pedicle Flap Text: The defect edges were debeveled with a #15 scalpel blade. Given the location of the defect, shape of the defect and the proximity to free margins a double island pedicle advancement flap was deemed most appropriate. Using a sterile surgical marker, an appropriate advancement flap was drawn incorporating the defect, outlining the appropriate donor tissue and placing the expected incisions within the relaxed skin tension lines where possible. The area thus outlined was incised deep to adipose tissue with a #15 scalpel blade. The skin margins were undermined to an appropriate distance in all directions around the primary defect and laterally outward around the island pedicle utilizing iris scissors. There was minimal undermining beneath the pedicle flap. Island Pedicle Flap-Requiring Vessel Identification Text: The defect edges were debeveled with a #15 scalpel blade. Given the location of the defect, shape of the defect and the proximity to free margins an island pedicle advancement flap was deemed most appropriate. Using a sterile surgical marker, an appropriate advancement flap was drawn, based on the axial vessel mentioned above, incorporating the defect, outlining the appropriate donor tissue and placing the expected incisions within the relaxed skin tension lines where possible. The area thus outlined was incised deep to adipose tissue with a #15 scalpel blade. The skin margins were undermined to an appropriate distance in all directions around the primary defect and laterally outward around the island pedicle utilizing iris scissors. There was minimal undermining beneath the pedicle flap. Keystone Flap Text: The defect edges were debeveled with a #15 scalpel blade. Given the location of the defect, shape of the defect a keystone flap was deemed most appropriate. Using a sterile surgical marker, an appropriate keystone flap was drawn incorporating the defect, outlining the appropriate donor tissue and placing the expected incisions within the relaxed skin tension lines where possible. The area thus outlined was incised deep to adipose tissue with a #15 scalpel blade. The skin margins were undermined to an appropriate distance in all directions around the primary defect and laterally outward around the flap utilizing iris scissors. O-T Plasty Text: The defect edges were debeveled with a #15 scalpel blade. Given the location of the defect, shape of the defect and the proximity to free margins an O-T plasty was deemed most appropriate. Using a sterile surgical marker, an appropriate O-T plasty was drawn incorporating the defect and placing the expected incisions within the relaxed skin tension lines where possible. The area thus outlined was incised deep to adipose tissue with a #15 scalpel blade. The skin margins were undermined to an appropriate distance in all directions utilizing iris scissors. O-Z Plasty Text: The defect edges were debeveled with a #15 scalpel blade. Given the location of the defect, shape of the defect and the proximity to free margins an O-Z plasty (double transposition flap) was deemed most appropriate. Using a sterile surgical marker, the appropriate transposition flaps were drawn incorporating the defect and placing the expected incisions within the relaxed skin tension lines where possible. The area thus outlined was incised deep to adipose tissue with a #15 scalpel blade. The skin margins were undermined to an appropriate distance in all directions utilizing iris scissors. Hemostasis was achieved with electrocautery. The flaps were then transposed into place, one clockwise and the other counterclockwise, and anchored with interrupted buried subcutaneous sutures. Double O-Z Plasty Text: The defect edges were debeveled with a #15 scalpel blade. Given the location of the defect, shape of the defect and the proximity to free margins a Double O-Z plasty (double transposition flap) was deemed most appropriate. Using a sterile surgical marker, the appropriate transposition flaps were drawn incorporating the defect and placing the expected incisions within the relaxed skin tension lines where possible. The area thus outlined was incised deep to adipose tissue with a #15 scalpel blade. The skin margins were undermined to an appropriate distance in all directions utilizing iris scissors. Hemostasis was achieved with electrocautery. The flaps were then transposed into place, one clockwise and the other counterclockwise, and anchored with interrupted buried subcutaneous sutures. V-Y Plasty Text: The defect edges were debeveled with a #15 scalpel blade. Given the location of the defect, shape of the defect and the proximity to free margins an V-Y advancement flap was deemed most appropriate. Using a sterile surgical marker, an appropriate advancement flap was drawn incorporating the defect and placing the expected incisions within the relaxed skin tension lines where possible. The area thus outlined was incised deep to adipose tissue with a #15 scalpel blade. The skin margins were undermined to an appropriate distance in all directions utilizing iris scissors. H Plasty Text: Given the location of the defect, shape of the defect and the proximity to free margins a H-plasty was deemed most appropriate for repair. Using a sterile surgical marker, the appropriate advancement arms of the H-plasty were drawn incorporating the defect and placing the expected incisions within the relaxed skin tension lines where possible. The area thus outlined was incised deep to adipose tissue with a #15 scalpel blade. The skin margins were undermined to an appropriate distance in all directions utilizing iris scissors. The opposing advancement arms were then advanced into place in opposite direction and anchored with interrupted buried subcutaneous sutures. W Plasty Text: The lesion was extirpated to the level of the fat with a #15 scalpel blade. Given the location of the defect, shape of the defect and the proximity to free margins a W-plasty was deemed most appropriate for repair. Using a sterile surgical marker, the appropriate transposition arms of the W-plasty were drawn incorporating the defect and placing the expected incisions within the relaxed skin tension lines where possible. The area thus outlined was incised deep to adipose tissue with a #15 scalpel blade. The skin margins were undermined to an appropriate distance in all directions utilizing iris scissors. The opposing transposition arms were then transposed into place in opposite direction and anchored with interrupted buried subcutaneous sutures. Z Plasty Text: The lesion was extirpated to the level of the fat with a #15 scalpel blade. Given the location of the defect, shape of the defect and the proximity to free margins a Z-plasty was deemed most appropriate for repair. Using a sterile surgical marker, the appropriate transposition arms of the Z-plasty were drawn incorporating the defect and placing the expected incisions within the relaxed skin tension lines where possible. The area thus outlined was incised deep to adipose tissue with a #15 scalpel blade. The skin margins were undermined to an appropriate distance in all directions utilizing iris scissors. The opposing transposition arms were then transposed into place in opposite direction and anchored with interrupted buried subcutaneous sutures. Cheek Interpolation Flap Text: A decision was made to reconstruct the defect utilizing an interpolation axial flap and a staged reconstruction. A telfa template was made of the defect. This telfa template was then used to outline the Cheek Interpolation flap. The donor area for the pedicle flap was then injected with anesthesia. The flap was excised through the skin and subcutaneous tissue down to the layer of the underlying musculature. The interpolation flap was carefully excised within this deep plane to maintain its blood supply. The edges of the donor site were undermined. The donor site was closed in a primary fashion. The pedicle was then rotated into position and sutured. Once the tube was sutured into place, adequate blood supply was confirmed with blanching and refill. The pedicle was then wrapped with xeroform gauze and dressed appropriately with a telfa and gauze bandage to ensure continued blood supply and protect the attached pedicle. Cheek-To-Nose Interpolation Flap Text: A decision was made to reconstruct the defect utilizing an interpolation axial flap and a staged reconstruction. A telfa template was made of the defect. This telfa template was then used to outline the Cheek-To-Nose Interpolation flap. The donor area for the pedicle flap was then injected with anesthesia. The flap was excised through the skin and subcutaneous tissue down to the layer of the underlying musculature. The interpolation flap was carefully excised within this deep plane to maintain its blood supply. The edges of the donor site were undermined. The donor site was closed in a primary fashion. The pedicle was then rotated into position and sutured. Once the tube was sutured into place, adequate blood supply was confirmed with blanching and refill. The pedicle was then wrapped with xeroform gauze and dressed appropriately with a telfa and gauze bandage to ensure continued blood supply and protect the attached pedicle. Interpolation Flap Text: A decision was made to reconstruct the defect utilizing an interpolation axial flap and a staged reconstruction. A telfa template was made of the defect. This telfa template was then used to outline the interpolation flap. The donor area for the pedicle flap was then injected with anesthesia. The flap was excised through the skin and subcutaneous tissue down to the layer of the underlying musculature. The interpolation flap was carefully excised within this deep plane to maintain its blood supply. The edges of the donor site were undermined. The donor site was closed in a primary fashion. The pedicle was then rotated into position and sutured. Once the tube was sutured into place, adequate blood supply was confirmed with blanching and refill. The pedicle was then wrapped with xeroform gauze and dressed appropriately with a telfa and gauze bandage to ensure continued blood supply and protect the attached pedicle. Melolabial Interpolation Flap Text: A decision was made to reconstruct the defect utilizing an interpolation axial flap and a staged reconstruction. A telfa template was made of the defect. This telfa template was then used to outline the melolabial interpolation flap. The donor area for the pedicle flap was then injected with anesthesia. The flap was excised through the skin and subcutaneous tissue down to the layer of the underlying musculature. The pedicle flap was carefully excised within this deep plane to maintain its blood supply. The edges of the donor site were undermined. The donor site was closed in a primary fashion. The pedicle was then rotated into position and sutured. Once the tube was sutured into place, adequate blood supply was confirmed with blanching and refill. The pedicle was then wrapped with xeroform gauze and dressed appropriately with a telfa and gauze bandage to ensure continued blood supply and protect the attached pedicle. Mastoid Interpolation Flap Text: A decision was made to reconstruct the defect utilizing an interpolation axial flap and a staged reconstruction. A telfa template was made of the defect. This telfa template was then used to outline the mastoid interpolation flap. The donor area for the pedicle flap was then injected with anesthesia. The flap was excised through the skin and subcutaneous tissue down to the layer of the underlying musculature. The pedicle flap was carefully excised within this deep plane to maintain its blood supply. The edges of the donor site were undermined. The donor site was closed in a primary fashion. The pedicle was then rotated into position and sutured. Once the tube was sutured into place, adequate blood supply was confirmed with blanching and refill. The pedicle was then wrapped with xeroform gauze and dressed appropriately with a telfa and gauze bandage to ensure continued blood supply and protect the attached pedicle. Posterior Auricular Interpolation Flap Text: A decision was made to reconstruct the defect utilizing an interpolation axial flap and a staged reconstruction. A telfa template was made of the defect. This telfa template was then used to outline the posterior auricular interpolation flap. The donor area for the pedicle flap was then injected with anesthesia. The flap was excised through the skin and subcutaneous tissue down to the layer of the underlying musculature. The pedicle flap was carefully excised within this deep plane to maintain its blood supply. The edges of the donor site were undermined. The donor site was closed in a primary fashion. The pedicle was then rotated into position and sutured. Once the tube was sutured into place, adequate blood supply was confirmed with blanching and refill. The pedicle was then wrapped with xeroform gauze and dressed appropriately with a telfa and gauze bandage to ensure continued blood supply and protect the attached pedicle. Paramedian Forehead Flap Text: A decision was made to reconstruct the defect utilizing an interpolation axial flap and a staged reconstruction. A telfa template was made of the defect. This telfa template was then used to outline the paramedian forehead pedicle flap. The donor area for the pedicle flap was then injected with anesthesia. The flap was excised through the skin and subcutaneous tissue down to the layer of the underlying musculature. The pedicle flap was carefully excised within this deep plane to maintain its blood supply. The edges of the donor site were undermined. The donor site was closed in a primary fashion. The pedicle was then rotated into position and sutured. Once the tube was sutured into place, adequate blood supply was confirmed with blanching and refill. The pedicle was then wrapped with xeroform gauze and dressed appropriately with a telfa and gauze bandage to ensure continued blood supply and protect the attached pedicle. Lip Wedge Excision Repair Text: Given the location of the defect and the proximity to free margins a full thickness wedge repair was deemed most appropriate. Using a sterile surgical marker, the appropriate repair was drawn incorporating the defect and placing the expected incisions perpendicular to the vermilion border. The vermilion border was also meticulously outlined to ensure appropriate reapproximation during the repair. The area thus outlined was incised through and through with a #15 scalpel blade. The muscularis and dermis were reaproximated with deep sutures following hemostasis. Care was taken to realign the vermilion border before proceeding with the superficial closure. Once the vermilion was realigned the superfical and mucosal closure was finished. Ftsg Text: The defect edges were debeveled with a #15 scalpel blade. Given the location of the defect, shape of the defect and the proximity to free margins a full thickness skin graft was deemed most appropriate. Using a sterile surgical marker, the primary defect shape was transferred to the donor site. The area thus outlined was incised deep to adipose tissue with a #15 scalpel blade. The harvested graft was then trimmed of adipose tissue until only dermis and epidermis was left. The skin margins of the secondary defect were undermined to an appropriate distance in all directions utilizing iris scissors. The secondary defect was closed with interrupted buried subcutaneous sutures. The skin edges were then re-apposed with running  sutures. The skin graft was then placed in the primary defect and oriented appropriately. Split-Thickness Skin Graft Text: The defect edges were debeveled with a #15 scalpel blade. Given the location of the defect, shape of the defect and the proximity to free margins a split thickness skin graft was deemed most appropriate. Using a sterile surgical marker, the primary defect shape was transferred to the donor site. The split thickness graft was then harvested. The skin graft was then placed in the primary defect and oriented appropriately. Burow's Graft Text: The defect edges were debeveled with a #15 scalpel blade. Given the location of the defect, shape of the defect, the proximity to free margins and the presence of a standing cone deformity a Burow's skin graft was deemed most appropriate. The standing cone was removed and this tissue was then trimmed to the shape of the primary defect. The adipose tissue was also removed until only dermis and epidermis were left. The skin margins of the secondary defect were undermined to an appropriate distance in all directions utilizing iris scissors. The secondary defect was closed with interrupted buried subcutaneous sutures. The skin edges were then re-apposed with running  sutures. The skin graft was then placed in the primary defect and oriented appropriately. Cartilage Graft Text: The defect edges were debeveled with a #15 scalpel blade. Given the location of the defect, shape of the defect, the fact the defect involved a full thickness cartilage defect a cartilage graft was deemed most appropriate. An appropriate donor site was identified, cleansed, and anesthetized. The cartilage graft was then harvested and transferred to the recipient site, oriented appropriately and then sutured into place. The secondary defect was then repaired using a primary closure. Composite Graft Text: The defect edges were debeveled with a #15 scalpel blade. Given the location of the defect, shape of the defect, the proximity to free margins and the fact the defect was full thickness a composite graft was deemed most appropriate. The defect was outline and then transferred to the donor site. A full thickness graft was then excised from the donor site. The graft was then placed in the primary defect, oriented appropriately and then sutured into place. The secondary defect was then repaired using a primary closure. Epidermal Autograft Text: The defect edges were debeveled with a #15 scalpel blade. Given the location of the defect, shape of the defect and the proximity to free margins an epidermal autograft was deemed most appropriate. Using a sterile surgical marker, the primary defect shape was transferred to the donor site. The epidermal graft was then harvested. The skin graft was then placed in the primary defect and oriented appropriately. Dermal Autograft Text: The defect edges were debeveled with a #15 scalpel blade. Given the location of the defect, shape of the defect and the proximity to free margins a dermal autograft was deemed most appropriate. Using a sterile surgical marker, the primary defect shape was transferred to the donor site. The area thus outlined was incised deep to adipose tissue with a #15 scalpel blade. The harvested graft was then trimmed of adipose and epidermal tissue until only dermis was left. The skin graft was then placed in the primary defect and oriented appropriately. Skin Substitute Text: The defect edges were debeveled with a #15 scalpel blade. Given the location of the defect, shape of the defect and the proximity to free margins a skin substitute graft was deemed most appropriate. The graft material was trimmed to fit the size of the defect. The graft was then placed in the primary defect and oriented appropriately. Tissue Cultured Epidermal Autograft Text: The defect edges were debeveled with a #15 scalpel blade. Given the location of the defect, shape of the defect and the proximity to free margins a tissue cultured epidermal autograft was deemed most appropriate. The graft was then trimmed to fit the size of the defect. The graft was then placed in the primary defect and oriented appropriately. Xenograft Text: The defect edges were debeveled with a #15 scalpel blade. Given the location of the defect, shape of the defect and the proximity to free margins a xenograft was deemed most appropriate. The graft was then trimmed to fit the size of the defect. The graft was then placed in the primary defect and oriented appropriately. Purse String (Intermediate) Text: Given the location of the defect and the characteristics of the surrounding skin a pursestring intermediate closure was deemed most appropriate. Undermining was performed circumfirentially around the surgical defect. A purstring suture was then placed and tightened. Purse String (Simple) Text: Given the location of the defect and the characteristics of the surrounding skin a purse string simple closure was deemed most appropriate. Undermining was performed circumferentially around the surgical defect. A purse string suture was then placed and tightened. Partial Purse String (Intermediate) Text: Given the location of the defect and the characteristics of the surrounding skin an intermediate purse string closure was deemed most appropriate. Undermining was performed circumferentially around the surgical defect. A purse string suture was then placed and tightened. Wound tension of the circular defect prevented complete closure of the wound. Partial Purse String (Simple) Text: Given the location of the defect and the characteristics of the surrounding skin a simple purse string closure was deemed most appropriate. Undermining was performed circumferentially around the surgical defect. A purse string suture was then placed and tightened. Wound tension of the circular defect prevented complete closure of the wound. Complex Repair And Single Advancement Flap Text: The defect edges were debeveled with a #15 scalpel blade. The primary defect was closed partially with a complex linear closure. Given the location of the remaining defect, shape of the defect and the proximity to free margins a single advancement flap was deemed most appropriate for complete closure of the defect. Using a sterile surgical marker, an appropriate advancement flap was drawn incorporating the defect and placing the expected incisions within the relaxed skin tension lines where possible. The area thus outlined was incised deep to adipose tissue with a #15 scalpel blade. The skin margins were undermined to an appropriate distance in all directions utilizing iris scissors. Complex Repair And Double Advancement Flap Text: The defect edges were debeveled with a #15 scalpel blade. The primary defect was closed partially with a complex linear closure. Given the location of the remaining defect, shape of the defect and the proximity to free margins a double advancement flap was deemed most appropriate for complete closure of the defect. Using a sterile surgical marker, an appropriate advancement flap was drawn incorporating the defect and placing the expected incisions within the relaxed skin tension lines where possible. The area thus outlined was incised deep to adipose tissue with a #15 scalpel blade. The skin margins were undermined to an appropriate distance in all directions utilizing iris scissors. Complex Repair And Modified Advancement Flap Text: The defect edges were debeveled with a #15 scalpel blade. The primary defect was closed partially with a complex linear closure. Given the location of the remaining defect, shape of the defect and the proximity to free margins a modified advancement flap was deemed most appropriate for complete closure of the defect. Using a sterile surgical marker, an appropriate advancement flap was drawn incorporating the defect and placing the expected incisions within the relaxed skin tension lines where possible. The area thus outlined was incised deep to adipose tissue with a #15 scalpel blade. The skin margins were undermined to an appropriate distance in all directions utilizing iris scissors. Complex Repair And A-T Advancement Flap Text: The defect edges were debeveled with a #15 scalpel blade. The primary defect was closed partially with a complex linear closure. Given the location of the remaining defect, shape of the defect and the proximity to free margins an A-T advancement flap was deemed most appropriate for complete closure of the defect. Using a sterile surgical marker, an appropriate advancement flap was drawn incorporating the defect and placing the expected incisions within the relaxed skin tension lines where possible. The area thus outlined was incised deep to adipose tissue with a #15 scalpel blade. The skin margins were undermined to an appropriate distance in all directions utilizing iris scissors. Complex Repair And O-T Advancement Flap Text: The defect edges were debeveled with a #15 scalpel blade. The primary defect was closed partially with a complex linear closure. Given the location of the remaining defect, shape of the defect and the proximity to free margins an O-T advancement flap was deemed most appropriate for complete closure of the defect. Using a sterile surgical marker, an appropriate advancement flap was drawn incorporating the defect and placing the expected incisions within the relaxed skin tension lines where possible. The area thus outlined was incised deep to adipose tissue with a #15 scalpel blade. The skin margins were undermined to an appropriate distance in all directions utilizing iris scissors. Complex Repair And O-L Flap Text: The defect edges were debeveled with a #15 scalpel blade. The primary defect was closed partially with a complex linear closure. Given the location of the remaining defect, shape of the defect and the proximity to free margins an O-L flap was deemed most appropriate for complete closure of the defect. Using a sterile surgical marker, an appropriate flap was drawn incorporating the defect and placing the expected incisions within the relaxed skin tension lines where possible. The area thus outlined was incised deep to adipose tissue with a #15 scalpel blade. The skin margins were undermined to an appropriate distance in all directions utilizing iris scissors. Complex Repair And Bilobe Flap Text: The defect edges were debeveled with a #15 scalpel blade. The primary defect was closed partially with a complex linear closure. Given the location of the remaining defect, shape of the defect and the proximity to free margins a bilobe flap was deemed most appropriate for complete closure of the defect. Using a sterile surgical marker, an appropriate advancement flap was drawn incorporating the defect and placing the expected incisions within the relaxed skin tension lines where possible. The area thus outlined was incised deep to adipose tissue with a #15 scalpel blade. The skin margins were undermined to an appropriate distance in all directions utilizing iris scissors. Complex Repair And Melolabial Flap Text: The defect edges were debeveled with a #15 scalpel blade. The primary defect was closed partially with a complex linear closure. Given the location of the remaining defect, shape of the defect and the proximity to free margins a melolabial flap was deemed most appropriate for complete closure of the defect. Using a sterile surgical marker, an appropriate advancement flap was drawn incorporating the defect and placing the expected incisions within the relaxed skin tension lines where possible. The area thus outlined was incised deep to adipose tissue with a #15 scalpel blade. The skin margins were undermined to an appropriate distance in all directions utilizing iris scissors. Complex Repair And Rotation Flap Text: The defect edges were debeveled with a #15 scalpel blade. The primary defect was closed partially with a complex linear closure. Given the location of the remaining defect, shape of the defect and the proximity to free margins a rotation flap was deemed most appropriate for complete closure of the defect. Using a sterile surgical marker, an appropriate advancement flap was drawn incorporating the defect and placing the expected incisions within the relaxed skin tension lines where possible. The area thus outlined was incised deep to adipose tissue with a #15 scalpel blade. The skin margins were undermined to an appropriate distance in all directions utilizing iris scissors. Complex Repair And Rhombic Flap Text: The defect edges were debeveled with a #15 scalpel blade. The primary defect was closed partially with a complex linear closure. Given the location of the remaining defect, shape of the defect and the proximity to free margins a rhombic flap was deemed most appropriate for complete closure of the defect. Using a sterile surgical marker, an appropriate advancement flap was drawn incorporating the defect and placing the expected incisions within the relaxed skin tension lines where possible. The area thus outlined was incised deep to adipose tissue with a #15 scalpel blade. The skin margins were undermined to an appropriate distance in all directions utilizing iris scissors. Complex Repair And Transposition Flap Text: The defect edges were debeveled with a #15 scalpel blade. The primary defect was closed partially with a complex linear closure. Given the location of the remaining defect, shape of the defect and the proximity to free margins a transposition flap was deemed most appropriate for complete closure of the defect. Using a sterile surgical marker, an appropriate advancement flap was drawn incorporating the defect and placing the expected incisions within the relaxed skin tension lines where possible. The area thus outlined was incised deep to adipose tissue with a #15 scalpel blade. The skin margins were undermined to an appropriate distance in all directions utilizing iris scissors. Complex Repair And V-Y Plasty Text: The defect edges were debeveled with a #15 scalpel blade. The primary defect was closed partially with a complex linear closure. Given the location of the remaining defect, shape of the defect and the proximity to free margins a V-Y plasty was deemed most appropriate for complete closure of the defect. Using a sterile surgical marker, an appropriate advancement flap was drawn incorporating the defect and placing the expected incisions within the relaxed skin tension lines where possible. The area thus outlined was incised deep to adipose tissue with a #15 scalpel blade. The skin margins were undermined to an appropriate distance in all directions utilizing iris scissors. Complex Repair And M Plasty Text: The defect edges were debeveled with a #15 scalpel blade. The primary defect was closed partially with a complex linear closure. Given the location of the remaining defect, shape of the defect and the proximity to free margins an M plasty was deemed most appropriate for complete closure of the defect. Using a sterile surgical marker, an appropriate advancement flap was drawn incorporating the defect and placing the expected incisions within the relaxed skin tension lines where possible. The area thus outlined was incised deep to adipose tissue with a #15 scalpel blade. The skin margins were undermined to an appropriate distance in all directions utilizing iris scissors. Complex Repair And Double M Plasty Text: The defect edges were debeveled with a #15 scalpel blade. The primary defect was closed partially with a complex linear closure. Given the location of the remaining defect, shape of the defect and the proximity to free margins a double M plasty was deemed most appropriate for complete closure of the defect. Using a sterile surgical marker, an appropriate advancement flap was drawn incorporating the defect and placing the expected incisions within the relaxed skin tension lines where possible. The area thus outlined was incised deep to adipose tissue with a #15 scalpel blade. The skin margins were undermined to an appropriate distance in all directions utilizing iris scissors. Complex Repair And W Plasty Text: The defect edges were debeveled with a #15 scalpel blade. The primary defect was closed partially with a complex linear closure. Given the location of the remaining defect, shape of the defect and the proximity to free margins a W plasty was deemed most appropriate for complete closure of the defect. Using a sterile surgical marker, an appropriate advancement flap was drawn incorporating the defect and placing the expected incisions within the relaxed skin tension lines where possible. The area thus outlined was incised deep to adipose tissue with a #15 scalpel blade. The skin margins were undermined to an appropriate distance in all directions utilizing iris scissors. Complex Repair And Z Plasty Text: The defect edges were debeveled with a #15 scalpel blade. The primary defect was closed partially with a complex linear closure. Given the location of the remaining defect, shape of the defect and the proximity to free margins a Z plasty was deemed most appropriate for complete closure of the defect. Using a sterile surgical marker, an appropriate advancement flap was drawn incorporating the defect and placing the expected incisions within the relaxed skin tension lines where possible. The area thus outlined was incised deep to adipose tissue with a #15 scalpel blade. The skin margins were undermined to an appropriate distance in all directions utilizing iris scissors. Complex Repair And Dorsal Nasal Flap Text: The defect edges were debeveled with a #15 scalpel blade. The primary defect was closed partially with a complex linear closure. Given the location of the remaining defect, shape of the defect and the proximity to free margins a dorsal nasal flap was deemed most appropriate for complete closure of the defect. Using a sterile surgical marker, an appropriate flap was drawn incorporating the defect and placing the expected incisions within the relaxed skin tension lines where possible. The area thus outlined was incised deep to adipose tissue with a #15 scalpel blade. The skin margins were undermined to an appropriate distance in all directions utilizing iris scissors. Complex Repair And Ftsg Text: The defect edges were debeveled with a #15 scalpel blade. The primary defect was closed partially with a complex linear closure. Given the location of the defect, shape of the defect and the proximity to free margins a full thickness skin graft was deemed most appropriate to repair the remaining defect. The graft was trimmed to fit the size of the remaining defect. The graft was then placed in the primary defect, oriented appropriately, and sutured into place. Complex Repair And Burow's Graft Text: The defect edges were debeveled with a #15 scalpel blade. The primary defect was closed partially with a complex linear closure. Given the location of the defect, shape of the defect, the proximity to free margins and the presence of a standing cone deformity a Burow's graft was deemed most appropriate to repair the remaining defect. The graft was trimmed to fit the size of the remaining defect. The graft was then placed in the primary defect, oriented appropriately, and sutured into place. Complex Repair And Split-Thickness Skin Graft Text: The defect edges were debeveled with a #15 scalpel blade. The primary defect was closed partially with a complex linear closure. Given the location of the defect, shape of the defect and the proximity to free margins a split thickness skin graft was deemed most appropriate to repair the remaining defect. The graft was trimmed to fit the size of the remaining defect. The graft was then placed in the primary defect, oriented appropriately, and sutured into place. Complex Repair And Epidermal Autograft Text: The defect edges were debeveled with a #15 scalpel blade. The primary defect was closed partially with a complex linear closure. Given the location of the defect, shape of the defect and the proximity to free margins an epidermal autograft was deemed most appropriate to repair the remaining defect. The graft was trimmed to fit the size of the remaining defect. The graft was then placed in the primary defect, oriented appropriately, and sutured into place. Complex Repair And Dermal Autograft Text: The defect edges were debeveled with a #15 scalpel blade. The primary defect was closed partially with a complex linear closure. Given the location of the defect, shape of the defect and the proximity to free margins an dermal autograft was deemed most appropriate to repair the remaining defect. The graft was trimmed to fit the size of the remaining defect. The graft was then placed in the primary defect, oriented appropriately, and sutured into place. Complex Repair And Tissue Cultured Epidermal Autograft Text: The defect edges were debeveled with a #15 scalpel blade. The primary defect was closed partially with a complex linear closure. Given the location of the defect, shape of the defect and the proximity to free margins an tissue cultured epidermal autograft was deemed most appropriate to repair the remaining defect. The graft was trimmed to fit the size of the remaining defect. The graft was then placed in the primary defect, oriented appropriately, and sutured into place. Complex Repair And Xenograft Text: The defect edges were debeveled with a #15 scalpel blade. The primary defect was closed partially with a complex linear closure. Given the location of the defect, shape of the defect and the proximity to free margins a xenograft was deemed most appropriate to repair the remaining defect. The graft was trimmed to fit the size of the remaining defect. The graft was then placed in the primary defect, oriented appropriately, and sutured into place. Complex Repair And Skin Substitute Graft Text: The defect edges were debeveled with a #15 scalpel blade. The primary defect was closed partially with a complex linear closure. Given the location of the remaining defect, shape of the defect and the proximity to free margins a skin substitute graft was deemed most appropriate to repair the remaining defect. The graft was trimmed to fit the size of the remaining defect. The graft was then placed in the primary defect, oriented appropriately, and sutured into place. Path Notes (To The Dermatopathologist): Please check margins. Consent was obtained from the patient. The risks and benefits to therapy were discussed in detail. Specifically, the risks of infection, scarring, bleeding, prolonged wound healing, incomplete removal, allergy to anesthesia, nerve injury and recurrence were addressed. Prior to the procedure, the treatment site was clearly identified and confirmed by the patient. All components of Universal Protocol/PAUSE Rule completed. Post-Care Instructions: I reviewed with the patient in detail post-care instructions. Patient is not to engage in any heavy lifting, exercise, or swimming for the next 14 days. Should the patient develop any fevers, chills, bleeding, severe pain patient will contact the office immediately. Home Suture Removal Text: Patient was provided a home suture removal kit and will remove their sutures at home. If they have any questions or difficulties they will call the office. Where Do You Want The Question To Include Opioid Counseling Located?: Case Summary Tab Information: Selecting Yes will display possible errors in your note based on the variables you have selected. This validation is only offered as a suggestion for you. PLEASE NOTE THAT THE VALIDATION TEXT WILL BE REMOVED WHEN YOU FINALIZE YOUR NOTE. IF YOU WANT TO FAX A PRELIMINARY NOTE YOU WILL NEED TO TOGGLE THIS TO 'NO' IF YOU DO NOT WANT IT IN YOUR FAXED NOTE.

## 2020-10-28 NOTE — TELEPHONE ENCOUNTER
"Pt would like to know what Lubricant is the \"safest for a woman her age.\"    Pt states she has heard conflicting things about ones to use and would like some feed back.    Please advise.      Aide Carlin CMA 10/28/2020 11:17 AM            "

## 2020-11-23 ENCOUNTER — OFFICE VISIT (OUTPATIENT)
Dept: OBGYN | Facility: CLINIC | Age: 45
End: 2020-11-23
Payer: COMMERCIAL

## 2020-11-23 VITALS
OXYGEN SATURATION: 93 % | WEIGHT: 241.2 LBS | BODY MASS INDEX: 42.74 KG/M2 | DIASTOLIC BLOOD PRESSURE: 83 MMHG | HEART RATE: 76 BPM | SYSTOLIC BLOOD PRESSURE: 127 MMHG | HEIGHT: 63 IN | TEMPERATURE: 96.8 F

## 2020-11-23 DIAGNOSIS — Z13.220 SCREENING FOR HYPERLIPIDEMIA: ICD-10-CM

## 2020-11-23 DIAGNOSIS — Z12.31 ENCOUNTER FOR SCREENING MAMMOGRAM FOR BREAST CANCER: ICD-10-CM

## 2020-11-23 DIAGNOSIS — Z11.4 SCREENING FOR HIV (HUMAN IMMUNODEFICIENCY VIRUS): ICD-10-CM

## 2020-11-23 DIAGNOSIS — Z01.419 ENCOUNTER FOR GYNECOLOGICAL EXAMINATION WITHOUT ABNORMAL FINDING: Primary | ICD-10-CM

## 2020-11-23 DIAGNOSIS — Z11.59 NEED FOR HEPATITIS C SCREENING TEST: ICD-10-CM

## 2020-11-23 DIAGNOSIS — M35.01 SJOGREN'S SYNDROME WITH KERATOCONJUNCTIVITIS SICCA (H): ICD-10-CM

## 2020-11-23 LAB
ALBUMIN SERPL-MCNC: 3.4 G/DL (ref 3.4–5)
ALBUMIN UR-MCNC: NEGATIVE MG/DL
ALP SERPL-CCNC: 69 U/L (ref 40–150)
ALT SERPL W P-5'-P-CCNC: 46 U/L (ref 0–50)
ANION GAP SERPL CALCULATED.3IONS-SCNC: 3 MMOL/L (ref 3–14)
APPEARANCE UR: CLEAR
AST SERPL W P-5'-P-CCNC: 21 U/L (ref 0–45)
BASOPHILS # BLD AUTO: 0 10E9/L (ref 0–0.2)
BASOPHILS NFR BLD AUTO: 0.4 %
BILIRUB SERPL-MCNC: 0.6 MG/DL (ref 0.2–1.3)
BILIRUB UR QL STRIP: NEGATIVE
BUN SERPL-MCNC: 14 MG/DL (ref 7–30)
CALCIUM SERPL-MCNC: 9 MG/DL (ref 8.5–10.1)
CHLORIDE SERPL-SCNC: 106 MMOL/L (ref 94–109)
CHOLEST SERPL-MCNC: 181 MG/DL
CO2 SERPL-SCNC: 29 MMOL/L (ref 20–32)
COLOR UR AUTO: YELLOW
CREAT SERPL-MCNC: 0.84 MG/DL (ref 0.52–1.04)
CREAT UR-MCNC: 98 MG/DL
DIFFERENTIAL METHOD BLD: NORMAL
EOSINOPHIL # BLD AUTO: 0.6 10E9/L (ref 0–0.7)
EOSINOPHIL NFR BLD AUTO: 7.4 %
ERYTHROCYTE [DISTWIDTH] IN BLOOD BY AUTOMATED COUNT: 12.7 % (ref 10–15)
GFR SERPL CREATININE-BSD FRML MDRD: 83 ML/MIN/{1.73_M2}
GLUCOSE SERPL-MCNC: 89 MG/DL (ref 70–99)
GLUCOSE UR STRIP-MCNC: NEGATIVE MG/DL
HCT VFR BLD AUTO: 43.6 % (ref 35–47)
HDLC SERPL-MCNC: 45 MG/DL
HGB BLD-MCNC: 15 G/DL (ref 11.7–15.7)
HGB UR QL STRIP: ABNORMAL
KETONES UR STRIP-MCNC: NEGATIVE MG/DL
LDLC SERPL CALC-MCNC: 113 MG/DL
LEUKOCYTE ESTERASE UR QL STRIP: ABNORMAL
LYMPHOCYTES # BLD AUTO: 2.4 10E9/L (ref 0.8–5.3)
LYMPHOCYTES NFR BLD AUTO: 31.7 %
MCH RBC QN AUTO: 30.7 PG (ref 26.5–33)
MCHC RBC AUTO-ENTMCNC: 34.4 G/DL (ref 31.5–36.5)
MCV RBC AUTO: 89 FL (ref 78–100)
MONOCYTES # BLD AUTO: 0.9 10E9/L (ref 0–1.3)
MONOCYTES NFR BLD AUTO: 12.3 %
MUCOUS THREADS #/AREA URNS LPF: PRESENT /LPF
NEUTROPHILS # BLD AUTO: 3.7 10E9/L (ref 1.6–8.3)
NEUTROPHILS NFR BLD AUTO: 48.2 %
NITRATE UR QL: NEGATIVE
NON-SQ EPI CELLS #/AREA URNS LPF: ABNORMAL /LPF
NONHDLC SERPL-MCNC: 136 MG/DL
PH UR STRIP: 6 PH (ref 5–7)
PLATELET # BLD AUTO: 254 10E9/L (ref 150–450)
POTASSIUM SERPL-SCNC: 4 MMOL/L (ref 3.4–5.3)
PROT SERPL-MCNC: 7.4 G/DL (ref 6.8–8.8)
PROT UR-MCNC: <0.05 G/L
PROT/CREAT 24H UR: NORMAL G/G CR (ref 0–0.2)
RBC # BLD AUTO: 4.88 10E12/L (ref 3.8–5.2)
RBC #/AREA URNS AUTO: ABNORMAL /HPF
SODIUM SERPL-SCNC: 138 MMOL/L (ref 133–144)
SOURCE: ABNORMAL
SP GR UR STRIP: 1.01 (ref 1–1.03)
TRIGL SERPL-MCNC: 115 MG/DL
UROBILINOGEN UR STRIP-ACNC: 0.2 EU/DL (ref 0.2–1)
WBC # BLD AUTO: 7.6 10E9/L (ref 4–11)
WBC #/AREA URNS AUTO: ABNORMAL /HPF

## 2020-11-23 PROCEDURE — 80061 LIPID PANEL: CPT | Performed by: NURSE PRACTITIONER

## 2020-11-23 PROCEDURE — 86803 HEPATITIS C AB TEST: CPT | Performed by: NURSE PRACTITIONER

## 2020-11-23 PROCEDURE — 99396 PREV VISIT EST AGE 40-64: CPT | Performed by: NURSE PRACTITIONER

## 2020-11-23 PROCEDURE — 84156 ASSAY OF PROTEIN URINE: CPT | Performed by: INTERNAL MEDICINE

## 2020-11-23 PROCEDURE — 81001 URINALYSIS AUTO W/SCOPE: CPT | Performed by: INTERNAL MEDICINE

## 2020-11-23 PROCEDURE — 85025 COMPLETE CBC W/AUTO DIFF WBC: CPT | Performed by: INTERNAL MEDICINE

## 2020-11-23 PROCEDURE — 36415 COLL VENOUS BLD VENIPUNCTURE: CPT | Performed by: INTERNAL MEDICINE

## 2020-11-23 PROCEDURE — 87389 HIV-1 AG W/HIV-1&-2 AB AG IA: CPT | Performed by: NURSE PRACTITIONER

## 2020-11-23 PROCEDURE — 80053 COMPREHEN METABOLIC PANEL: CPT | Performed by: INTERNAL MEDICINE

## 2020-11-23 ASSESSMENT — PAIN SCALES - GENERAL: PAINLEVEL: NO PAIN (0)

## 2020-11-23 ASSESSMENT — MIFFLIN-ST. JEOR: SCORE: 1699.24

## 2020-11-23 NOTE — PROGRESS NOTES
SUBJECTIVE:   CC: Jesica Mooney is an 45 year old woman who presents for preventive health visit.     {Healthy Habits:     Getting at least 3 servings of Calcium per day:  Yes    Bi-annual eye exam:  Yes    Dental care twice a year:  Yes    Sleep apnea or symptoms of sleep apnea:  None    Diet:  Regular (no restrictions)    Frequency of exercise:  2-3 days/week    Duration of exercise:  15-30 minutes    Taking medications regularly:  Yes    Medication side effects:  Not applicable    PHQ-2 Total Score: 0    Additional concerns today:  No    Mirena IUD in place, due for replacement next year.  Patient had labs done this morning-was fasting at the time. Confirmed with lab they are able to add in labs to the sample drawn this morning.      Today's PHQ-2 Score:   PHQ-2 ( 1999 Pfizer) 11/23/2020   Q1: Little interest or pleasure in doing things 0   Q2: Feeling down, depressed or hopeless 0   PHQ-2 Score 0   Q1: Little interest or pleasure in doing things Not at all   Q2: Feeling down, depressed or hopeless Not at all   PHQ-2 Score 0       Abuse: Current or Past (Physical, Sexual or Emotional) - No  Do you feel safe in your environment? Yes        Social History     Tobacco Use     Smoking status: Never Smoker     Smokeless tobacco: Never Used     Tobacco comment: Lives in smoke free household   Substance Use Topics     Alcohol use: No     Alcohol/week: 0.0 standard drinks         Alcohol Use 11/23/2020   Prescreen: >3 drinks/day or >7 drinks/week? Not Applicable   No flowsheet data found.    Reviewed orders with patient.  Reviewed health maintenance and updated orders accordingly - Yes  Patient Active Problem List   Diagnosis     BMI 40.0-44.9, adult (H)     CARDIOVASCULAR SCREENING; LDL GOAL LESS THAN 160     Other acute pulmonary embolism     Sjogren's syndrome (H)     Screening for cervical cancer     Past Surgical History:   Procedure Laterality Date     BIOPSY  a fews years ago    A mole was removed from by  scalp     C ORAL SURGERY PROCEDURE  10/1990    wisdom teeth extracted       Social History     Tobacco Use     Smoking status: Never Smoker     Smokeless tobacco: Never Used     Tobacco comment: Lives in smoke free household   Substance Use Topics     Alcohol use: No     Alcohol/week: 0.0 standard drinks     Family History   Problem Relation Age of Onset     Eye Disorder Mother         cataracts      Rheumatoid Arthritis Mother      Cancer - colorectal Paternal Grandmother         ?     Thyroid Disease Father      Breast Cancer Maternal Aunt         diagnosed in 30s (mother's 1/2 sister)     Cancer No family hx of      Diabetes No family hx of      Hypertension No family hx of      Cerebrovascular Disease No family hx of      Macular Degeneration No family hx of      Glaucoma No family hx of            Mammogram Screening: Patient under age 50, mutual decision reflected in health maintenance.      Pertinent mammograms are reviewed under the imaging tab.  History of abnormal Pap smear: NO - age 30-65 PAP every 5 years with negative HPV co-testing recommended  PAP / HPV Latest Ref Rng & Units 11/26/2018 10/5/2015 4/23/2012   PAP - NIL NIL NIL   HPV 16 DNA NEG:Negative Negative Negative -   HPV 18 DNA NEG:Negative Negative Negative -   OTHER HR HPV NEG:Negative Negative Negative -     Reviewed and updated as needed this visit by clinical staff  Tobacco  Allergies  Meds   Med Hx  Surg Hx  Fam Hx  Soc Hx        Reviewed and updated as needed this visit by Provider                Past Medical History:   Diagnosis Date     IUD (intrauterine device) in place     Mirena inserted 12/28/15      Past Surgical History:   Procedure Laterality Date     BIOPSY  a fews years ago    A mole was removed from by scalp     C ORAL SURGERY PROCEDURE  10/1990    wisdom teeth extracted       Review of Systems  CONSTITUTIONAL: NEGATIVE for fever, chills, change in weight  INTEGUMENTARU/SKIN: NEGATIVE for worrisome rashes, moles or  "lesions  EYES: NEGATIVE for vision changes or irritation  ENT: NEGATIVE for ear, mouth and throat problems  RESP: NEGATIVE for significant cough or SOB  BREAST: NEGATIVE for masses, tenderness or discharge  CV: NEGATIVE for chest pain, palpitations or peripheral edema  GI: NEGATIVE for nausea, abdominal pain, heartburn, or change in bowel habits  : NEGATIVE for unusual urinary or vaginal symptoms. Periods suppressed with IUD.  MUSCULOSKELETAL: NEGATIVE for significant arthralgias or myalgia  NEURO: NEGATIVE for weakness, dizziness or paresthesias  PSYCHIATRIC: NEGATIVE for changes in mood or affect     OBJECTIVE:   /83 (BP Location: Right arm, Patient Position: Sitting, Cuff Size: Adult Large)   Pulse 76   Temp 96.8  F (36  C) (Tympanic)   Ht 1.594 m (5' 2.75\")   Wt 109.4 kg (241 lb 3.2 oz)   SpO2 93%   BMI 43.07 kg/m    Physical Exam  GENERAL: healthy, alert and no distress  EYES: Eyes grossly normal to inspection, PERRL and conjunctivae and sclerae normal  HENT: ear canals and TM's normal  NECK: no adenopathy, no asymmetry, masses, or scars and thyroid normal to palpation  RESP: lungs clear to auscultation - no rales, rhonchi or wheezes  BREAST: normal without masses, tenderness or nipple discharge and no palpable axillary masses or adenopathy  CV: regular rate and rhythm, normal S1 S2, no S3 or S4, no murmur, click or rub, no peripheral edema and peripheral pulses strong  ABDOMEN: soft, nontender, no hepatosplenomegaly, no masses and bowel sounds normal   (female): normal female external genitalia, normal urethral meatus, vaginal mucosa pink, moist, well rugated, and normal cervix/adnexa/uterus without masses or discharge. IUD strings visualized with some difficulty.  MS: no gross musculoskeletal defects noted, no edema  SKIN: no suspicious lesions or rashes  NEURO: Normal strength and tone, mentation intact and speech normal  PSYCH: mentation appears normal, affect " "normal/bright    ASSESSMENT/PLAN:   1. Encounter for gynecological examination without abnormal finding  Health maintenance updated. Discussed IUD replacement-difficult insertion with assistance of Dr. Drummond. Given difficulty visualizing cervix at this time, discussed replacement with him when she is ready.     2. Screening for HIV (human immunodeficiency virus)  - HIV Antigen Antibody Combo; Future    3. Need for hepatitis C screening test  - Hepatitis C antibody; Future    4. Screening for hyperlipidemia  - Lipid panel reflex to direct LDL Fasting; Future    5. Encounter for screening mammogram for breast cancer  - MA Screening Digital Bilateral; Future    COUNSELING:  Reviewed preventive health counseling, as reflected in patient instructions  Special attention given to:        Regular exercise       Healthy diet/nutrition       Contraception       Consider Hep C screening for all patients one time for ages 18-79 years       HIV screeninx in teen years, 1x in adult years, and at intervals if high risk    Estimated body mass index is 43.07 kg/m  as calculated from the following:    Height as of this encounter: 1.594 m (5' 2.75\").    Weight as of this encounter: 109.4 kg (241 lb 3.2 oz).    Weight management plan: Discussed healthy diet and exercise guidelines    She reports that she has never smoked. She has never used smokeless tobacco.      Counseling Resources:  ATP IV Guidelines  Pooled Cohorts Equation Calculator  Breast Cancer Risk Calculator  BRCA-Related Cancer Risk Assessment: FHS-7 Tool  FRAX Risk Assessment  ICSI Preventive Guidelines  Dietary Guidelines for Americans, 2010  USDA's MyPlate  ASA Prophylaxis  Lung CA Screening    JUDSON Pizarro CNP  [facility]  "

## 2020-11-24 LAB
HCV AB SERPL QL IA: NONREACTIVE
HIV 1+2 AB+HIV1 P24 AG SERPL QL IA: NONREACTIVE

## 2020-11-30 ENCOUNTER — VIRTUAL VISIT (OUTPATIENT)
Dept: RHEUMATOLOGY | Facility: CLINIC | Age: 45
End: 2020-11-30
Payer: COMMERCIAL

## 2020-11-30 DIAGNOSIS — M35.01 SJOGREN'S SYNDROME WITH KERATOCONJUNCTIVITIS SICCA (H): Primary | ICD-10-CM

## 2020-11-30 DIAGNOSIS — K11.8 MASS OF LEFT PAROTID GLAND: ICD-10-CM

## 2020-11-30 PROCEDURE — 99213 OFFICE O/P EST LOW 20 MIN: CPT | Mod: 95 | Performed by: INTERNAL MEDICINE

## 2020-11-30 NOTE — PROGRESS NOTES
"Jesica Mooney is a 45 year old female who is being evaluated via a billable video visit.      The patient has been notified of following:     \"This video visit will be conducted via a call between you and your physician/provider. We have found that certain health care needs can be provided without the need for an in-person physical exam.  This service lets us provide the care you need with a video conversation.  If a prescription is necessary we can send it directly to your pharmacy.  If lab work is needed we can place an order for that and you can then stop by our lab to have the test done at a later time.    Video visits are billed at different rates depending on your insurance coverage.  Please reach out to your insurance provider with any questions.    If during the course of the call the physician/provider feels a video visit is not appropriate, you will not be charged for this service.\"    Patient has given verbal consent for Video visit? Yes  How would you like to obtain your AVS? MyChart  If you are dropped from the video visit, the video invite should be resent to: Text to cell phone: [phone]  Will anyone else be joining your video visit? No      Brittanie Rouse CMA Rheumatology  11/30/2020 10:27 AM      Rheumatology Video Visit      Jesica Mooney MRN# [id number]   YOB: 1975 Age: 45 year old      Date of visit: 11/30/20   Hematology/Oncology: Dr. Flavia Leon and Dr. Ata Rendon  PCP: Brenda Graves    Chief Complaint   Patient presents with:  Arthritis      Assessment and Plan     1. Sjogren's Syndrome (JAN >1:87313, SSA >8):  She has punctal plugs currently; restasis was reportedly ineffective when she used a sample from her ophthalmologist.  She continues following with her ophthalmologist.  Pilocarpine was tolerated but provided no benefit so she stopped it with no change in symptoms; now on Evoxac and she finds that once daily is sufficient to control her dry " mouth.  Good dentition and follows regularly with a dentist.  No recent cavities.  - Continue Evoxac 30mg daily    - Labs in 1 year: CBC, CMP, UA, Uprotein:creatinine    2. Pulmonary Embolism: Diagnosed 12/2015 and is on anticoagulation.  Followed by Dr. Leon (heme/onc) and Dr. Rendon (heme/onc)    3.  Dorsal midfoot pain bilaterally: Degenerative in nature.  Previously advised stiff soled shoes with good arch support and if no improvement then to consider seeing podiatry.    4.  Left parotid gland swelling: Based on visual inspection I believe that this is the parotid gland, but cannot rule out other etiologies.  Reportedly has had similar episodes in the past that have improved with warm compresses so she is going to repeat this and if not resolved then to see ENT.  - ENT referral    # Note that this is a virtual visit to reduce the risk of COVID-19 exposure during this current pandemic.      Thank you for involving me in the care of the patient    Return to clinic: 1 year, earlier PRN    HPI   Jesicaaustin Mooney is a 45 year old female with history of pulmonary embolism and obesity who presents for f/u of Sjogren's Syndrome.     Today, 11/30/2020: she reports a painless left jaw mass that was noticed by her dentist.  She is not sure how long the mass has actually been present there.  She notes that Evoxac is used once daily and that controls the dry mouth.  Continues to have good dentition.  No weight loss, night sweats, or worsening fatigue.    Denies fevers, chills, nausea, vomiting, constipation, diarrhea. No abdominal pain. No chest pain/pressure, palpitations, or shortness of breath. No oral or nasal sores. No neck pain. No rash.      Tobacco: None  EtOH: None  Drugs: None    ROS   GEN: No fevers, chills, or night sweats. No weight loss.   SKIN: No itching, rashes, sores  HEENT:  No oral or nasal ulcers.  See HPI  CV: No chest pain, pressure, palpitations, or dyspnea on exertion.  PULM: No SOB, wheeze,  "cough.  GI: No nausea, vomiting, constipation, diarrhea. No blood in stool. No abdominal pain.  : No blood in urine.  MSK: See HPI.  NEURO: No numbness, tingling, or weakness.  EXT: No LE swelling    Active Problem List     Patient Active Problem List   Diagnosis     BMI 40.0-44.9, adult (H)     CARDIOVASCULAR SCREENING; LDL GOAL LESS THAN 160     Other acute pulmonary embolism     Sjogren's syndrome (H)     Screening for cervical cancer     Past Medical History     Past Medical History:   Diagnosis Date     IUD (intrauterine device) in place     Mirena inserted 12/28/15     Past Surgical History     Past Surgical History:   Procedure Laterality Date     BIOPSY  a fews years ago    A mole was removed from by scalp     C ORAL SURGERY PROCEDURE  10/1990    wisdom teeth extracted     Allergy     Allergies   Allergen Reactions     Keflex [Cephalexin Hcl] Hives     Penicillins Hives     Current Medication List     Current Outpatient Medications   Medication Sig     cevimeline (EVOXAC) 30 MG capsule Evoxac 30mg daily x7days, then 30mg twice daily x7days, then 30mg three times daily thereafter.     Doxylamine Succinate, Sleep, (SLEEP AID PO)      levonorgestrel (MIRENA) 20 MCG/24HR IUD 1 each by Intrauterine route once     propylene glycol (SYSTANE BALANCE) 0.6 % SOLN ophthalmic solution Place 1 drop into both eyes 2 times daily \"Systane Balance\"     rivaroxaban ANTICOAGULANT (XARELTO ANTICOAGULANT) 20 MG TABS tablet TAKE 1 TABLET(20 MG) BY MOUTH DAILY WITH DINNER     No current facility-administered medications for this visit.          Social History   See HPI    Family History     Family History   Problem Relation Age of Onset     Eye Disorder Mother         cataracts      Rheumatoid Arthritis Mother      Cancer - colorectal Paternal Grandmother         ?     Thyroid Disease Father      Breast Cancer Maternal Aunt         diagnosed in 30s (mother's 1/2 sister)     Cancer No family hx of      Diabetes No family hx of  " "    Hypertension No family hx of      Cerebrovascular Disease No family hx of      Macular Degeneration No family hx of      Glaucoma No family hx of      Mother: RA    Physical Exam     Temp Readings from Last 3 Encounters:   11/23/20 96.8  F (36  C) (Tympanic)   09/16/19 98.6  F (37  C) (Oral)   03/12/19 98.4  F (36.9  C) (Oral)     BP Readings from Last 5 Encounters:   11/23/20 127/83   12/02/19 118/72   09/16/19 122/82   03/12/19 128/88   12/03/18 118/78     Pulse Readings from Last 1 Encounters:   11/23/20 76     Resp Readings from Last 1 Encounters:   03/12/19 12     Estimated body mass index is 43.07 kg/m  as calculated from the following:    Height as of 11/23/20: 1.594 m (5' 2.75\").    Weight as of 11/23/20: 109.4 kg (241 lb 3.2 oz).      GEN: NAD. Healthy appearing adult.   HEENT: MMM.  Anicteric, noninjected sclera. No obvious external lesions of the ear and nose. Hearing intact.  Hypertrophy over the left parotid gland  PULM: No increased work of breathing  MSK:  Hands and wrists without swelling. Elbows without swelling. Shoulders with normal ROM. Knees without swelling. Feet without swelling. Joints without overlying erythema.   SKIN: No rash or jaundice seen  PSYCH: Alert. Appropriate.       Labs / Imaging (select studies)       JAN  Recent Labs   Lab Test 05/24/16  0822 04/14/16  1447 01/12/16  1457   AVA  --  10.9* 11.3*   ANAIGG >1:70535  Reference range: <1:40  (Note)  Mixed Homogeneous and Speckled pattern.  INTERPRETIVE INFORMATION: JAN by IFA, IgG  Anti-nuclear antibodies (JAN) are seen in a variety of  systemic rheumatic diseases and are determined by indirect  fluorescence assay (IFA) using HEp-2 substrate with an  IgG-specific conjugate. JAN titers less than or equal to  1:80 have variable relevance while titers greater than or  equal to 1:160 are considered clinically significant. These  antibodies may precede clinical disease onset; however,  healthy individuals and those with advanced " age have been  reported to be positive for JAN. When observed, one of the  five basic patterns is reported: homogeneous,  peripheral/rim, speckled, centromere, or nucleolar. If  cytoplasmic fluorescence is observed, it is noted. IFA  methodology is subjective and has occasionally been shown  to lack sensitivity for anti-SSA/Ro antibodies.  Negative results do not necessarily rule out the presence  of SSc. If clinical nelson spicion remains, consider further  testing for U3-RNP, PM/Scl, or Th/To antibodies associated  with SSc.  Performed by [facility],  [address] [place] [id number] [phone]  [email or web address], Charles Kumar MD, Lab. Director    --   --      RNP/Sm/SSA/SSB  Recent Labs   Lab Test 05/24/16  0822   RNPIGG 0.9   SMIGG <0.2  Negative   Antibody index (AI) values reflect qualitative changes in antibody   concentration that cannot be directly associated with clinical condition or   disease state.     SSAIGG >8.0  Positive   Antibody index (AI) values reflect qualitative changes in antibody   concentration that cannot be directly associated with clinical condition or   disease state.  *   SSBIGG 0.3   SCLIGG <0.2  Negative   Antibody index (AI) values reflect qualitative changes in antibody   concentration that cannot be directly associated with clinical condition or   disease state.       dsDNA  Recent Labs   Lab Test 11/25/16  1512 05/24/16  0822   DNA 5 6     C3/C4  Recent Labs   Lab Test 11/27/17  0846 11/25/16  1512 05/24/16  0822   Z9LSFRE 98 98 99   H0SPOBJ 21 21 19     Antiphospholipid Antibodies  Recent Labs   Lab Test 04/14/16  1447 01/12/16  1457   B2GPG  --  2.1   B2GPM  --  <0.9  Negative     CARG <15.0  Interpretation:  Negative   <15.0  Interpretation:  Negative     DEEPTI <12.5  Interpretation:  Negative   14.7*   LUPINT  --  Negative  (Note)  COMMENTS:  The INR is normal.  APTT ratio is elevated.  Platelet Neutralization is negative.  APTT 1:2 Mix ratio is normal.  DRVVT Screen  ratio is normal.  Thrombin time is normal.  NEGATIVE TEST; A LUPUS ANTICOAGULANT WAS NOT DETECTED IN THIS  SPECIMEN WITHIN THE LIMITS OF THE TESTING REPERTOIRE.  If the clinical picture is strongly suggestive of an antiphospholipid  syndrome, recommend anticardiolipin and beta-2-glycoprotein (IgG and  IgM) antibody tests.  Platelet Neutralization and APTT 1:2 Mix ratio are suggestive of  factor deficiency.  Recommend factors 8, 9, 11 and 12 (factors VIII, IX, XI, and XII)  levels if clinically indicated.  Nimisha Lovell M.D.  [phone]  1/15/2016    INR =  1.06    Reference range: 0.86-1.14  Thrombin Time= 16.5    Reference range: 13.0-19.0 sec    APTT:       Ratio  Patient  =  1.21  1:2 Mix  =  1.05  Reference:  Negative: Less than or equal to 1.16  Positive: Greater than or equal to 1.17    Platelet Neutralization (second s):  PTT Buffer-PTT Platelet Lysate =  0  Reference:  Negative: Less than or equal to 0  Positive: Greater than or equal to 1     DILUTE BRANDY VIPER VENOM TEST:  Screen Ratio = 1.15   Normal is less than 1.21         CBC  Recent Labs   Lab Test 11/23/20  0853 11/25/19  0837 11/26/18  0830   WBC 7.6 7.0 7.3   RBC 4.88 5.13 4.91   HGB 15.0 15.7 15.1   HCT 43.6 46.9 44.4   MCV 89 91 90   RDW 12.7 12.8 12.6    248 246   MCH 30.7 30.6 30.8   MCHC 34.4 33.5 34.0   NEUTROPHIL 48.2 47.9 44.9   LYMPH 31.7 33.7 39.3   MONOCYTE 12.3 9.7 9.4   EOSINOPHIL 7.4 8.3 6.1   BASOPHIL 0.4 0.4 0.3   ANEU 3.7 3.3 3.3   ALYM 2.4 2.4 2.9   CARLY 0.9 0.7 0.7   AEOS 0.6 0.6 0.4   ABAS 0.0 0.0 0.0     CMP  Recent Labs   Lab Test 11/23/20  0853 11/25/19  0837 11/26/18  0830 11/27/17  0846 11/25/16  1512 05/24/16  0822    137 138 137 139 138   POTASSIUM 4.0 4.2 4.1 4.0 3.8 4.0   CHLORIDE 106 108 106 105 105 106   CO2 29 28 27 29 30 26   ANIONGAP 3 1* 5 3 4 6   GLC 89 98 96 88 97 92   BUN 14 13 14 14 12 14   CR 0.84 0.78 0.81 0.90 0.98 0.78   GFRESTIMATED 83 >90 77 68 62 81   GFRESTBLACK >90 >90 >90 82  76 >90   GFR Calc     BROOKS 9.0 9.6 9.3 9.2 8.9 9.1   BILITOTAL 0.6 0.4 0.3 0.4 0.4 0.5   ALBUMIN 3.4 3.6 3.6 3.8 3.6 3.4   PROTTOTAL 7.4 7.9 8.1 8.5 8.1 7.8   ALKPHOS 69 61 63 65 66 63   AST 21 22 25 26 30 28   ALT 46 36 35 42 56* 38     Calcium/VitaminD  Recent Labs   Lab Test 11/23/20  0853 11/25/19  0837 11/26/18  0830   BROOKS 9.0 9.6 9.3     ESR/CRP  Recent Labs   Lab Test 11/27/17  0846 11/25/16  1512 05/24/16  0822   SED 15 23* 17   CRP <2.9 <2.9 <2.9     CK/Aldolase  Recent Labs   Lab Test 11/25/16  1512 05/24/16  0822   CKT 96 72     TSH/T4  Recent Labs   Lab Test 08/26/13  0750   TSH 1.71     Hepatitis C  Recent Labs   Lab Test 11/23/20  0853   HCVAB Nonreactive     HIV Screening  Recent Labs   Lab Test 11/23/20  0853   HIAGAB Nonreactive     UA  Recent Labs   Lab Test 11/23/20  0906 11/25/19  0840 11/26/18  0838   COLOR Yellow Yellow Yellow   APPEARANCE Clear Clear Clear   URINEGLC Negative Negative Negative   URINEBILI Negative Negative Negative   SG 1.015 1.015 1.010   URINEPH 6.0 6.5 6.5   PROTEIN Negative Negative Negative   UROBILINOGEN 0.2 0.2 0.2   NITRITE Negative Negative Negative   UBLD Small* Small* Trace*   LEUKEST Trace* Negative Trace*   WBCU 0 - 5 0 - 5 0 - 5   RBCU O - 2 O - 2 2-5*   SQUAMOUSEPI Moderate* Moderate* Few   BACTERIA  --  Few*  --    MUCOUS Present*  --   --      Urine Microscopic  Recent Labs   Lab Test 11/23/20  0906 11/25/19  0840 11/26/18  0838   WBCU 0 - 5 0 - 5 0 - 5   RBCU O - 2 O - 2 2-5*   SQUAMOUSEPI Moderate* Moderate* Few   BACTERIA  --  Few*  --    MUCOUS Present*  --   --      Urine Protein  Recent Labs   Lab Test 11/23/20  0906 11/25/19  0840 11/26/18  0838   UTP <0.05 0.06 <0.05   UTPG Unable to calculate due to low value 0.07 Unable to calculate due to low value   RR 98 81 46       Immunization History     Immunization History   Administered Date(s) Administered     Influenza (IIV3) PF 10/02/2012     Influenza Intranasal Vaccine 4 valent  10/11/2013, 10/16/2014, 10/11/2016     Influenza Vaccine IM > 6 months Valent IIV4 10/05/2015, 10/13/2017, 10/15/2018, 09/16/2019     Pneumo Conj 13-V (2010&after) 03/12/2019     Pneumococcal 23 valent 09/03/2019     TDAP Vaccine (Adacel) 12/03/2018     Tdap (Adacel,Boostrix) 01/17/2008     Twinrix A/B 12/17/2012, 01/16/2013, 06/18/2013     Zoster vaccine recombinant adjuvanted (SHINGRIX) 03/12/2019, 09/03/2019          Chart documentation done in part with Dragon Voice recognition Software. Although reviewed after completion, some word and grammatical error may remain.      Video-Visit Details    Type of service:  Video Visit    Video Start Time: 11:44 AM  Video End Time: 11:59 AM    Originating Location (pt. Location): [place], [place]    Distant Location (provider location):  Home    Platform used for Video Visit: Eugenia Donato MD

## 2020-12-01 ENCOUNTER — OFFICE VISIT (OUTPATIENT)
Dept: OPTOMETRY | Facility: CLINIC | Age: 45
End: 2020-12-01
Payer: COMMERCIAL

## 2020-12-01 DIAGNOSIS — H04.123 DRY EYES: ICD-10-CM

## 2020-12-01 DIAGNOSIS — H52.13 MYOPIA OF BOTH EYES: Primary | ICD-10-CM

## 2020-12-01 DIAGNOSIS — M35.00 SJOGREN'S SYNDROME, WITH UNSPECIFIED ORGAN INVOLVEMENT (H): ICD-10-CM

## 2020-12-01 PROCEDURE — 92015 DETERMINE REFRACTIVE STATE: CPT | Performed by: OPTOMETRIST

## 2020-12-01 PROCEDURE — 92014 COMPRE OPH EXAM EST PT 1/>: CPT | Performed by: OPTOMETRIST

## 2020-12-01 RX ORDER — CEVIMELINE HYDROCHLORIDE 30 MG/1
30 CAPSULE ORAL DAILY
Qty: 90 CAPSULE | Refills: 3 | Status: SHIPPED | OUTPATIENT
Start: 2020-12-01 | End: 2021-11-29

## 2020-12-01 ASSESSMENT — REFRACTION_WEARINGRX
OD_CYLINDER: +1.50
OS_CYLINDER: +1.50
OS_AXIS: 075
OS_CYLINDER: +1.75
OD_CYLINDER: +1.25
OS_AXIS: 085
OS_SPHERE: -9.00
OS_AXIS: 085
SPECS_TYPE: SVL
OD_SPHERE: -9.75
OD_AXIS: 070
OS_AXIS: 080
SPECS_TYPE: SVL DISTANCE ONLY
OS_CYLINDER: +1.50
OS_HBASE: BASE DOWN
OS_SPHERE: -9.00
SPECS_TYPE: SVL
OS_CYLINDER: +1.50
SPECS_TYPE: SVL
OD_CYLINDER: +1.25
OS_SPHERE: -9.00
OD_AXIS: 070
OD_AXIS: 062
OS_HBASE: DOWN
OD_SPHERE: -9.75
OD_CYLINDER: +1.25
OS_HPRISM: 1
OS_HPRISM: 1
OD_SPHERE: -9.75
OS_SPHERE: -9.00
OD_SPHERE: -9.75
OD_AXIS: 070

## 2020-12-01 ASSESSMENT — KERATOMETRY
OS_AXISANGLE2_DEGREES: 165
OS_K2POWER_DIOPTERS: 46.75
OS_K1POWER_DIOPTERS: 45.00
OD_K2POWER_DIOPTERS: 46.50
OD_K1POWER_DIOPTERS: 44.75
OD_AXISANGLE2_DEGREES: 165

## 2020-12-01 ASSESSMENT — EXTERNAL EXAM - LEFT EYE: OS_EXAM: NORMAL

## 2020-12-01 ASSESSMENT — REFRACTION_MANIFEST
OS_AXIS: 070
OS_AXIS: 085
OD_CYLINDER: +1.75
OS_SPHERE: -9.75
OD_SPHERE: -10.00
OS_ADD: +1.25
OD_CYLINDER: +1.25
OD_ADD: +1.25
OD_SPHERE: -10.25
OS_CYLINDER: +1.75
OD_AXIS: 077
METHOD_AUTOREFRACTION: 1
OS_CYLINDER: +1.50
OD_AXIS: 060
OS_SPHERE: -9.75

## 2020-12-01 ASSESSMENT — VISUAL ACUITY
OS_CC+: -1
OD_CC+: -1
OD_CC: 20/30
CORRECTION_TYPE: GLASSES
OD_CC: 20/30-3
OS_CC: 20/30
METHOD: SNELLEN - LINEAR
OS_CC: 20/25

## 2020-12-01 ASSESSMENT — CONF VISUAL FIELD
OD_NORMAL: 1
METHOD: COUNTING FINGERS
OS_NORMAL: 1

## 2020-12-01 ASSESSMENT — CUP TO DISC RATIO
OS_RATIO: 0.2
OD_RATIO: 0.2

## 2020-12-01 ASSESSMENT — EXTERNAL EXAM - RIGHT EYE: OD_EXAM: NORMAL

## 2020-12-01 ASSESSMENT — TONOMETRY
OD_IOP_MMHG: 12
IOP_METHOD: APPLANATION
OS_IOP_MMHG: 12

## 2020-12-01 ASSESSMENT — SLIT LAMP EXAM - LIDS
COMMENTS: NORMAL
COMMENTS: NORMAL

## 2020-12-01 NOTE — PATIENT INSTRUCTIONS
Fill glasses prescription  Allow 2 weeks to adapt to change in glasses  Continue use of warm compress twice a day   Use over the counter artificial tears 2 times a day (Systane Balance )  Use nonpreserved artificial tear in vials if discomfort and need to use drops more than four times a day   Return in 1 year for eye exam    Minnie Akers, OD  [phone]

## 2020-12-01 NOTE — PROGRESS NOTES
Chief Complaint   Patient presents with     Annual Eye Exam     Annual Eye Exam          Last Eye Exam: 3/4/19  Dilated Previously: Yes    What are you currently using to see?  glasses       Distance Vision Acuity: Satisfied with vision, no changes     Near Vision Acuity: Has started to have some difficulty with some near vision tasks pushes phone away      Eye Comfort: good  Do you use eye drops? : Yes: Uses her drops Systane Balance  twice a day  daily to twice a day , compress am and pm  Sometimes three times a day , uses tears in vials for use >4 x a day- not needed often     Occupation or Hobbies: [employer] , working from home     Liudmila Apple Optometric Assistant           Medical, surgical and family histories reviewed and updated 12/1/2020.       OBJECTIVE: See Ophthalmology exam    ASSESSMENT:    ICD-10-CM    1. Myopia of both eyes  H52.13 EYE EXAM (SIMPLE-NONBILLABLE)     REFRACTION   2. Sjogren's syndrome, with unspecified organ involvement (H)  M35.00 EYE EXAM (SIMPLE-NONBILLABLE)     REFRACTION   3. Dry eyes  H04.123 EYE EXAM (SIMPLE-NONBILLABLE)     REFRACTION      PLAN:     Patient Instructions   Fill glasses prescription  Allow 2 weeks to adapt to change in glasses  Continue use of warm compress twice a day   Use over the counter artificial tears 2 times a day (Systane Balance )  Use nonpreserved artificial tear in vials if discomfort and need to use drops more than four times a day   Return in 1 year for eye exam    Minnie Akers, OD  [phone]

## 2020-12-07 NOTE — PROGRESS NOTES
"I am seeing this patient in consultation for mass of parotid gland at the request of the provider Dr. Dylan Donato.    Chief Complaint - left parotid swelling    History of Present Illness - Jesica Mooney is a 45 year old female with swelling of the left gland. It has been present for 2 weeks. It seems to be getting bigger. It is not painful. She had one episode of painful swelling about 10 years ago. No redness. Symptoms do not worsen with eating. The patient denies any facial numbness, weakness. No pain with chewing. The patient is a never. The patient has tried nothing. She has a h/o Sjogren's, diagnosis 6/2016, but she has had symptoms prior to that.    Past Medical History -   Patient Active Problem List   Diagnosis     BMI 40.0-44.9, adult (H)     CARDIOVASCULAR SCREENING; LDL GOAL LESS THAN 160     Other acute pulmonary embolism     Sjogren's syndrome (H)     Screening for cervical cancer       Current Medications -   Current Outpatient Medications:      cevimeline (EVOXAC) 30 MG capsule, Take 1 capsule (30 mg) by mouth daily, Disp: 90 capsule, Rfl: 3     Doxylamine Succinate, Sleep, (SLEEP AID PO), , Disp: , Rfl:      levonorgestrel (MIRENA) 20 MCG/24HR IUD, 1 each by Intrauterine route once, Disp: , Rfl:      propylene glycol (SYSTANE BALANCE) 0.6 % SOLN ophthalmic solution, Place 1 drop into both eyes 2 times daily \"Systane Balance\", Disp: 1 Bottle, Rfl:      rivaroxaban ANTICOAGULANT (XARELTO ANTICOAGULANT) 20 MG TABS tablet, TAKE 1 TABLET(20 MG) BY MOUTH DAILY WITH DINNER, Disp: 90 tablet, Rfl: 3    Allergies -   Allergies   Allergen Reactions     Keflex [Cephalexin Hcl] Hives     Penicillins Hives       Social History -   Social History     Socioeconomic History     Marital status:      Spouse name: Not on file     Number of children: Not on file     Years of education: Not on file     Highest education level: Not on file   Occupational History     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   Lifestyle     Physical activity     Days per week: Not on file     Minutes per session: Not on file     Stress: Not on file   Relationships     Social connections     Talks on phone: Not on file     Gets together: Not on file     Attends Amish service: Not on file     Active member of club or organization: Not on file     Attends meetings of clubs or organizations: Not on file     Relationship status: Not on file     Intimate partner violence     Fear of current or ex partner: Not on file     Emotionally abused: Not on file     Physically abused: Not on file     Forced sexual activity: Not on file   Other Topics Concern     Parent/sibling w/ CABG, MI or angioplasty before 65F 55M? No      Service Yes     Blood Transfusions No     Caffeine Concern No     Occupational Exposure No     Hobby Hazards No     Sleep Concern No     Stress Concern No     Weight Concern No     Special Diet No     Back Care No     Exercise Yes     Bike Helmet No     Seat Belt Yes     Self-Exams Yes   Social History Narrative     Not on file       Family History -   Family History   Problem Relation Age of Onset     Eye Disorder Mother         cataracts      Rheumatoid Arthritis Mother      Cancer - colorectal Paternal Grandmother         ?     Thyroid Disease Father      Breast Cancer Maternal Aunt         diagnosed in 30s (mother's 1/2 sister)     Cancer No family hx of      Diabetes No family hx of   "    Hypertension No family hx of      Cerebrovascular Disease No family hx of      Macular Degeneration No family hx of      Glaucoma No family hx of        Review of Systems - As per HPI and PMHx, otherwise 10+ comprehensive system review is negative.    Physical Exam  Pulse 73   Ht 1.6 m (5' 3\")   SpO2 96%   BMI 42.73 kg/m    General - The patient is in no distress.  Alert and oriented x3, answers questions and cooperates with examination appropriately.   Voice and Breathing - The patient was breathing comfortably without the use of accessory muscles. There was no wheezing, stridor, or stertor.  The patients voice was clear and strong.  Head and Face - Normocephalic and atraumatic.    Eyes - Extraocular movements intact. Sclera were not icteric or injected, conjunctiva were pink and moist.  Neurologic - Cranial nerves II-XII are grossly intact. Specifically, the facial nerve is intact, House-Brackmann grade 1 of 6.   Mouth - Examination of the oral cavity showed pink, healthy oral mucosa. No lesions or ulcerations noted.  The tongue was mobile and protrudes midline.  Oropharynx - The walls of the oropharynx were smooth, symmetric, and had no lesions or ulcerations. The tonsils were 2+ right, 1+ left. No masses. The uvula was midline and the palate raised symmetrically.   Neck - The left parotid gland has a 2.5-3 cm likely mass of the superficial gland just anterior to the tragus and deep to the lobule. There maybe a separate small nodule just posterior and inferior to this, possibly a lymph node. No tenderness. The rest of the occipital, submental, submandibular, internal jugular chain, and supraclavicular nodes did not demonstrate any abnormal lymph nodes or masses. Palpation of the thyroid was soft and smooth, with no nodules or goiter appreciated.  The trachea was midline.  Ears - The auricles appeared normal. The external auditory canals were nonedematous and nonerythematous. The tympanic membranes are " normal in appearance, bony landmarks are intact.  No retraction, perforation, or masses.  No fluid or purulence was seen in the external canal or the middle ear.   Cardiovascular - carotid pulses are 2+ bilaterally, regular rhythm        A/P - Jesica TAMIKA Mooney is a 45 year old female with a left parotid swelling, probably a mass. She has Sjogren's so the differential is broad and cyst or sialolithiasis is also possible. However, we discussed neoplasm, benign or malignancy. Lymphoma is possible given the Sjogren's disease. I recommend a CT neck. This may need biopsy and/or removal. I'll call with results and next steps.     Cirilo Hernandez MD  Otolaryngology  [facility]

## 2020-12-08 ENCOUNTER — OFFICE VISIT (OUTPATIENT)
Dept: OTOLARYNGOLOGY | Facility: CLINIC | Age: 45
End: 2020-12-08
Payer: COMMERCIAL

## 2020-12-08 VITALS — HEIGHT: 63 IN | OXYGEN SATURATION: 96 % | BODY MASS INDEX: 42.73 KG/M2 | HEART RATE: 73 BPM

## 2020-12-08 DIAGNOSIS — K11.8 MASS OF LEFT PAROTID GLAND: Primary | ICD-10-CM

## 2020-12-08 PROCEDURE — 99204 OFFICE O/P NEW MOD 45 MIN: CPT | Performed by: OTOLARYNGOLOGY

## 2020-12-08 NOTE — LETTER
"    12/8/2020         RE: Jesica Mooney  [address] [place] [id number]        Dear Colleague,    Thank you for referring your patient, Jesica Mooney, to the [facility]. Please see a copy of my visit note below.    I am seeing this patient in consultation for mass of parotid gland at the request of the provider Dr. Dylan Donato.    Chief Complaint - left parotid swelling    History of Present Illness - Jesica Mooney is a 45 year old female with swelling of the left gland. It has been present for 2 weeks. It seems to be getting bigger. It is not painful. She had one episode of painful swelling about 10 years ago. No redness. Symptoms do not worsen with eating. The patient denies any facial numbness, weakness. No pain with chewing. The patient is a never. The patient has tried nothing. She has a h/o Sjogren's, diagnosis 6/2016, but she has had symptoms prior to that.    Past Medical History -   Patient Active Problem List   Diagnosis     BMI 40.0-44.9, adult (H)     CARDIOVASCULAR SCREENING; LDL GOAL LESS THAN 160     Other acute pulmonary embolism     Sjogren's syndrome (H)     Screening for cervical cancer       Current Medications -   Current Outpatient Medications:      cevimeline (EVOXAC) 30 MG capsule, Take 1 capsule (30 mg) by mouth daily, Disp: 90 capsule, Rfl: 3     Doxylamine Succinate, Sleep, (SLEEP AID PO), , Disp: , Rfl:      levonorgestrel (MIRENA) 20 MCG/24HR IUD, 1 each by Intrauterine route once, Disp: , Rfl:      propylene glycol (SYSTANE BALANCE) 0.6 % SOLN ophthalmic solution, Place 1 drop into both eyes 2 times daily \"Systane Balance\", Disp: 1 Bottle, Rfl:      rivaroxaban ANTICOAGULANT (XARELTO ANTICOAGULANT) 20 MG TABS tablet, TAKE 1 TABLET(20 MG) BY MOUTH DAILY WITH DINNER, Disp: 90 tablet, Rfl: 3    Allergies -   Allergies   Allergen Reactions     Keflex [Cephalexin Hcl] Hives     Penicillins Hives       Social History -   Social History "     Socioeconomic History     Marital status:      Spouse name: Not on file     Number of children: Not on file     Years of education: Not on file     Highest education level: Not on file   Occupational History     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   Lifestyle     Physical activity     Days per week: Not on file     Minutes per session: Not on file     Stress: Not on file   Relationships     Social connections     Talks on phone: Not on file     Gets together: Not on file     Attends Bahai service: Not on file     Active member of club or organization: Not on file     Attends meetings of clubs or organizations: Not on file     Relationship status: Not on file     Intimate partner violence     Fear of current or ex partner: Not on file     Emotionally abused: Not on file     Physically abused: Not on file     Forced sexual activity: Not on file   Other Topics Concern     Parent/sibling w/ CABG, MI or angioplasty before 65F 55M? No      Service Yes     Blood Transfusions No     Caffeine Concern No     Occupational Exposure No     Hobby Hazards No     Sleep Concern No     Stress Concern No     Weight Concern No     Special Diet No     Back Care No     Exercise Yes     Bike Helmet No     Seat Belt Yes     Self-Exams Yes   Social History Narrative     Not on file       Family History -   Family History   Problem Relation Age of Onset     Eye Disorder Mother         cataracts   "    Rheumatoid Arthritis Mother      Cancer - colorectal Paternal Grandmother         ?     Thyroid Disease Father      Breast Cancer Maternal Aunt         diagnosed in 30s (mother's 1/2 sister)     Cancer No family hx of      Diabetes No family hx of      Hypertension No family hx of      Cerebrovascular Disease No family hx of      Macular Degeneration No family hx of      Glaucoma No family hx of        Review of Systems - As per HPI and PMHx, otherwise 10+ comprehensive system review is negative.    Physical Exam  Pulse 73   Ht 1.6 m (5' 3\")   SpO2 96%   BMI 42.73 kg/m    General - The patient is in no distress.  Alert and oriented x3, answers questions and cooperates with examination appropriately.   Voice and Breathing - The patient was breathing comfortably without the use of accessory muscles. There was no wheezing, stridor, or stertor.  The patients voice was clear and strong.  Head and Face - Normocephalic and atraumatic.    Eyes - Extraocular movements intact. Sclera were not icteric or injected, conjunctiva were pink and moist.  Neurologic - Cranial nerves II-XII are grossly intact. Specifically, the facial nerve is intact, House-Brackmann grade 1 of 6.   Mouth - Examination of the oral cavity showed pink, healthy oral mucosa. No lesions or ulcerations noted.  The tongue was mobile and protrudes midline.  Oropharynx - The walls of the oropharynx were smooth, symmetric, and had no lesions or ulcerations. The tonsils were 2+ right, 1+ left. No masses. The uvula was midline and the palate raised symmetrically.   Neck - The left parotid gland has a 2.5-3 cm likely mass of the superficial gland just anterior to the tragus and deep to the lobule. There maybe a separate small nodule just posterior and inferior to this, possibly a lymph node. No tenderness. The rest of the occipital, submental, submandibular, internal jugular chain, and supraclavicular nodes did not demonstrate any abnormal lymph nodes or " masses. Palpation of the thyroid was soft and smooth, with no nodules or goiter appreciated.  The trachea was midline.  Ears - The auricles appeared normal. The external auditory canals were nonedematous and nonerythematous. The tympanic membranes are normal in appearance, bony landmarks are intact.  No retraction, perforation, or masses.  No fluid or purulence was seen in the external canal or the middle ear.   Cardiovascular - carotid pulses are 2+ bilaterally, regular rhythm        A/P - Jesica TAMIKA Mooney is a 45 year old female with a left parotid swelling, probably a mass. She has Sjogren's so the differential is broad and cyst or sialolithiasis is also possible. However, we discussed neoplasm, benign or malignancy. Lymphoma is possible given the Sjogren's disease. I recommend a CT neck. This may need biopsy and/or removal. I'll call with results and next steps.     Cirilo Hernandez MD  Otolaryngology  [facility]        Again, thank you for allowing me to participate in the care of your patient.        Sincerely,        Cirilo Hernandez MD

## 2020-12-10 ENCOUNTER — ANCILLARY PROCEDURE (OUTPATIENT)
Dept: CT IMAGING | Facility: CLINIC | Age: 45
End: 2020-12-10
Attending: OTOLARYNGOLOGY
Payer: COMMERCIAL

## 2020-12-10 DIAGNOSIS — K11.8 MASS OF LEFT PAROTID GLAND: ICD-10-CM

## 2020-12-10 PROCEDURE — 70491 CT SOFT TISSUE NECK W/DYE: CPT | Mod: TC

## 2020-12-10 RX ORDER — IOPAMIDOL 755 MG/ML
80 INJECTION, SOLUTION INTRAVASCULAR ONCE
Status: COMPLETED | OUTPATIENT
Start: 2020-12-10 | End: 2020-12-10

## 2020-12-10 RX ADMIN — IOPAMIDOL 80 ML: 755 INJECTION, SOLUTION INTRAVASCULAR at 08:20

## 2020-12-11 ENCOUNTER — TELEPHONE (OUTPATIENT)
Dept: OTOLARYNGOLOGY | Facility: CLINIC | Age: 45
End: 2020-12-11

## 2020-12-11 NOTE — TELEPHONE ENCOUNTER
I spoke with Gretchen on the phone and gave her her CT results.  She has a left parotid mass.  She does have a history of Sjogren's disease.  It is is unlikely a simple cyst or old fluid collection or abscess.  It seems more likely a neoplasm.  We discussed possibilities including a benign parotid neoplasm, malignancy such as lymphoma and she has a slightly high risk of this given her Sjogren's disease, or other malignancy.  Given the size and location I think this would be amenable to core needle biopsy in clinic.  We have set her up to return to [place] clinic on Wednesday, December 16 for core needle biopsy.  I will also send part of the specimen in [facility] for lymphoma studies.     All questions were answered.

## 2020-12-14 ENCOUNTER — HEALTH MAINTENANCE LETTER (OUTPATIENT)
Age: 45
End: 2020-12-14

## 2020-12-16 ENCOUNTER — OFFICE VISIT (OUTPATIENT)
Dept: OTOLARYNGOLOGY | Facility: CLINIC | Age: 45
End: 2020-12-16
Payer: COMMERCIAL

## 2020-12-16 VITALS
HEART RATE: 68 BPM | OXYGEN SATURATION: 98 % | DIASTOLIC BLOOD PRESSURE: 75 MMHG | SYSTOLIC BLOOD PRESSURE: 104 MMHG | RESPIRATION RATE: 16 BRPM

## 2020-12-16 DIAGNOSIS — K11.8 MASS OF LEFT PAROTID GLAND: Primary | ICD-10-CM

## 2020-12-16 PROCEDURE — 88189 FLOWCYTOMETRY/READ 16 & >: CPT | Performed by: PATHOLOGY

## 2020-12-16 PROCEDURE — 99213 OFFICE O/P EST LOW 20 MIN: CPT | Performed by: OTOLARYNGOLOGY

## 2020-12-16 PROCEDURE — 88307 TISSUE EXAM BY PATHOLOGIST: CPT | Performed by: PATHOLOGY

## 2020-12-16 RX ORDER — CLINDAMYCIN HCL 300 MG
300 CAPSULE ORAL 3 TIMES DAILY
Qty: 15 CAPSULE | Refills: 0 | Status: SHIPPED | OUTPATIENT
Start: 2020-12-16 | End: 2020-12-21

## 2020-12-16 ASSESSMENT — PAIN SCALES - GENERAL: PAINLEVEL: NO PAIN (0)

## 2020-12-16 NOTE — PROGRESS NOTES
"Chief Complaint - left parotid mass    History of Present Illness - Jesica Mooney is a 45 year old female who returns with a left parotid mass. It has been present for 2 weeks. It seems to be getting bigger. It is not painful. She had one episode of painful swelling about 10 years ago. No redness. Symptoms do not worsen with eating. The patient denies any facial numbness, weakness. No pain with chewing. The patient is a never. The patient has tried nothing. She has a h/o Sjogren's, diagnosis 6/2016, but she has had symptoms prior to that. CT neck 12/10/2020 images reviewed showing a left parotid mass measuring approximately 2.7 x 2.4 x 2.5 cm. No lymphadenopathy. She returns for biopsy of the mass    Past Medical History -   Patient Active Problem List   Diagnosis     BMI 40.0-44.9, adult (H)     CARDIOVASCULAR SCREENING; LDL GOAL LESS THAN 160     Other acute pulmonary embolism     Sjogren's syndrome (H)     Screening for cervical cancer       Current Medications -   Current Outpatient Medications:      cevimeline (EVOXAC) 30 MG capsule, Take 1 capsule (30 mg) by mouth daily, Disp: 90 capsule, Rfl: 3     Doxylamine Succinate, Sleep, (SLEEP AID PO), , Disp: , Rfl:      levonorgestrel (MIRENA) 20 MCG/24HR IUD, 1 each by Intrauterine route once, Disp: , Rfl:      propylene glycol (SYSTANE BALANCE) 0.6 % SOLN ophthalmic solution, Place 1 drop into both eyes 2 times daily \"Systane Balance\", Disp: 1 Bottle, Rfl:      rivaroxaban ANTICOAGULANT (XARELTO ANTICOAGULANT) 20 MG TABS tablet, TAKE 1 TABLET(20 MG) BY MOUTH DAILY WITH DINNER, Disp: 90 tablet, Rfl: 3    Allergies -   Allergies   Allergen Reactions     Keflex [Cephalexin Hcl] Hives     Penicillins Hives       Social History -   Social History     Socioeconomic History     Marital status:      Spouse name: Not on file     Number of children: Not on file     Years of education: Not on file     Highest education level: Not on file   Occupational History    "  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   Lifestyle     Physical activity     Days per week: Not on file     Minutes per session: Not on file     Stress: Not on file   Relationships     Social connections     Talks on phone: Not on file     Gets together: Not on file     Attends Moravian service: Not on file     Active member of club or organization: Not on file     Attends meetings of clubs or organizations: Not on file     Relationship status: Not on file     Intimate partner violence     Fear of current or ex partner: Not on file     Emotionally abused: Not on file     Physically abused: Not on file     Forced sexual activity: Not on file   Other Topics Concern     Parent/sibling w/ CABG, MI or angioplasty before 65F 55M? No      Service Yes     Blood Transfusions No     Caffeine Concern No     Occupational Exposure No     Hobby Hazards No     Sleep Concern No     Stress Concern No     Weight Concern No     Special Diet No     Back Care No     Exercise Yes     Bike Helmet No     Seat Belt Yes     Self-Exams Yes   Social History Narrative     Not on file       Family History -   Family History   Problem Relation Age of Onset     Eye Disorder Mother         cataracts      Rheumatoid Arthritis Mother      Cancer - colorectal Paternal Grandmother         ?     Thyroid Disease Father      Breast Cancer Maternal Aunt         diagnosed in 30s (mother's 1/2 sister)     Cancer No family hx of       Diabetes No family hx of      Hypertension No family hx of      Cerebrovascular Disease No family hx of      Macular Degeneration No family hx of      Glaucoma No family hx of      Review of Systems - As per HPI and PMHx, otherwise 7 system review of the head and neck is negative.    Physical Exam  General - The patient is in no distress.  Alert and oriented x3, answers questions and cooperates with examination appropriately.   Voice and Breathing - The patient was breathing comfortably without the use of accessory muscles. There was no wheezing, stridor, or stertor.  The patients voice was clear and strong.  Head and Face - Normocephalic and atraumatic.    Eyes - Extraocular movements intact. Sclera were not icteric or injected, conjunctiva were pink and moist.  Neurologic - Cranial nerves II-XII are grossly intact. Specifically, the facial nerve is intact, House-Brackmann grade 1 of 6.   Mouth - Examination of the oral cavity showed pink, healthy oral mucosa. No lesions or ulcerations noted.  The tongue was mobile and protrudes midline.  Oropharynx - The walls of the oropharynx were smooth, symmetric, and had no lesions or ulcerations. The tonsils were 2+ right, 1+ left. No masses. The uvula was midline and the palate raised symmetrically.   Neck - The left parotid gland has a 2.5-3 cm mass of the superficial gland just anterior to the tragus and deep to the lobule. No tenderness. The rest of the occipital, submental, submandibular, internal jugular chain, and supraclavicular nodes did not demonstrate any abnormal lymph nodes or masses.       Procedure - Core needle biopsy left superficial parotid gland    I explained the risks, benefits, and alternatives to core needle biopsy of the left parotid gland mass including bleeding, infection, nerve injury and sequelae, possibility for no diagnosis and the need for additional procedures. The patient agreed and signed the consent. I injected a small amount of 1%  lidocaine 1:100,000 epinephrine into the skin overlying the left parotid mass. I used an 16 gauge core needle biopsy. I inserted this to a depth of approximately 7-8 mm into the left parotid mass. I took a total of 5 core biopsies, 2 for RPMI and 3 for permanent pathology in formalin. After the first biopsy a significant amount of turbid fluid poured out and the mass decompressed making this consistent with a cystic mass. I also angled the core posterior and inferior where there was more firmness, possibly an adjacent lymph node. There was minimal bleeding that stopped with pressure.         A/P - Jesicaaustin Mooney is a 45 year old female with a left parotid mass. She has Sjogren's disease. I took core needle biopsies today. The mass was cystic and turbid fluid poured out with the first core biopsy. It decompressed nicely. I will call with the pathology and next steps which may include observation with f/u U/S or possibly left superficial parotidectomy.     Cirilo Hernandez MD  Otolaryngology  [facility]

## 2020-12-16 NOTE — LETTER
"    12/16/2020         RE: Jesica Mooney  [address] [place] [id number]        Dear Colleague,    Thank you for referring your patient, Jesica Mooney, to the [facility]. Please see a copy of my visit note below.    Chief Complaint - left parotid mass    History of Present Illness - Jesica Mooney is a 45 year old female who returns with a left parotid mass. It has been present for 2 weeks. It seems to be getting bigger. It is not painful. She had one episode of painful swelling about 10 years ago. No redness. Symptoms do not worsen with eating. The patient denies any facial numbness, weakness. No pain with chewing. The patient is a never. The patient has tried nothing. She has a h/o Sjogren's, diagnosis 6/2016, but she has had symptoms prior to that. CT neck 12/10/2020 images reviewed showing a left parotid mass measuring approximately 2.7 x 2.4 x 2.5 cm. No lymphadenopathy. She returns for biopsy of the mass    Past Medical History -   Patient Active Problem List   Diagnosis     BMI 40.0-44.9, adult (H)     CARDIOVASCULAR SCREENING; LDL GOAL LESS THAN 160     Other acute pulmonary embolism     Sjogren's syndrome (H)     Screening for cervical cancer       Current Medications -   Current Outpatient Medications:      cevimeline (EVOXAC) 30 MG capsule, Take 1 capsule (30 mg) by mouth daily, Disp: 90 capsule, Rfl: 3     Doxylamine Succinate, Sleep, (SLEEP AID PO), , Disp: , Rfl:      levonorgestrel (MIRENA) 20 MCG/24HR IUD, 1 each by Intrauterine route once, Disp: , Rfl:      propylene glycol (SYSTANE BALANCE) 0.6 % SOLN ophthalmic solution, Place 1 drop into both eyes 2 times daily \"Systane Balance\", Disp: 1 Bottle, Rfl:      rivaroxaban ANTICOAGULANT (XARELTO ANTICOAGULANT) 20 MG TABS tablet, TAKE 1 TABLET(20 MG) BY MOUTH DAILY WITH DINNER, Disp: 90 tablet, Rfl: 3    Allergies -   Allergies   Allergen Reactions     Keflex [Cephalexin Hcl] Hives     Penicillins Hives       Social " History -   Social History     Socioeconomic History     Marital status:      Spouse name: Not on file     Number of children: Not on file     Years of education: Not on file     Highest education level: Not on file   Occupational History     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   Lifestyle     Physical activity     Days per week: Not on file     Minutes per session: Not on file     Stress: Not on file   Relationships     Social connections     Talks on phone: Not on file     Gets together: Not on file     Attends Sabianist service: Not on file     Active member of club or organization: Not on file     Attends meetings of clubs or organizations: Not on file     Relationship status: Not on file     Intimate partner violence     Fear of current or ex partner: Not on file     Emotionally abused: Not on file     Physically abused: Not on file     Forced sexual activity: Not on file   Other Topics Concern     Parent/sibling w/ CABG, MI or angioplasty before 65F 55M? No      Service Yes     Blood Transfusions No     Caffeine Concern No     Occupational Exposure No     Hobby Hazards No     Sleep Concern No     Stress Concern No     Weight Concern No     Special Diet No     Back Care No     Exercise Yes     Bike Helmet No     Seat Belt Yes     Self-Exams Yes   Social History Narrative     Not on file       Family History -   Family History   Problem Relation Age of Onset     Eye  Disorder Mother         cataracts      Rheumatoid Arthritis Mother      Cancer - colorectal Paternal Grandmother         ?     Thyroid Disease Father      Breast Cancer Maternal Aunt         diagnosed in 30s (mother's 1/2 sister)     Cancer No family hx of      Diabetes No family hx of      Hypertension No family hx of      Cerebrovascular Disease No family hx of      Macular Degeneration No family hx of      Glaucoma No family hx of      Review of Systems - As per HPI and PMHx, otherwise 7 system review of the head and neck is negative.    Physical Exam  General - The patient is in no distress.  Alert and oriented x3, answers questions and cooperates with examination appropriately.   Voice and Breathing - The patient was breathing comfortably without the use of accessory muscles. There was no wheezing, stridor, or stertor.  The patients voice was clear and strong.  Head and Face - Normocephalic and atraumatic.    Eyes - Extraocular movements intact. Sclera were not icteric or injected, conjunctiva were pink and moist.  Neurologic - Cranial nerves II-XII are grossly intact. Specifically, the facial nerve is intact, House-Brackmann grade 1 of 6.   Mouth - Examination of the oral cavity showed pink, healthy oral mucosa. No lesions or ulcerations noted.  The tongue was mobile and protrudes midline.  Oropharynx - The walls of the oropharynx were smooth, symmetric, and had no lesions or ulcerations. The tonsils were 2+ right, 1+ left. No masses. The uvula was midline and the palate raised symmetrically.   Neck - The left parotid gland has a 2.5-3 cm mass of the superficial gland just anterior to the tragus and deep to the lobule. No tenderness. The rest of the occipital, submental, submandibular, internal jugular chain, and supraclavicular nodes did not demonstrate any abnormal lymph nodes or masses.       Procedure - Core needle biopsy left superficial parotid gland    I explained the risks, benefits, and alternatives  to core needle biopsy of the left parotid gland mass including bleeding, infection, nerve injury and sequelae, possibility for no diagnosis and the need for additional procedures. The patient agreed and signed the consent. I injected a small amount of 1% lidocaine 1:100,000 epinephrine into the skin overlying the left parotid mass. I used an 16 gauge core needle biopsy. I inserted this to a depth of approximately 7-8 mm into the left parotid mass. I took a total of 5 core biopsies, 2 for RPMI and 3 for permanent pathology in formalin. After the first biopsy a significant amount of turbid fluid poured out and the mass decompressed making this consistent with a cystic mass. I also angled the core posterior and inferior where there was more firmness, possibly an adjacent lymph node. There was minimal bleeding that stopped with pressure.         A/P - Jesica Mooney is a 45 year old female with a left parotid mass. She has Sjogren's disease. I took core needle biopsies today. The mass was cystic and turbid fluid poured out with the first core biopsy. It decompressed nicely. I will call with the pathology and next steps which may include observation with f/u U/S or possibly left superficial parotidectomy.     Cirilo Hernandez MD  Otolaryngology  [facility]        Again, thank you for allowing me to participate in the care of your patient.        Sincerely,        Cirilo Hernandez MD

## 2020-12-16 NOTE — PATIENT INSTRUCTIONS
General Scheduling Information  To schedule your CT/MRI scan, please contact [place] [facility] at [phone]   [address] [place] [address]    To schedule your Surgery, please contact our Specialty Schedulers at [phone]    ENT Clinic Locations Clinic Hours Telephone Number     Nanda Nair  [address] [place] [address]   Tuesday:       8:00am -- 4:00pm    Wednesday:  8:00am - 4:00pm   To schedule an appointment with   Dr. Hernandez,   please contact our   Specialty Scheduling Department at:     [phone]       Nanda Uriarte  [address] [place] [address]   Friday:          8:00am - 4:00pm         Urgent Care Locations Clinic Hours Telephone Numbers     Nanda Steven  [address] [place] [address]     Monday-Friday:     11:00pm - 9:00pm    Saturday-Sunday:  9:00am - 5:00pm   [phone]     Nanda Uriarte  [address] [place] [address]     Monday-Friday:      5:00pm - 9:00pm     Saturday-Sunday:  9:00am - 5:00pm   [phone]

## 2020-12-17 ENCOUNTER — MYC MEDICAL ADVICE (OUTPATIENT)
Dept: OTOLARYNGOLOGY | Facility: CLINIC | Age: 45
End: 2020-12-17

## 2020-12-17 NOTE — TELEPHONE ENCOUNTER
See other mychart encounter from 12/17 for follow up communication. Will close this encounter. Martina Michael RN, BSN Specialty Clinics

## 2020-12-17 NOTE — TELEPHONE ENCOUNTER
Mychart responses from other Mychart encounter on 12/17/2020.   jolie Michael, RN, BSN Specialty Clinics  Yes, I was able to get my medication and I just took my second pill.     No, the swelling & itching has NOT subsided since I left the last message.     There is a very small amount of drainage from the biopsy site.     The actual area is slightly red, but I don't think it's spreading from the biopsy site.     Yes, the site is warm to the touch, but I think at least part of that is because I'm been scratching it because it's itchy.     I do NOT have a fever.     I have not tried icing it or taking Ibuprofen.  I don't think I'm allowed to take Ibuprofen as I'm on Xeralto and I was told the two don't mix.  I took 2 generic allergy pills (from [employer]) this morning around 8:45 AM and again around 12:45.       I will try to take a picture.  Will send as soon as I can.     Thank you, Martina!  -Jimmie Michael, RN, BSN Specialty Clinics

## 2020-12-18 ENCOUNTER — TELEPHONE (OUTPATIENT)
Dept: OTOLARYNGOLOGY | Facility: CLINIC | Age: 45
End: 2020-12-18

## 2020-12-18 LAB
COPATH REPORT: NORMAL
COPATH REPORT: NORMAL

## 2020-12-18 NOTE — TELEPHONE ENCOUNTER
I spoke with patient. She is better. Swelling and itching is better. This may or may not have been a betadine allergy. I added it to her allergy list but put that it was low risk.  Something to pay attention to in the future with Betadine.  Interestingly she had a recent CT neck with contrast and had no allergic response.  Therefore I do not think it is that iodine or contrast allergy.  Lymphoma studies were negative. i'll call with pathology.

## 2020-12-22 DIAGNOSIS — K11.8 MASS OF LEFT PAROTID GLAND: Primary | ICD-10-CM

## 2020-12-22 NOTE — PROGRESS NOTES
Pathology of left parotid core needle biopsy was negative.  A significant amount of fluid came out during the core needle biopsy consistent with a likely cyst.  She has Sjogren's and therefore I think this is likely a simple parotid cyst.  However I want to monitor this to make sure it is not a cystic mass.  Therefore I recommend an ultrasound of the left parotid gland in 6 weeks.  She will notify me sooner if this swells again or becomes painful or infected.

## 2020-12-29 ENCOUNTER — ANCILLARY PROCEDURE (OUTPATIENT)
Dept: ULTRASOUND IMAGING | Facility: CLINIC | Age: 45
End: 2020-12-29
Attending: OTOLARYNGOLOGY
Payer: COMMERCIAL

## 2020-12-29 DIAGNOSIS — K11.8 MASS OF LEFT PAROTID GLAND: ICD-10-CM

## 2020-12-30 ENCOUNTER — TELEPHONE (OUTPATIENT)
Dept: OTOLARYNGOLOGY | Facility: CLINIC | Age: 45
End: 2020-12-30

## 2020-12-30 NOTE — TELEPHONE ENCOUNTER
I spoke with the patient over the phone.  I carefully reviewed her CT neck and her ultrasound images.  This information in combination with a very thin copious fluid collection on core needle biopsy leads me to believe that this is a simple cyst or a lymphoepithelial cyst.  She has Sjogren's and the anatomy of her parotid gland on the CT suggests Sjogren's with a cyst.  I see no adjacent nodule or mass suggesting malignancy or other tumor.  This seems like a thin-walled cyst.  I did explain to the patient there is always a possibility that this could be a cystic tumor in which most of it is fluid-filled but a small rim of it or part of it is a tumor and that the only definitive diagnosis would be surgical excision.  However she prefers observation and I do think that is reasonable given the information we have.  She will return in 3 to 4 months for physical examination with me.  If something changes or worsens including growth, pain, new masses, etc. she will return sooner.

## 2021-02-27 ENCOUNTER — HEALTH MAINTENANCE LETTER (OUTPATIENT)
Age: 46
End: 2021-02-27

## 2021-03-24 ENCOUNTER — IMMUNIZATION (OUTPATIENT)
Dept: FAMILY MEDICINE | Facility: OTHER | Age: 46
End: 2021-03-24
Attending: FAMILY MEDICINE
Payer: COMMERCIAL

## 2021-03-24 PROCEDURE — 0001A PR COVID VAC PFIZER DIL RECON 30 MCG/0.3 ML IM: CPT

## 2021-03-24 PROCEDURE — 91300 PR COVID VAC PFIZER DIL RECON 30 MCG/0.3 ML IM: CPT

## 2021-04-12 ENCOUNTER — IMMUNIZATION (OUTPATIENT)
Dept: FAMILY MEDICINE | Facility: OTHER | Age: 46
End: 2021-04-12
Attending: FAMILY MEDICINE
Payer: COMMERCIAL

## 2021-04-12 PROCEDURE — 91300 PR COVID VAC PFIZER DIL RECON 30 MCG/0.3 ML IM: CPT

## 2021-04-12 PROCEDURE — 0002A PR COVID VAC PFIZER DIL RECON 30 MCG/0.3 ML IM: CPT

## 2021-04-19 DIAGNOSIS — Z79.01 CHRONIC ANTICOAGULATION: Primary | ICD-10-CM

## 2021-04-19 DIAGNOSIS — Z79.01 CHRONIC ANTICOAGULATION: ICD-10-CM

## 2021-04-22 ENCOUNTER — VIRTUAL VISIT (OUTPATIENT)
Dept: HEMATOLOGY | Facility: CLINIC | Age: 46
End: 2021-04-22
Attending: PHYSICIAN ASSISTANT
Payer: COMMERCIAL

## 2021-04-22 ENCOUNTER — OFFICE VISIT (OUTPATIENT)
Dept: OTOLARYNGOLOGY | Facility: CLINIC | Age: 46
End: 2021-04-22
Payer: COMMERCIAL

## 2021-04-22 VITALS
DIASTOLIC BLOOD PRESSURE: 78 MMHG | OXYGEN SATURATION: 99 % | SYSTOLIC BLOOD PRESSURE: 109 MMHG | RESPIRATION RATE: 16 BRPM | HEART RATE: 75 BPM

## 2021-04-22 VITALS — WEIGHT: 244 LBS | BODY MASS INDEX: 43.22 KG/M2

## 2021-04-22 DIAGNOSIS — Z79.01 CHRONIC ANTICOAGULATION: ICD-10-CM

## 2021-04-22 DIAGNOSIS — K11.6 PAROTID CYST: Primary | ICD-10-CM

## 2021-04-22 PROCEDURE — 99213 OFFICE O/P EST LOW 20 MIN: CPT | Performed by: OTOLARYNGOLOGY

## 2021-04-22 PROCEDURE — 99207 PR CDG-MDM COMPONENT: MEETS MODERATE - UP CODED: CPT | Performed by: PHYSICIAN ASSISTANT

## 2021-04-22 PROCEDURE — 99213 OFFICE O/P EST LOW 20 MIN: CPT | Mod: GT | Performed by: PHYSICIAN ASSISTANT

## 2021-04-22 NOTE — LETTER
4/22/2021         RE: Jesica Mooney  [address] [address]        Dear Colleague,    Thank you for referring your patient, Jesica Mooney, to the [facility]. Please see a copy of my visit note below.    Chief Complaint - left parotid mass    History of Present Illness - Jesica Mooney is a 45 year old female who returns with a left parotid mass. It has been present for 4-5 months. She had one episode of painful parotid or cheek swelling about 10 years ago. No pain or redness now. Symptoms do not worsen with eating. The patient denies any facial numbness, weakness. She has a h/o Sjogren's, diagnosis 6/2016, but she has had symptoms prior to that. CT neck 12/10/2020 images reviewed showing a left parotid mass measuring approximately 2.7 x 2.4 x 2.5 cm. No lymphadenopathy. I performed a core needle biopsy on this. It was negative and she had a lot of fluid that poured out at the time of biopsy.  I carefully reviewed her CT neck and her ultrasound images.  This information in combination with a very thin copious fluid collection on core needle biopsy leads me to believe that this is a simple cyst or a lymphoepithelial cyst.  She has Sjogren's and the anatomy of her parotid gland on the CT suggests Sjogren's with a cyst.  I see no adjacent nodule or mass suggesting malignancy or other tumor.      She returns and notes for the last 4 months or so she wasn't feeling any swelling or changes in left cheek. Then when she made an appointment 1 week ago she noted a little left swelling. Although she is also using a new left ear piece and cheek is a little itchy. No pain.     Past Medical History -   Patient Active Problem List   Diagnosis     BMI 40.0-44.9, adult (H)     CARDIOVASCULAR SCREENING; LDL GOAL LESS THAN 160     Other acute pulmonary embolism     Sjogren's syndrome (H)     Screening for cervical cancer       Current Medications -   Current Outpatient Medications:  "     cevimeline (EVOXAC) 30 MG capsule, Take 1 capsule (30 mg) by mouth daily, Disp: 90 capsule, Rfl: 3     Doxylamine Succinate, Sleep, (SLEEP AID PO), , Disp: , Rfl:      levonorgestrel (MIRENA) 20 MCG/24HR IUD, 1 each by Intrauterine route once, Disp: , Rfl:      propylene glycol (SYSTANE BALANCE) 0.6 % SOLN ophthalmic solution, Place 1 drop into both eyes 2 times daily \"Systane Balance\", Disp: 1 Bottle, Rfl:      rivaroxaban ANTICOAGULANT (XARELTO ANTICOAGULANT) 20 MG TABS tablet, TAKE 1 TABLET(20 MG) BY MOUTH DAILY WITH DINNER, Disp: 30 tablet, Rfl: 0    Allergies -   Allergies   Allergen Reactions     Keflex [Cephalexin Hcl] Hives     Penicillins Hives     Betadine [Povidone Iodine] Itching     Possibly itchiness, no hives, uncertain allergic reaction.       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   Lifestyle     Physical activity     Days per week: Not on file     Minutes per session: Not on file     Stress: Not on file   Relationships     Social connections     Talks on phone: Not on file     Gets together: Not on file     Attends Bahai service: Not on file     Active member of club or organization: Not on file     Attends meetings " of clubs or organizations: Not on file     Relationship status: Not on file     Intimate partner violence     Fear of current or ex partner: Not on file     Emotionally abused: Not on file     Physically abused: Not on file     Forced sexual activity: Not on file   Other Topics Concern     Parent/sibling w/ CABG, MI or angioplasty before 65F 55M? No      Service Yes     Blood Transfusions No     Caffeine Concern No     Occupational Exposure No     Hobby Hazards No     Sleep Concern No     Stress Concern No     Weight Concern No     Special Diet No     Back Care No     Exercise Yes     Bike Helmet No     Seat Belt Yes     Self-Exams Yes   Social History Narrative     Not on file       Family History -   Family History   Problem Relation Age of Onset     Eye Disorder Mother         cataracts      Rheumatoid Arthritis Mother      Cancer - colorectal Paternal Grandmother         ?     Thyroid Disease Father      Breast Cancer Maternal Aunt         diagnosed in 30s (mother's 1/2 sister)     Cancer No family hx of      Diabetes No family hx of      Hypertension No family hx of      Cerebrovascular Disease No family hx of      Macular Degeneration No family hx of      Glaucoma No family hx of      Review of Systems - As per HPI and PMHx, otherwise 7 system review of the head and neck is negative.    Physical Exam  /78   Pulse 75   Resp 16   SpO2 99%   General - The patient is in no distress.  Alert and oriented x3, answers questions and cooperates with examination appropriately.   Voice and Breathing - The patient was breathing comfortably without the use of accessory muscles. There was no wheezing, stridor, or stertor.  The patients voice was clear and strong.  Head and Face - Normocephalic and atraumatic.    Eyes - Extraocular movements intact. Sclera were not icteric or injected, conjunctiva were pink and moist.  Neurologic - Cranial nerves II-XII are grossly intact. Specifically, the facial nerve is  intact, House-Brackmann grade 1 of 6.   Mouth - Examination of the oral cavity showed pink, healthy oral mucosa. No lesions or ulcerations noted.  The tongue was mobile and protrudes midline.  Oropharynx - The walls of the oropharynx were smooth, symmetric, and had no lesions or ulcerations. The tonsils were 2+ right, 1+ left. No masses. The uvula was midline and the palate raised symmetrically.   Neck - The left parotid gland has a 1.5 cm cyst, somewhat soft of the superficial gland just anterior to the tragus and deep to the lobule. No tenderness. The rest of the occipital, submental, submandibular, internal jugular chain, and supraclavicular nodes did not demonstrate any abnormal lymph nodes or masses.     A/P - Jesica Mooney is a 45 year old female with a left parotid cyst. She has Sjogren's disease. I took a core needle biopsy a few months ago and drained fluid. She was doing well until recently she had a little swelling in the site. It has filled to about 1.5 cm, much better since before last visit. I reviewed the CT and I don't see a solid component. I recommend observation. Return if the cyst grows, becomes painful, new masses arise, or she develops infections.     Cirilo Hernandez MD  Otolaryngology  [facility]        Again, thank you for allowing me to participate in the care of your patient.        Sincerely,        Cirilo Hernandez MD

## 2021-04-22 NOTE — PROGRESS NOTES
Patient was contacted to complete the pre-visit call prior to their video visit with the provider.      Allergies and medications were reviewed.    I thanked them for their time to cover this information     Wilfredo Lopez CMA

## 2021-04-22 NOTE — PROGRESS NOTES
[facility]  [facility]  [facility] [address], [address], [place] [address]  Main: [phone], Fax: [phone]      Outpatient Visit Note:    Patient: Jesica Mooney  MRN: [id number]  : 1975  OWEN: 2021  Video visit done due to COVID 19.  Patient at home and accessed through [employer]. Provider in Clinic.  Start time 10:30am, end time 10:45am.     Reason:  History of pulmonary embolism. History of antithrombin deficiency. On chronic anticoagulation therapy. Annual annual follow up visit for long term anticoagulation.    HPI:  This is a 45-year-old female without any significant past medical history who in Dec 2015 presented to an outside hospital with pleuritic chest pain and was found to have a left lower lobe pulmonary embolism.  At that time, she was using estrogen-containing oral contraceptive pills.  Otherwise, prior to that episode she was feeling fine without any recent illnesses or long travel or any other provoking factors.  A CTA showed acute pulmonary emboli in the lower lobe and segmental and subsegmental pulmonary arteries.  She was started on Xarelto and oral contraceptive pills were discontinued.  At that time there was no hypercoagulable workup performed, but later on she saw Dr. Leon at [facility] who did a hypercoagulable workup which showed a very high-titer positive JAN, persistently elevated D-dimer around 2.5, and persistently slightly low levels of antithrombin III in the 70s.  Her lupus inhibitor, cardiolipin antibodies, beta-2 glycoprotein antibodies, protein C and protein S levels, and factor II and factor V Leiden mutations were negative.  She was subsequently referred to Rheumatology for high positive AJN titer who diagnosed her with Sjogren syndrome.  She continues to take Xarelto without any problems.  She had an ultrasound of the lower extremities, not at the time of presentation for PE, but in May  2016 which was unremarkable and did not show any DVT.      The patient underwent initial consultation with Dr. Ata Rendon back in June 14th 2016. At the time, Dr. Rendon recommended that she should maintain on anticoagulation while undergo gene sequencing for antithrombin deficiency. 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 However, Dr. Rendon felt that this likely is a pathologic gene mutation as this mutation has been identified in some individuals with a history of clotting. Thus it was recommended that Jesica is to remain on indefinite anticoagulation therapy.    Interim History:  Jesica reports that in the past 5 years, she has remained on Xarelto at 20 mg PO Qday dosing and has been tolerating it well. She rarely miss any doses. She denies any significant bleeding complications while on the medications. In fact, she reports that she does not even felt like she is taking the medication. She has an IUD placed shortly after her pulmonary embolic event back in 2015 without any current issues of heavy menstrual bleeding.  This past year she was told it could stay in one more year but will be replaced this fall.     She denies any issues with frequent epistaxis, no gum bleeding. Denies any hematuria or blood in stools. Denies any frequent bruising.     Medications:  Current Outpatient Medications   Medication     cevimeline (EVOXAC) 30 MG capsule     Doxylamine Succinate, Sleep, (SLEEP AID PO)     levonorgestrel (MIRENA) 20 MCG/24HR IUD     propylene glycol (SYSTANE BALANCE) 0.6 % SOLN ophthalmic solution     rivaroxaban ANTICOAGULANT (XARELTO ANTICOAGULANT) 20 MG TABS tablet     No current facility-administered medications for this visit.      Allergies:  Allergies   Allergen Reactions     Keflex [Cephalexin Hcl] Hives     Penicillins Hives     Betadine [Povidone Iodine] Itching     Possibly itchiness, no hives, uncertain allergic reaction.      PmHx:  Past Medical History:   Diagnosis Date     IUD (intrauterine device) in place     Mirena inserted 12/28/15     Social History and Family History:  Deferred.    ROS:  As above. Denies any shortness of breath. No chest pain.  She had a parotid gland abscess drained a few months ago.  She did not stop her anticoagulation for this.  She had follow-up today and ENT had no concerns. She has completed her COVID vaccine. She did not experience COVID infection this past year.    Objective:  Phone visit  Wt 110.7 kg (244 lb)   BMI 43.22 kg/m    Exam:  Complete exam is not performed today.    Labs:  Cr 0.84. ALT: 46, AST: 21 11/2020    Assessment:  In summary, Gretchen is a 45 year old female with a history of pulmonary embolism in Dec 2015 that was associated with long term (20 years) estrogen use without any other triggers, and subsequently found to have persistently mildly decreased antithrombin levels as well as 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 In general, Jesica is doing very well with Xarelto without any issues with bleeding complications or any recurrent venous thromboembolic events. She is very content with this regimen.     Diagnosis:  1. History of pulmonary embolism - Dec 2015.   2. Antithrombin deficiency.  3. 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     Plan:  No change in regard to her current anticoagulation therapy with Xarelto at 20 mg PO Qday dosing.     She will call if she  has any unusual bleeding issues or if she needs any invasive or surgical procedures.  She will have IUD replacement this year.  If they request she hold anticoagulation - she will hold the day before procedure. She will call  our with any questions and we can provide anticoagulation management recommendation surrounding procedures.    We will plan on seeing her  back annually.  Xarelto Rx refilled today.  Scheduled her for her annual visit today so as not to forget.       Total Time Spent:  15 minutes, all 15 minutes was spent on video consultation with the patient and coordination of care in regard to her history of pulmonary embolism and antithrombin deficiency as well as anticoagulation therapy management.          Lesly Baxter MPH, PA-C  [facility]  [facility]  [phone] main line  [phone] pager  [phone] fax

## 2021-04-22 NOTE — PROGRESS NOTES
Chief Complaint - left parotid mass    History of Present Illness - Jesica Mooney is a 45 year old female who returns with a left parotid mass. It has been present for 4-5 months. She had one episode of painful parotid or cheek swelling about 10 years ago. No pain or redness now. Symptoms do not worsen with eating. The patient denies any facial numbness, weakness. She has a h/o Sjogren's, diagnosis 6/2016, but she has had symptoms prior to that. CT neck 12/10/2020 images reviewed showing a left parotid mass measuring approximately 2.7 x 2.4 x 2.5 cm. No lymphadenopathy. I performed a core needle biopsy on this. It was negative and she had a lot of fluid that poured out at the time of biopsy.  I carefully reviewed her CT neck and her ultrasound images.  This information in combination with a very thin copious fluid collection on core needle biopsy leads me to believe that this is a simple cyst or a lymphoepithelial cyst.  She has Sjogren's and the anatomy of her parotid gland on the CT suggests Sjogren's with a cyst.  I see no adjacent nodule or mass suggesting malignancy or other tumor.      She returns and notes for the last 4 months or so she wasn't feeling any swelling or changes in left cheek. Then when she made an appointment 1 week ago she noted a little left swelling. Although she is also using a new left ear piece and cheek is a little itchy. No pain.     Past Medical History -   Patient Active Problem List   Diagnosis     BMI 40.0-44.9, adult (H)     CARDIOVASCULAR SCREENING; LDL GOAL LESS THAN 160     Other acute pulmonary embolism     Sjogren's syndrome (H)     Screening for cervical cancer       Current Medications -   Current Outpatient Medications:      cevimeline (EVOXAC) 30 MG capsule, Take 1 capsule (30 mg) by mouth daily, Disp: 90 capsule, Rfl: 3     Doxylamine Succinate, Sleep, (SLEEP AID PO), , Disp: , Rfl:      levonorgestrel (MIRENA) 20 MCG/24HR IUD, 1 each by Intrauterine route once,  "Disp: , Rfl:      propylene glycol (SYSTANE BALANCE) 0.6 % SOLN ophthalmic solution, Place 1 drop into both eyes 2 times daily \"Systane Balance\", Disp: 1 Bottle, Rfl:      rivaroxaban ANTICOAGULANT (XARELTO ANTICOAGULANT) 20 MG TABS tablet, TAKE 1 TABLET(20 MG) BY MOUTH DAILY WITH DINNER, Disp: 30 tablet, Rfl: 0    Allergies -   Allergies   Allergen Reactions     Keflex [Cephalexin Hcl] Hives     Penicillins Hives     Betadine [Povidone Iodine] Itching     Possibly itchiness, no hives, uncertain allergic reaction.       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   Lifestyle     Physical activity     Days per week: Not on file     Minutes per session: Not on file     Stress: Not on file   Relationships     Social connections     Talks on phone: Not on file     Gets together: Not on file     Attends Church service: Not on file     Active member of club or organization: Not on file     Attends meetings of clubs or organizations: Not on file     Relationship status: Not on file     Intimate partner violence     Fear of current or ex partner: Not on file     Emotionally abused: Not on file     Physically abused: Not on file     Forced sexual " activity: Not on file   Other Topics Concern     Parent/sibling w/ CABG, MI or angioplasty before 65F 55M? No      Service Yes     Blood Transfusions No     Caffeine Concern No     Occupational Exposure No     Hobby Hazards No     Sleep Concern No     Stress Concern No     Weight Concern No     Special Diet No     Back Care No     Exercise Yes     Bike Helmet No     Seat Belt Yes     Self-Exams Yes   Social History Narrative     Not on file       Family History -   Family History   Problem Relation Age of Onset     Eye Disorder Mother         cataracts      Rheumatoid Arthritis Mother      Cancer - colorectal Paternal Grandmother         ?     Thyroid Disease Father      Breast Cancer Maternal Aunt         diagnosed in 30s (mother's 1/2 sister)     Cancer No family hx of      Diabetes No family hx of      Hypertension No family hx of      Cerebrovascular Disease No family hx of      Macular Degeneration No family hx of      Glaucoma No family hx of      Review of Systems - As per HPI and PMHx, otherwise 7 system review of the head and neck is negative.    Physical Exam  /78   Pulse 75   Resp 16   SpO2 99%   General - The patient is in no distress.  Alert and oriented x3, answers questions and cooperates with examination appropriately.   Voice and Breathing - The patient was breathing comfortably without the use of accessory muscles. There was no wheezing, stridor, or stertor.  The patients voice was clear and strong.  Head and Face - Normocephalic and atraumatic.    Eyes - Extraocular movements intact. Sclera were not icteric or injected, conjunctiva were pink and moist.  Neurologic - Cranial nerves II-XII are grossly intact. Specifically, the facial nerve is intact, House-Brackmann grade 1 of 6.   Mouth - Examination of the oral cavity showed pink, healthy oral mucosa. No lesions or ulcerations noted.  The tongue was mobile and protrudes midline.  Oropharynx - The walls of the oropharynx were  smooth, symmetric, and had no lesions or ulcerations. The tonsils were 2+ right, 1+ left. No masses. The uvula was midline and the palate raised symmetrically.   Neck - The left parotid gland has a 1.5 cm cyst, somewhat soft of the superficial gland just anterior to the tragus and deep to the lobule. No tenderness. The rest of the occipital, submental, submandibular, internal jugular chain, and supraclavicular nodes did not demonstrate any abnormal lymph nodes or masses.     A/P - Jesica Mooney is a 45 year old female with a left parotid cyst. She has Sjogren's disease. I took a core needle biopsy a few months ago and drained fluid. She was doing well until recently she had a little swelling in the site. It has filled to about 1.5 cm, much better since before last visit. I reviewed the CT and I don't see a solid component. I recommend observation. Return if the cyst grows, becomes painful, new masses arise, or she develops infections.     Cirilo Hernandez MD  Otolaryngology  [facility]

## 2021-07-09 ENCOUNTER — OFFICE VISIT (OUTPATIENT)
Dept: OTOLARYNGOLOGY | Facility: CLINIC | Age: 46
End: 2021-07-09
Payer: COMMERCIAL

## 2021-07-09 VITALS
HEART RATE: 72 BPM | RESPIRATION RATE: 18 BRPM | DIASTOLIC BLOOD PRESSURE: 83 MMHG | OXYGEN SATURATION: 96 % | SYSTOLIC BLOOD PRESSURE: 116 MMHG

## 2021-07-09 DIAGNOSIS — K11.6 PAROTID CYST: Primary | ICD-10-CM

## 2021-07-09 PROCEDURE — 99213 OFFICE O/P EST LOW 20 MIN: CPT | Mod: 25 | Performed by: OTOLARYNGOLOGY

## 2021-07-09 PROCEDURE — 10160 PNXR ASPIR ABSC HMTMA BULLA: CPT | Performed by: OTOLARYNGOLOGY

## 2021-07-09 NOTE — LETTER
7/9/2021         RE: Jesica Mooney  [address] [address]        Dear Colleague,    Thank you for referring your patient, Jesica Mooney, to the [facility]. Please see a copy of my visit note below.    Chief Complaint - left parotid cyst    History of Present Illness - Jesica Mooney is a 45 year old female who returns with a left parotid cyst. It has been present for many months. She had one episode of painful parotid or cheek swelling about 10 years ago too. She has a h/o Sjogren's, diagnosis 6/2016, but she has had symptoms prior to that. CT neck 12/10/2020 images reviewed showing a left parotid mass measuring approximately 2.7 x 2.4 x 2.5 cm. No lymphadenopathy. I performed a core needle biopsy on this (12/2020). It was negative and she had a lot of fluid that poured out at the time of biopsy.  I carefully reviewed her CT neck and her ultrasound images.  This information in combination with a very thin copious fluid collection on core needle biopsy leads me to believe that this is a simple cyst or a lymphoepithelial cyst.  She has Sjogren's and the anatomy of her parotid gland on the CT suggests Sjogren's with a cyst.  I see no adjacent nodule or mass suggesting malignancy or other tumor.      She returns and notes slowly worsening left parotid swelling at her cyst site. No infection or pain. No redness. Cheek is a little itchy. Symptoms do not worsen with eating. She would like the cyst aspirated again.     Past Medical History -   Patient Active Problem List   Diagnosis     BMI 40.0-44.9, adult (H)     CARDIOVASCULAR SCREENING; LDL GOAL LESS THAN 160     Other acute pulmonary embolism     Sjogren's syndrome (H)     Screening for cervical cancer       Current Medications -   Current Outpatient Medications:      cevimeline (EVOXAC) 30 MG capsule, Take 1 capsule (30 mg) by mouth daily, Disp: 90 capsule, Rfl: 3     Doxylamine Succinate, Sleep, (SLEEP AID PO), ,  "Disp: , Rfl:      levonorgestrel (MIRENA) 20 MCG/24HR IUD, 1 each by Intrauterine route once, Disp: , Rfl:      propylene glycol (SYSTANE BALANCE) 0.6 % SOLN ophthalmic solution, Place 1 drop into both eyes 2 times daily \"Systane Balance\", Disp: 1 Bottle, Rfl:      rivaroxaban ANTICOAGULANT (XARELTO ANTICOAGULANT) 20 MG TABS tablet, TAKE 1 TABLET(20 MG) BY MOUTH DAILY WITH DINNER, Disp: 30 tablet, Rfl: 11    Allergies -   Allergies   Allergen Reactions     Keflex [Cephalexin Hcl] Hives     Penicillins Hives     Betadine [Povidone Iodine] Itching     Possibly itchiness, no hives, uncertain allergic reaction.       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   Lifestyle     Physical activity     Days per week: Not on file     Minutes per session: Not on file     Stress: Not on file   Relationships     Social connections     Talks on phone: Not on file     Gets together: Not on file     Attends Jehovah's witness service: Not on file     Active member of club or organization: Not on file     Attends meetings of clubs or organizations: Not on file     Relationship status: Not on file     Intimate partner violence     Fear of current or ex partner: Not on file "     Emotionally abused: Not on file     Physically abused: Not on file     Forced sexual activity: Not on file   Other Topics Concern     Parent/sibling w/ CABG, MI or angioplasty before 65F 55M? No      Service Yes     Blood Transfusions No     Caffeine Concern No     Occupational Exposure No     Hobby Hazards No     Sleep Concern No     Stress Concern No     Weight Concern No     Special Diet No     Back Care No     Exercise Yes     Bike Helmet No     Seat Belt Yes     Self-Exams Yes   Social History Narrative     Not on file       Family History -   Family History   Problem Relation Age of Onset     Eye Disorder Mother         cataracts      Rheumatoid Arthritis Mother      Cancer - colorectal Paternal Grandmother         ?     Thyroid Disease Father      Breast Cancer Maternal Aunt         diagnosed in 30s (mother's 1/2 sister)     Cancer No family hx of      Diabetes No family hx of      Hypertension No family hx of      Cerebrovascular Disease No family hx of      Macular Degeneration No family hx of      Glaucoma No family hx of        Physical Exam  /83   Pulse 72   Resp 18   SpO2 96%   General - The patient is in no distress.  Alert and oriented x3, answers questions and cooperates with examination appropriately.   Voice and Breathing - The patient was breathing comfortably without the use of accessory muscles. There was no wheezing, stridor, or stertor.  The patients voice was clear and strong.  Head and Face - Normocephalic and atraumatic.    Eyes - Extraocular movements intact. Sclera were not icteric or injected, conjunctiva were pink and moist.  Neurologic - Cranial nerves II-XII are grossly intact. Specifically, the facial nerve is intact, House-Brackmann grade 1 of 6.   Neck - The left parotid gland has a 2 cm cyst, somewhat firm of the superficial gland just anterior to the tragus and deep to the lobule. No tenderness. The rest of the occipital, submental, submandibular, internal  jugular chain, and supraclavicular nodes did not demonstrate any abnormal lymph nodes or masses.     PROCEDURE - I explained the risks of needle aspiration including infection, bleeding, recurrence, the need for future procedures including removal. She agreed and wished to proceed. I cleansed the skin with alcohol (she may have a betadine allergey). I injected 1% lidocaine, 1:100,000 epinephrine (1 ml) into the soft just anterior to the left ear lobule. I then used a 16 gauge needle and aspirated almost 6 ml of turbid cyst fluid. Minimal bleeding. Bacitracin and a bandaid was placed.     A/P - Jesicaaustin Mooney is a 45 year old female with a left parotid cyst. She has Sjogren's disease. I took a core needle biopsy 12/2020 and drained cyst fluid. She was doing well until recently she had worsening swelling at the site. It has filled to about 2 cm. I drained this again today at her request. Recheck in 6 months, sooner if this gets infected, enlarges faster, other lumps or masses appear.     Cirilo Hernandez MD  Otolaryngology  [facility]        Again, thank you for allowing me to participate in the care of your patient.        Sincerely,        Cirilo Hernandez MD

## 2021-07-09 NOTE — PROGRESS NOTES
"Chief Complaint - left parotid cyst    History of Present Illness - Jesica Mooney is a 45 year old female who returns with a left parotid cyst. It has been present for many months. She had one episode of painful parotid or cheek swelling about 10 years ago too. She has a h/o Sjogren's, diagnosis 6/2016, but she has had symptoms prior to that. CT neck 12/10/2020 images reviewed showing a left parotid mass measuring approximately 2.7 x 2.4 x 2.5 cm. No lymphadenopathy. I performed a core needle biopsy on this (12/2020). It was negative and she had a lot of fluid that poured out at the time of biopsy.  I carefully reviewed her CT neck and her ultrasound images.  This information in combination with a very thin copious fluid collection on core needle biopsy leads me to believe that this is a simple cyst or a lymphoepithelial cyst.  She has Sjogren's and the anatomy of her parotid gland on the CT suggests Sjogren's with a cyst.  I see no adjacent nodule or mass suggesting malignancy or other tumor.      She returns and notes slowly worsening left parotid swelling at her cyst site. No infection or pain. No redness. Cheek is a little itchy. Symptoms do not worsen with eating. She would like the cyst aspirated again.     Past Medical History -   Patient Active Problem List   Diagnosis     BMI 40.0-44.9, adult (H)     CARDIOVASCULAR SCREENING; LDL GOAL LESS THAN 160     Other acute pulmonary embolism     Sjogren's syndrome (H)     Screening for cervical cancer       Current Medications -   Current Outpatient Medications:      cevimeline (EVOXAC) 30 MG capsule, Take 1 capsule (30 mg) by mouth daily, Disp: 90 capsule, Rfl: 3     Doxylamine Succinate, Sleep, (SLEEP AID PO), , Disp: , Rfl:      levonorgestrel (MIRENA) 20 MCG/24HR IUD, 1 each by Intrauterine route once, Disp: , Rfl:      propylene glycol (SYSTANE BALANCE) 0.6 % SOLN ophthalmic solution, Place 1 drop into both eyes 2 times daily \"Systane Balance\", Disp: 1 " Bottle, Rfl:      rivaroxaban ANTICOAGULANT (XARELTO ANTICOAGULANT) 20 MG TABS tablet, TAKE 1 TABLET(20 MG) BY MOUTH DAILY WITH DINNER, Disp: 30 tablet, Rfl: 11    Allergies -   Allergies   Allergen Reactions     Keflex [Cephalexin Hcl] Hives     Penicillins Hives     Betadine [Povidone Iodine] Itching     Possibly itchiness, no hives, uncertain allergic reaction.       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   Lifestyle     Physical activity     Days per week: Not on file     Minutes per session: Not on file     Stress: Not on file   Relationships     Social connections     Talks on phone: Not on file     Gets together: Not on file     Attends Bahai service: Not on file     Active member of club or organization: Not on file     Attends meetings of clubs or organizations: Not on file     Relationship status: Not on file     Intimate partner violence     Fear of current or ex partner: Not on file     Emotionally abused: Not on file     Physically abused: Not on file     Forced sexual activity: Not on file   Other Topics Concern     Parent/sibling w/ CABG, MI or angioplasty before 65F 55M? No      Service Yes     Blood Transfusions No      Caffeine Concern No     Occupational Exposure No     Hobby Hazards No     Sleep Concern No     Stress Concern No     Weight Concern No     Special Diet No     Back Care No     Exercise Yes     Bike Helmet No     Seat Belt Yes     Self-Exams Yes   Social History Narrative     Not on file       Family History -   Family History   Problem Relation Age of Onset     Eye Disorder Mother         cataracts      Rheumatoid Arthritis Mother      Cancer - colorectal Paternal Grandmother         ?     Thyroid Disease Father      Breast Cancer Maternal Aunt         diagnosed in 30s (mother's 1/2 sister)     Cancer No family hx of      Diabetes No family hx of      Hypertension No family hx of      Cerebrovascular Disease No family hx of      Macular Degeneration No family hx of      Glaucoma No family hx of        Physical Exam  /83   Pulse 72   Resp 18   SpO2 96%   General - The patient is in no distress.  Alert and oriented x3, answers questions and cooperates with examination appropriately.   Voice and Breathing - The patient was breathing comfortably without the use of accessory muscles. There was no wheezing, stridor, or stertor.  The patients voice was clear and strong.  Head and Face - Normocephalic and atraumatic.    Eyes - Extraocular movements intact. Sclera were not icteric or injected, conjunctiva were pink and moist.  Neurologic - Cranial nerves II-XII are grossly intact. Specifically, the facial nerve is intact, House-Brackmann grade 1 of 6.   Neck - The left parotid gland has a 2 cm cyst, somewhat firm of the superficial gland just anterior to the tragus and deep to the lobule. No tenderness. The rest of the occipital, submental, submandibular, internal jugular chain, and supraclavicular nodes did not demonstrate any abnormal lymph nodes or masses.     PROCEDURE - I explained the risks of needle aspiration including infection, bleeding, recurrence, the need for future procedures including removal. She  agreed and wished to proceed. I cleansed the skin with alcohol (she may have a betadine allergey). I injected 1% lidocaine, 1:100,000 epinephrine (1 ml) into the soft just anterior to the left ear lobule. I then used a 16 gauge needle and aspirated almost 6 ml of turbid cyst fluid. Minimal bleeding. Bacitracin and a bandaid was placed.     A/P - Jesicaklaudia Mooney is a 45 year old female with a left parotid cyst. She has Sjogren's disease. I took a core needle biopsy 12/2020 and drained cyst fluid. She was doing well until recently she had worsening swelling at the site. It has filled to about 2 cm. I drained this again today at her request. Recheck in 6 months, sooner if this gets infected, enlarges faster, other lumps or masses appear.     Cirilo Hernandez MD  Otolaryngology  [facility]

## 2021-07-23 ENCOUNTER — MYC MEDICAL ADVICE (OUTPATIENT)
Dept: OBGYN | Facility: CLINIC | Age: 46
End: 2021-07-23

## 2021-10-02 ENCOUNTER — HEALTH MAINTENANCE LETTER (OUTPATIENT)
Age: 46
End: 2021-10-02

## 2021-10-20 ENCOUNTER — OFFICE VISIT (OUTPATIENT)
Dept: OBGYN | Facility: CLINIC | Age: 46
End: 2021-10-20
Payer: COMMERCIAL

## 2021-10-20 VITALS
BODY MASS INDEX: 43.64 KG/M2 | DIASTOLIC BLOOD PRESSURE: 71 MMHG | HEIGHT: 64 IN | WEIGHT: 255.6 LBS | OXYGEN SATURATION: 96 % | HEART RATE: 81 BPM | TEMPERATURE: 98.4 F | SYSTOLIC BLOOD PRESSURE: 107 MMHG

## 2021-10-20 DIAGNOSIS — Z13.6 CARDIOVASCULAR SCREENING; LDL GOAL LESS THAN 130: ICD-10-CM

## 2021-10-20 DIAGNOSIS — Z30.433 ENCOUNTER FOR REMOVAL AND REINSERTION OF INTRAUTERINE CONTRACEPTIVE DEVICE: ICD-10-CM

## 2021-10-20 DIAGNOSIS — Z01.419 ENCOUNTER FOR GYNECOLOGICAL EXAMINATION WITHOUT ABNORMAL FINDING: Primary | ICD-10-CM

## 2021-10-20 DIAGNOSIS — Z23 NEED FOR PROPHYLACTIC VACCINATION AND INOCULATION AGAINST INFLUENZA: ICD-10-CM

## 2021-10-20 PROCEDURE — 99396 PREV VISIT EST AGE 40-64: CPT | Mod: 25 | Performed by: NURSE PRACTITIONER

## 2021-10-20 PROCEDURE — 90686 IIV4 VACC NO PRSV 0.5 ML IM: CPT | Performed by: NURSE PRACTITIONER

## 2021-10-20 PROCEDURE — 58301 REMOVE INTRAUTERINE DEVICE: CPT | Performed by: NURSE PRACTITIONER

## 2021-10-20 PROCEDURE — 90471 IMMUNIZATION ADMIN: CPT | Performed by: NURSE PRACTITIONER

## 2021-10-20 PROCEDURE — 58300 INSERT INTRAUTERINE DEVICE: CPT | Performed by: NURSE PRACTITIONER

## 2021-10-20 ASSESSMENT — MIFFLIN-ST. JEOR: SCORE: 1771.45

## 2021-10-20 ASSESSMENT — PAIN SCALES - GENERAL: PAINLEVEL: NO PAIN (0)

## 2021-10-20 NOTE — PROGRESS NOTES
SUBJECTIVE:   CC: Jesica Mooney is an 46 year old woman who presents for preventive health visit.     {Healthy Habits:     Getting at least 3 servings of Calcium per day:  Yes    Bi-annual eye exam:  Yes    Dental care twice a year:  Yes    Sleep apnea or symptoms of sleep apnea:  None    Diet:  Regular (no restrictions)    Frequency of exercise:  4-5 days/week    Duration of exercise:  30-45 minutes    Taking medications regularly:  Yes    Medication side effects:  None    PHQ-2 Total Score: 0    Additional concerns today:  No  IUD      IUD is due to be replaced by the end of the year-using it for contraception after a PE. Doing well with the Mirena. If she has any bleeding, it usually lasts less than a day and does not occur with any regularity.   Sees Hematology and Rheumatology annually.   Not fasting today, but will be needed labs prior to her Rheumatology appointment at the end of November, so can plan to be fasting when she had them done.    Today's PHQ-2 Score:   PHQ-2 ( 1999 Pf[employer]) 10/19/2021   Q1: Little interest or pleasure in doing things 0   Q2: Feeling down, depressed or hopeless 0   PHQ-2 Score 0   Q1: Little interest or pleasure in doing things Not at all   Q2: Feeling down, depressed or hopeless Not at all   PHQ-2 Score 0       Abuse: Current or Past (Physical, Sexual or Emotional) - No  Do you feel safe in your environment? Yes        Social History     Tobacco Use     Smoking status: Never Smoker     Smokeless tobacco: Never Used     Tobacco comment: Lives in smoke free household   Substance Use Topics     Alcohol use: No     Alcohol/week: 0.0 standard drinks         Alcohol Use 10/19/2021   Prescreen: >3 drinks/day or >7 drinks/week? Not Applicable   Prescreen: >3 drinks/day or >7 drinks/week? -   No flowsheet data found.    Reviewed orders with patient.  Reviewed health maintenance and updated orders accordingly - Yes  Patient Active Problem List   Diagnosis     BMI 40.0-44.9, adult  (H)     CARDIOVASCULAR SCREENING; LDL GOAL LESS THAN 160     Other acute pulmonary embolism     Sjogren's syndrome (H)     Screening for cervical cancer     Past Surgical History:   Procedure Laterality Date     BIOPSY  a fews years ago    A mole was removed from by scalp     C ORAL SURGERY PROCEDURE  10/1990    wisdom teeth extracted       Social History     Tobacco Use     Smoking status: Never Smoker     Smokeless tobacco: Never Used     Tobacco comment: Lives in smoke free household   Substance Use Topics     Alcohol use: No     Alcohol/week: 0.0 standard drinks     Family History   Problem Relation Age of Onset     Eye Disorder Mother         cataracts      Rheumatoid Arthritis Mother      Cancer - colorectal Paternal Grandmother         ?     Thyroid Disease Father      Breast Cancer Maternal Aunt         diagnosed in 30s (mother's 1/2 sister)     Cancer No family hx of      Diabetes No family hx of      Hypertension No family hx of      Cerebrovascular Disease No family hx of      Macular Degeneration No family hx of      Glaucoma No family hx of            Breast Cancer Screening:  Any new diagnosis of family breast, ovarian, or bowel cancer? No    Mammogram Screening: Recommended annual mammography  Pertinent mammograms are reviewed under the imaging tab.    History of abnormal Pap smear: NO - age 30-65 PAP every 5 years with negative HPV co-testing recommended  PAP / HPV Latest Ref Rng & Units 11/26/2018 10/5/2015 4/23/2012   PAP (Historical) - NIL NIL NIL   HPV16 NEG:Negative Negative Negative -   HPV18 NEG:Negative Negative Negative -   HRHPV NEG:Negative Negative Negative -     Reviewed and updated as needed this visit by clinical staff  Tobacco  Allergies  Meds  Problems  Med Hx  Surg Hx  Fam Hx  Soc Hx          Reviewed and updated as needed this visit by Provider     Problems            Past Medical History:   Diagnosis Date     IUD (intrauterine device) in place     Mirena inserted 12/28/15  "     Past Surgical History:   Procedure Laterality Date     BIOPSY  a fews years ago    A mole was removed from by scalp     C ORAL SURGERY PROCEDURE  10/1990    wisdom teeth extracted       Review of Systems  CONSTITUTIONAL: NEGATIVE for fever, chills, change in weight  INTEGUMENTARU/SKIN: NEGATIVE for worrisome rashes, moles or lesions  EYES: NEGATIVE for vision changes or irritation  ENT: NEGATIVE for ear, mouth and throat problems  RESP: NEGATIVE for significant cough or SOB  BREAST: NEGATIVE for masses, tenderness or discharge  CV: NEGATIVE for chest pain, palpitations or peripheral edema  GI: NEGATIVE for nausea, abdominal pain, heartburn, or change in bowel habits  : NEGATIVE for unusual urinary or vaginal symptoms. Periods are suppressed with Mirena.  MUSCULOSKELETAL: NEGATIVE for significant arthralgias or myalgia  NEURO: NEGATIVE for weakness, dizziness or paresthesias  PSYCHIATRIC: NEGATIVE for changes in mood or affect     OBJECTIVE:   /71 (BP Location: Right arm, Patient Position: Sitting, Cuff Size: Adult Large)   Pulse 81   Temp 98.4  F (36.9  C) (Tympanic)   Ht 1.613 m (5' 3.5\")   Wt 115.9 kg (255 lb 9.6 oz)   SpO2 96%   BMI 44.57 kg/m    Physical Exam  GENERAL: healthy, alert and no distress  EYES: Eyes grossly normal to inspection, PERRL and conjunctivae and sclerae normal  HENT: ear canals and TM's normal  NECK: no adenopathy, no asymmetry, masses, or scars and thyroid normal to palpation  RESP: lungs clear to auscultation - no rales, rhonchi or wheezes  BREAST: normal without masses, tenderness or nipple discharge and no palpable axillary masses or adenopathy  CV: regular rate and rhythm, normal S1 S2, no S3 or S4, no murmur, click or rub, no peripheral edema and peripheral pulses strong  ABDOMEN: soft, nontender, no hepatosplenomegaly, no masses and bowel sounds normal   (female): normal female external genitalia, normal urethral meatus, vaginal mucosa pink, moist, well rugated, " "and normal cervix/adnexa/uterus without masses or discharge  MS: no gross musculoskeletal defects noted, no edema  SKIN: no suspicious lesions or rashes  NEURO: Normal strength and tone, mentation intact and speech normal  PSYCH: mentation appears normal, affect normal/bright    ASSESSMENT/PLAN:   (Z01.419) Encounter for gynecological examination without abnormal finding  (primary encounter diagnosis)  Comment: Pap smear up to date, mammogram scheduled.    (Z13.6) CARDIOVASCULAR SCREENING; LDL GOAL LESS THAN 130  Plan: Lipid panel reflex to direct LDL Fasting    (Z23) Need for prophylactic vaccination and inoculation against influenza  Plan: INFLUENZA VACCINE IM > 6 MONTHS VALENT IIV4         (AFLURIA/FLUZONE)        COUNSELING:  Reviewed preventive health counseling, as reflected in patient instructions  Special attention given to:        Regular exercise       Healthy diet/nutrition       Contraception       (Pamela)menopause management    Estimated body mass index is 44.57 kg/m  as calculated from the following:    Height as of this encounter: 1.613 m (5' 3.5\").    Weight as of this encounter: 115.9 kg (255 lb 9.6 oz).    Weight management plan: Discussed healthy diet and exercise guidelines    She reports that she has never smoked. She has never used smokeless tobacco.      Counseling Resources:  ATP IV Guidelines  Pooled Cohorts Equation Calculator  Breast Cancer Risk Calculator  BRCA-Related Cancer Risk Assessment: FHS-7 Tool  FRAX Risk Assessment  ICSI Preventive Guidelines  Dietary Guidelines for Americans, 2010  USDA's MyPlate  ASA Prophylaxis  Lung CA Screening    JUDSON Pizarro CNP  [facility]  "

## 2021-10-20 NOTE — PROGRESS NOTES
Jesica Mooney is a 46 year old year old  who presents today requesting removal of her current IUD and placement of a Mirena IUD. Current IUD is due to be removed Dec 2021.  Patient's hematologist had recommending holding her Xarelto dose the night before the IUD replacement and taking it as soon as the procedure was completed, which she has done.    The patient meets and is agreeable to the following conditions:  She is not interested in conception in the near future.   She currently is in a stable, monogamous relationship.   There is no previous history of pelvic inflammatory disease.   There is no previous history of ectopic pregnancy.   She is willing to check monthly for the IUD string.   There is no history of unresolved abnormal uterine bleeding.   There is no history of an unresolved abnormal PAP smear.   She has no history of Jamison's disease or an allergy to copper (for Paraguard).   She has had a PAP smear within the ACOG screening guideline.   She denies the possibility of pregnancy.     We discussed risks, benefits, and alternatives including but not limited to:   Possibility of pregnancy and ectopic pregnancy.  Possibility of pelvic inflammatory disease, particularly with new partners.  Risk of uterine perforation or IUD expulsion.  Possibility of difficult removal.  Spotting or heavy bleeding.  Cramping, pain or infection during or after insertion.    The patient was given patient information on the IUD and the patient education brochure from the .  The patient has given consent to proceed with removal of and new placement of the IUD.  She wishes to proceed.  All questions answered.    PROCEDURE:    Type of IUD: Mirena    The patient has taken 600 mg of Ibuprofen prior to the procedure. She is placed in a dorsal lithotomy position and a pelvic exam is performed to determine the position of the uterus.  The cervix is identified and the IUD easily removed intact with a ring forceps.  Pt shown the intact IUD.  Patient has a possible allergy to betadine-had a reaction of itching after an ENT procedure about a year ago-has used betadine for last IUD insertion without issue, however, we decided to use acceptable alternative to clean the cervix. A single tooth tenaculum is applied to the anterior lip of the cervix for stabilization. The uterus sounded to 7.5 cm. (Target sound depth is 6.5 cm to 8.5 cm.) The IUD insertion tube is prepared to manufacturers recommendations and inserted into the uterus under sterile conditions in the usual fashion. The IUD string is then cut to 2.5 cm.    The patient tolerated this procedure without immediate complication.  The patient is to return or call immediately for any unexplained fever, abdominal or pelvic pain, excessive bleeding, possibility of pregnancy, foul-smelling discharge, sense that the IUD has been expelled.  All questions were answered.    Return to clinic in 1 month for IUD check    Brenda ROPER CNP

## 2021-10-30 ENCOUNTER — ANCILLARY PROCEDURE (OUTPATIENT)
Dept: MAMMOGRAPHY | Facility: CLINIC | Age: 46
End: 2021-10-30
Attending: NURSE PRACTITIONER
Payer: COMMERCIAL

## 2021-10-30 DIAGNOSIS — Z12.31 ENCOUNTER FOR SCREENING MAMMOGRAM FOR BREAST CANCER: ICD-10-CM

## 2021-10-30 PROCEDURE — 77067 SCR MAMMO BI INCL CAD: CPT | Mod: TC | Performed by: RADIOLOGY

## 2021-11-21 ENCOUNTER — LAB (OUTPATIENT)
Dept: LAB | Facility: CLINIC | Age: 46
End: 2021-11-21
Payer: COMMERCIAL

## 2021-11-21 DIAGNOSIS — M35.01 SJOGREN'S SYNDROME WITH KERATOCONJUNCTIVITIS SICCA (H): ICD-10-CM

## 2021-11-21 LAB
ALBUMIN SERPL-MCNC: 3.2 G/DL (ref 3.4–5)
ALBUMIN UR-MCNC: NEGATIVE MG/DL
ALP SERPL-CCNC: 59 U/L (ref 40–150)
ALT SERPL W P-5'-P-CCNC: 47 U/L (ref 0–50)
ANION GAP SERPL CALCULATED.3IONS-SCNC: 4 MMOL/L (ref 3–14)
APPEARANCE UR: CLEAR
AST SERPL W P-5'-P-CCNC: 26 U/L (ref 0–45)
BASOPHILS # BLD AUTO: 0 10E3/UL (ref 0–0.2)
BASOPHILS NFR BLD AUTO: 1 %
BILIRUB SERPL-MCNC: 0.5 MG/DL (ref 0.2–1.3)
BILIRUB UR QL STRIP: NEGATIVE
BUN SERPL-MCNC: 12 MG/DL (ref 7–30)
CALCIUM SERPL-MCNC: 9 MG/DL (ref 8.5–10.1)
CHLORIDE BLD-SCNC: 107 MMOL/L (ref 94–109)
CO2 SERPL-SCNC: 27 MMOL/L (ref 20–32)
COLOR UR AUTO: YELLOW
CREAT SERPL-MCNC: 0.73 MG/DL (ref 0.52–1.04)
CREAT UR-MCNC: 28 MG/DL
EOSINOPHIL # BLD AUTO: 0.5 10E3/UL (ref 0–0.7)
EOSINOPHIL NFR BLD AUTO: 7 %
ERYTHROCYTE [DISTWIDTH] IN BLOOD BY AUTOMATED COUNT: 12.8 % (ref 10–15)
GFR SERPL CREATININE-BSD FRML MDRD: >90 ML/MIN/1.73M2
GLUCOSE BLD-MCNC: 87 MG/DL (ref 70–99)
GLUCOSE UR STRIP-MCNC: NEGATIVE MG/DL
HCT VFR BLD AUTO: 43.5 % (ref 35–47)
HGB BLD-MCNC: 15.3 G/DL (ref 11.7–15.7)
HGB UR QL STRIP: ABNORMAL
KETONES UR STRIP-MCNC: NEGATIVE MG/DL
LEUKOCYTE ESTERASE UR QL STRIP: ABNORMAL
LYMPHOCYTES # BLD AUTO: 2.2 10E3/UL (ref 0.8–5.3)
LYMPHOCYTES NFR BLD AUTO: 31 %
MCH RBC QN AUTO: 30.9 PG (ref 26.5–33)
MCHC RBC AUTO-ENTMCNC: 35.2 G/DL (ref 31.5–36.5)
MCV RBC AUTO: 88 FL (ref 78–100)
MONOCYTES # BLD AUTO: 0.7 10E3/UL (ref 0–1.3)
MONOCYTES NFR BLD AUTO: 10 %
NEUTROPHILS # BLD AUTO: 3.7 10E3/UL (ref 1.6–8.3)
NEUTROPHILS NFR BLD AUTO: 52 %
NITRATE UR QL: NEGATIVE
PH UR STRIP: 7 [PH] (ref 5–7)
PLATELET # BLD AUTO: 227 10E3/UL (ref 150–450)
POTASSIUM BLD-SCNC: 4 MMOL/L (ref 3.4–5.3)
PROT SERPL-MCNC: 7.6 G/DL (ref 6.8–8.8)
PROT UR-MCNC: <0.05 G/L
PROT/CREAT 24H UR: NORMAL MG/G{CREAT}
RBC # BLD AUTO: 4.95 10E6/UL (ref 3.8–5.2)
RBC #/AREA URNS AUTO: ABNORMAL /HPF
SODIUM SERPL-SCNC: 138 MMOL/L (ref 133–144)
SP GR UR STRIP: 1.01 (ref 1–1.03)
SQUAMOUS #/AREA URNS AUTO: ABNORMAL /LPF
UROBILINOGEN UR STRIP-ACNC: 0.2 E.U./DL
WBC # BLD AUTO: 7 10E3/UL (ref 4–11)
WBC #/AREA URNS AUTO: ABNORMAL /HPF

## 2021-11-21 PROCEDURE — 81001 URINALYSIS AUTO W/SCOPE: CPT

## 2021-11-21 PROCEDURE — 80053 COMPREHEN METABOLIC PANEL: CPT

## 2021-11-21 PROCEDURE — 85025 COMPLETE CBC W/AUTO DIFF WBC: CPT

## 2021-11-21 PROCEDURE — 36415 COLL VENOUS BLD VENIPUNCTURE: CPT

## 2021-11-21 PROCEDURE — 84156 ASSAY OF PROTEIN URINE: CPT

## 2021-11-29 ENCOUNTER — VIRTUAL VISIT (OUTPATIENT)
Dept: RHEUMATOLOGY | Facility: CLINIC | Age: 46
End: 2021-11-29
Payer: COMMERCIAL

## 2021-11-29 DIAGNOSIS — M35.01 SJOGREN'S SYNDROME WITH KERATOCONJUNCTIVITIS SICCA (H): ICD-10-CM

## 2021-11-29 PROCEDURE — 99214 OFFICE O/P EST MOD 30 MIN: CPT | Mod: 95 | Performed by: INTERNAL MEDICINE

## 2021-11-29 RX ORDER — CEVIMELINE HYDROCHLORIDE 30 MG/1
30 CAPSULE ORAL DAILY
Qty: 90 CAPSULE | Refills: 3 | Status: SHIPPED | OUTPATIENT
Start: 2021-11-29 | End: 2022-02-23

## 2021-11-29 NOTE — PATIENT INSTRUCTIONS
RHEUMATOLOGY    Dr. Dylan Donato    [address] [place] [address]  Phone number: [phone]  Fax number: [phone]    Thank you for choosing [facility]!    Brittanie Rouse CMA Rheumatology

## 2021-11-29 NOTE — PROGRESS NOTES
Jesica Mooney  is a 46 year old year old female who is being evaluated via a billable video visit.      How would you like to obtain your AVS? MyChart  If the video visit is dropped, the invitation should be resent by: Text to cell phone: [phone]  Will anyone else be joining your video visit? No    Rheumatology Video Visit      Jesica Mooney MRN# [id number]   YOB: 1975 Age: 46 year old      Date of visit: 11/29/21   Hematology/Oncology: Dr. Flavia Leon and Dr. Ata Rendon  PCP: Brenda Graves    Chief Complaint   Patient presents with:  Sjogren's syndrome      Assessment and Plan     1. Sjogren's Syndrome (JAN >1:67315, SSA >8):  She has punctal plugs; restasis was reportedly ineffective when she used a sample from her ophthalmologist.  She continues following with her ophthalmologist.  Pilocarpine was tolerated but provided no benefit so she stopped it with no change in symptoms; now on Evoxac and she finds that once daily is sufficient to control her dry mouth.  Good dentition and follows with a dentist every 6 months.  No recent cavities.  Chronic illness, stable.    - Continue Evoxac 30mg daily    - Labs in 1 year: CBC, CMP, SPEP, UA, Uprotein:creatinine    2. Pulmonary Embolism: Diagnosed 12/2015 and is on anticoagulation.  Followed by Hematology.    3.  Left parotid cyst: Following with ENT and has had this drained previously; she says that she discussed with Dr. Hernandez (ENT) about having surgery for this and that is an option that she may consider in the future if the cyst continues to recur.    4.  Vaccinations: Vaccinations reviewed with Ms. Mooney.    - Influenza: up to date  - [id number]: up to date  - [id number]: up to date  - Shingrix: Up to date  - COVID-19: has received the Pfizer vaccine on 3/24/2021, 4/12/2021, 11/26/2021    Total minutes spent in evaluation with patient, documentation, , and review of pertinent studies and chart notes: 12     Thank  you for involving me in the care of the patient    Return to clinic: 1 year, earlier PRN    HPI   Jesica Mooney is a 46 year old female with history of pulmonary embolism and obesity who presents for f/u of Sjogren's Syndrome.     Today, 11/29/2021: Left parotid cyst that has been evaluated by ENT and has been drained previously; if it continues to recur then she is considering surgery to have it removed but at this point it is stable she says. Continues to use Evoxac daily, artificial tears, and follow with her dentist twice yearly.  No dental caries.    Denies fevers, chills, nausea, vomiting, constipation, diarrhea. No abdominal pain. No chest pain/pressure, palpitations, or shortness of breath. No oral or nasal sores. No neck pain. No rash.  No weight loss or night sweats.    Tobacco: None  EtOH: None  Drugs: None    ROS   12 point review of system was completed and negative except as noted in the HPI     Active Problem List     Patient Active Problem List   Diagnosis     BMI 40.0-44.9, adult (H)     CARDIOVASCULAR SCREENING; LDL GOAL LESS THAN 160     Other acute pulmonary embolism     Sjogren's syndrome (H)     Screening for cervical cancer     Past Medical History     Past Medical History:   Diagnosis Date     IUD (intrauterine device) in place     Mirena inserted 12/28/15     Past Surgical History     Past Surgical History:   Procedure Laterality Date     BIOPSY  a fews years ago    A mole was removed from by scalp     C ORAL SURGERY PROCEDURE  10/1990    wisdom teeth extracted     Allergy     Allergies   Allergen Reactions     Keflex [Cephalexin Hcl] Hives     Penicillins Hives     Betadine [Povidone Iodine] Itching     Possibly itchiness, no hives, uncertain allergic reaction.     Current Medication List     Current Outpatient Medications   Medication Sig     cevimeline (EVOXAC) 30 MG capsule Take 1 capsule (30 mg) by mouth daily     Doxylamine Succinate, Sleep, (SLEEP AID PO)      levonorgestrel  "(MIRENA) 20 MCG/24HR IUD 1 each by Intrauterine route once     propylene glycol (SYSTANE BALANCE) 0.6 % SOLN ophthalmic solution Place 1 drop into both eyes 2 times daily \"Systane Balance\"     rivaroxaban ANTICOAGULANT (XARELTO ANTICOAGULANT) 20 MG TABS tablet TAKE 1 TABLET(20 MG) BY MOUTH DAILY WITH DINNER     levonorgestrel (MIRENA) 20 MCG/24HR IUD 1 each (20 mcg) by Intrauterine route once     No current facility-administered medications for this visit.         Social History   See HPI    Family History     Family History   Problem Relation Age of Onset     Eye Disorder Mother         cataracts      Rheumatoid Arthritis Mother      Cancer - colorectal Paternal Grandmother         ?     Thyroid Disease Father      Breast Cancer Maternal Aunt         diagnosed in 30s (mother's 1/2 sister)     Cancer No family hx of      Diabetes No family hx of      Hypertension No family hx of      Cerebrovascular Disease No family hx of      Macular Degeneration No family hx of      Glaucoma No family hx of      Mother: RA    Physical Exam     Temp Readings from Last 3 Encounters:   10/20/21 98.4  F (36.9  C) (Tympanic)   11/23/20 96.8  F (36  C) (Tympanic)   09/16/19 98.6  F (37  C) (Oral)     BP Readings from Last 5 Encounters:   10/20/21 107/71   07/09/21 116/83   04/22/21 109/78   12/16/20 104/75   11/23/20 127/83     Pulse Readings from Last 1 Encounters:   10/20/21 81     Resp Readings from Last 1 Encounters:   07/09/21 18     Estimated body mass index is 44.57 kg/m  as calculated from the following:    Height as of 10/20/21: 1.613 m (5' 3.5\").    Weight as of 10/20/21: 115.9 kg (255 lb 9.6 oz).    GEN: NAD.   HEENT: MMM.  Anicteric, noninjected sclera. No obvious external lesions of the ear and nose. Hearing intact.  PULM: No increased work of breathing  MSK:  Hands and wrists without swelling.   SKIN: No rash or jaundice seen  PSYCH: Alert. Appropriate.      Labs / Imaging (select studies)     JAN  Recent Labs   Lab Test " 05/24/16  0822 04/14/16  1447 01/12/16  1457   AVA  --  10.9* 11.3*   ANAIGG >1:76205  Reference range: <1:40  (Note)  Mixed Homogeneous and Speckled pattern.  INTERPRETIVE INFORMATION: JAN by IFA, IgG  Anti-nuclear antibodies (JAN) are seen in a variety of  systemic rheumatic diseases and are determined by indirect  fluorescence assay (IFA) using HEp-2 substrate with an  IgG-specific conjugate. JAN titers less than or equal to  1:80 have variable relevance while titers greater than or  equal to 1:160 are considered clinically significant. These  antibodies may precede clinical disease onset; however,  healthy individuals and those with advanced age have been  reported to be positive for JAN. When observed, one of the  five basic patterns is reported: homogeneous,  peripheral/rim, speckled, centromere, or nucleolar. If  cytoplasmic fluorescence is observed, it is noted. IFA  methodology is subjective and has occasionally been shown  to lack sensitivity for anti-SSA/Ro antibodies.  Negative results do not necessarily rule out the presence  of SSc. If clinical nelson spicion remains, consider further  testing for U3-RNP, PM/Scl, or Th/To antibodies associated  with SSc.  Performed by [facility],  [address] [place] [id number] [phone]  [email or web address], Charles Kumar MD, Lab. Director    --   --      RNP/Sm/SSA/SSB  Recent Labs   Lab Test 05/24/16  0822   RNPIGG 0.9   SMIGG <0.2  Negative   Antibody index (AI) values reflect qualitative changes in antibody   concentration that cannot be directly associated with clinical condition or   disease state.     SSAIGG >8.0  Positive   Antibody index (AI) values reflect qualitative changes in antibody   concentration that cannot be directly associated with clinical condition or   disease state.  *   SSBIGG 0.3   SCLIGG <0.2  Negative   Antibody index (AI) values reflect qualitative changes in antibody   concentration that cannot be directly associated with clinical  condition or   disease state.       dsDNA  Recent Labs   Lab Test 11/25/16  1512 05/24/16  0822   DNA 5 6     C3/C4  Recent Labs   Lab Test 11/27/17  0846 11/25/16  1512 05/24/16  0822   L3KHDGA 98 98 99   F0MMIPE 21 21 19       Antiphospholipid Antibodies  Recent Labs   Lab Test 04/14/16  1447 01/12/16  1457   B2GPG  --  2.1   B2GPM  --  <0.9  Negative     CARG <15.0  Interpretation:  Negative   <15.0  Interpretation:  Negative     DEEPTI <12.5  Interpretation:  Negative   14.7*   LUPINT  --  Negative  (Note)  COMMENTS:  The INR is normal.  APTT ratio is elevated.  Platelet Neutralization is negative.  APTT 1:2 Mix ratio is normal.  DRVVT Screen ratio is normal.  Thrombin time is normal.  NEGATIVE TEST; A LUPUS ANTICOAGULANT WAS NOT DETECTED IN THIS  SPECIMEN WITHIN THE LIMITS OF THE TESTING REPERTOIRE.  If the clinical picture is strongly suggestive of an antiphospholipid  syndrome, recommend anticardiolipin and beta-2-glycoprotein (IgG and  IgM) antibody tests.  Platelet Neutralization and APTT 1:2 Mix ratio are suggestive of  factor deficiency.  Recommend factors 8, 9, 11 and 12 (factors VIII, IX, XI, and XII)  levels if clinically indicated.  Nimisha Lovell M.D.  [phone]  1/15/2016    INR =  1.06    Reference range: 0.86-1.14  Thrombin Time= 16.5    Reference range: 13.0-19.0 sec    APTT:       Ratio  Patient  =  1.21  1:2 Mix  =  1.05  Reference:  Negative: Less than or equal to 1.16  Positive: Greater than or equal to 1.17    Platelet Neutralization (second s):  PTT Buffer-PTT Platelet Lysate =  0  Reference:  Negative: Less than or equal to 0  Positive: Greater than or equal to 1     DILUTE BRANDY VIPER VENOM TEST:  Screen Ratio = 1.15   Normal is less than 1.21           CBC  Recent Labs   Lab Test 11/21/21  1016 11/23/20  0853 11/25/19  0837 11/26/18  0830   WBC 7.0 7.6 7.0 7.3   RBC 4.95 4.88 5.13 4.91   HGB 15.3 15.0 15.7 15.1   HCT 43.5 43.6 46.9 44.4   MCV 88 89 91 90   RDW 12.8 12.7 12.8  12.6    254 248 246   MCH 30.9 30.7 30.6 30.8   MCHC 35.2 34.4 33.5 34.0   NEUTROPHIL 52 48.2 47.9 44.9   LYMPH 31 31.7 33.7 39.3   MONOCYTE 10 12.3 9.7 9.4   EOSINOPHIL 7 7.4 8.3 6.1   BASOPHIL 1 0.4 0.4 0.3   ANEU  --  3.7 3.3 3.3   ALYM  --  2.4 2.4 2.9   CARLY  --  0.9 0.7 0.7   AEOS  --  0.6 0.6 0.4   ABAS  --  0.0 0.0 0.0   ANEUTAUTO 3.7  --   --   --    ALYMPAUTO 2.2  --   --   --    AMONOAUTO 0.7  --   --   --    AEOSAUTO 0.5  --   --   --    ABSBASO 0.0  --   --   --      CMP  Recent Labs   Lab Test 11/21/21  1016 11/23/20  0853 11/25/19  0837 11/26/18  0830 11/27/17  0846 11/25/16  1512    138 137 138 137 139   POTASSIUM 4.0 4.0 4.2 4.1 4.0 3.8   CHLORIDE 107 106 108 106 105 105   CO2 27 29 28 27 29 30   ANIONGAP 4 3 1* 5 3 4   GLC 87 89 98 96 88 97   BUN 12 14 13 14 14 12   CR 0.73 0.84 0.78 0.81 0.90 0.98   GFRESTIMATED >90 83 >90 77 68 62   GFRESTBLACK  --  >90 >90 >90 82 76   BROOKS 9.0 9.0 9.6 9.3 9.2 8.9   BILITOTAL 0.5 0.6 0.4 0.3 0.4 0.4   ALBUMIN 3.2* 3.4 3.6 3.6 3.8 3.6   PROTTOTAL 7.6 7.4 7.9 8.1 8.5 8.1   ALKPHOS 59 69 61 63 65 66   AST 26 21 22 25 26 30   ALT 47 46 36 35 42 56*     Calcium/VitaminD  Recent Labs   Lab Test 11/21/21  1016 11/23/20  0853 11/25/19  0837   BROOKS 9.0 9.0 9.6     ESR/CRP  Recent Labs   Lab Test 11/27/17  0846 11/25/16  1512 05/24/16  0822   SED 15 23* 17   CRP <2.9 <2.9 <2.9     CK/Aldolase  Recent Labs   Lab Test 11/25/16  1512 05/24/16  0822   CKT 96 72     Hepatitis C  Recent Labs   Lab Test 11/23/20  0853   HCVAB Nonreactive     HIV Screening  Recent Labs   Lab Test 11/23/20  0853   HIAGAB Nonreactive     UA  Recent Labs   Lab Test 11/21/21  1016 11/23/20  0906 11/25/19  0840 11/26/18  0838   COLOR Yellow Yellow Yellow Yellow   APPEARANCE Clear Clear Clear Clear   URINEGLC Negative Negative Negative Negative   URINEBILI Negative Negative Negative Negative   SG 1.015 1.015 1.015 1.010   URINEPH 7.0 6.0 6.5 6.5   PROTEIN Negative Negative Negative Negative    UROBILINOGEN 0.2 0.2 0.2 0.2   NITRITE Negative Negative Negative Negative   UBLD Trace* Small* Small* Trace*   LEUKEST Trace* Trace* Negative Trace*   WBCU 0-5 0 - 5 0 - 5 0 - 5   RBCU 0-2 O - 2 O - 2 2-5*   SQUAMOUSEPI  --  Moderate* Moderate* Few   BACTERIA  --   --  Few*  --    MUCOUS  --  Present*  --   --      Urine Microscopic  Recent Labs   Lab Test 11/21/21  1016 11/23/20  0906 11/25/19  0840 11/26/18  0838   WBCU 0-5 0 - 5 0 - 5 0 - 5   RBCU 0-2 O - 2 O - 2 2-5*   SQUAMOUSEPI  --  Moderate* Moderate* Few   BACTERIA  --   --  Few*  --    MUCOUS  --  Present*  --   --      Urine Protein  Recent Labs   Lab Test 11/21/21  1016 11/23/20  0906 11/25/19  0840 11/26/18  0838   UTP <0.05 <0.05 0.06 <0.05   UTPG  --  Unable to calculate due to low value 0.07 Unable to calculate due to low value   UCRR 28 98 81 46     Immunization History     Immunization History   Administered Date(s) Administered     COVID-19,PF,Pfizer (12+ Yrs) 03/24/2021, 04/12/2021     Influenza (IIV3) PF 10/02/2012     Influenza Intranasal Vaccine 4 valent (FluMist) 10/11/2013, 10/16/2014, 10/11/2016     Influenza Vaccine IM > 6 months Valent IIV4 (Alfuria,Fluzone) 10/05/2015, 10/13/2017, 10/15/2018, 09/16/2019, 10/20/2021     Pneumo Conj 13-V (2010&after) 03/12/2019     Pneumococcal 23 valent 09/03/2019     TDAP Vaccine (Adacel) 12/03/2018     Tdap (Adacel,Boostrix) 01/17/2008     Twinrix A/B 12/17/2012, 01/16/2013, 06/18/2013     Zoster vaccine recombinant adjuvanted (SHINGRIX) 03/12/2019, 09/03/2019          Chart documentation done in part with Dragon Voice recognition Software. Although reviewed after completion, some word and grammatical error may remain.      Video-Visit Details    Type of service:  Video Visit    Video Start Time: 11:17 AM    Video End Time:11:25 AM    Originating Location (pt. Location): [place], [place]    Distant Location (provider location):  [facility] in [place], [place]    Platform used for Video Visit:  Stefano Donato MD

## 2021-12-01 ENCOUNTER — MYC MEDICAL ADVICE (OUTPATIENT)
Dept: OTOLARYNGOLOGY | Facility: CLINIC | Age: 46
End: 2021-12-01
Payer: COMMERCIAL

## 2021-12-02 NOTE — TELEPHONE ENCOUNTER
appt made. Dr Hernandez how much time do you need? I did 45    Anna Marie LAKHANI RN Specialty Triage 12/2/2021 9:58 AM

## 2021-12-23 ENCOUNTER — OFFICE VISIT (OUTPATIENT)
Dept: OTOLARYNGOLOGY | Facility: CLINIC | Age: 46
End: 2021-12-23
Payer: COMMERCIAL

## 2021-12-23 VITALS
SYSTOLIC BLOOD PRESSURE: 119 MMHG | HEART RATE: 86 BPM | OXYGEN SATURATION: 97 % | DIASTOLIC BLOOD PRESSURE: 79 MMHG | RESPIRATION RATE: 16 BRPM

## 2021-12-23 DIAGNOSIS — K11.6 PAROTID CYST: Primary | ICD-10-CM

## 2021-12-23 PROCEDURE — 99212 OFFICE O/P EST SF 10 MIN: CPT | Mod: 25 | Performed by: OTOLARYNGOLOGY

## 2021-12-23 PROCEDURE — 10160 PNXR ASPIR ABSC HMTMA BULLA: CPT | Performed by: OTOLARYNGOLOGY

## 2021-12-23 NOTE — LETTER
12/23/2021         RE: Jesica Mooney  [address] [facility] [address]        Dear Colleague,    Thank you for referring your patient, Jesica Mooney, to the [facility]. Please see a copy of my visit note below.    Chief Complaint - left parotid cyst    History of Present Illness - Jesica Mooney is a 46 year old female who returns with a left parotid cyst. It has been present for a year or more. She had one episode of painful parotid or cheek swelling about 10 years ago too. She has a h/o Sjogren's, diagnosis 6/2016, but she has had symptoms prior to that. CT neck 12/10/2020 images reviewed showing a left parotid cystic mass measuring approximately 2.7 x 2.4 x 2.5 cm. No lymphadenopathy. I performed a core needle biopsy on this (12/2020). It was negative and she had a lot of fluid that poured out at the time of biopsy.  I carefully reviewed her CT neck and her ultrasound images.  This information in combination with a very thin copious fluid collection on core needle biopsy leads me to believe that this is a simple cyst or a lymphoepithelial cyst.  She has Sjogren's and the anatomy of her parotid gland on the CT suggests Sjogren's with a cyst.  I see no adjacent nodule or mass suggesting malignancy or other tumor. The cyst enlarged again with fluid, and I also drained this 7/2021.    She returns and notes slowly worsening left parotid swelling at her cyst site. No infection or pain. No redness. No numbness. No other lumps or bumps. no facial weakness. She would like the cyst aspirated again.     Past Medical History -   Patient Active Problem List   Diagnosis     BMI 40.0-44.9, adult (H)     CARDIOVASCULAR SCREENING; LDL GOAL LESS THAN 160     Other acute pulmonary embolism     Sjogren's syndrome (H)     Screening for cervical cancer       Current Medications -   Current Outpatient Medications:      cevimeline (EVOXAC) 30 MG capsule, Take 1 capsule (30 mg) by mouth daily,  "Disp: 90 capsule, Rfl: 3     Doxylamine Succinate, Sleep, (SLEEP AID PO), , Disp: , Rfl:      levonorgestrel (MIRENA) 20 MCG/24HR IUD, 1 each (20 mcg) by Intrauterine route once, Disp: , Rfl:      levonorgestrel (MIRENA) 20 MCG/24HR IUD, 1 each by Intrauterine route once, Disp: , Rfl:      propylene glycol (SYSTANE BALANCE) 0.6 % SOLN ophthalmic solution, Place 1 drop into both eyes 2 times daily \"Systane Balance\", Disp: 1 Bottle, Rfl:      rivaroxaban ANTICOAGULANT (XARELTO ANTICOAGULANT) 20 MG TABS tablet, TAKE 1 TABLET(20 MG) BY MOUTH DAILY WITH DINNER, Disp: 30 tablet, Rfl: 11    Allergies -   Allergies   Allergen Reactions     Keflex [Cephalexin Hcl] Hives     Penicillins Hives     Betadine [Povidone Iodine] Itching     Possibly itchiness, no hives, uncertain allergic reaction.       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   Lifestyle     Physical activity     Days per week: Not on file     Minutes per session: Not on file     Stress: Not on file   Relationships     Social connections     Talks on phone: Not on file     Gets together: Not on file     Attends Islam service: Not on file     Active member of club or organization: Not on " file     Attends meetings of clubs or organizations: Not on file     Relationship status: Not on file     Intimate partner violence     Fear of current or ex partner: Not on file     Emotionally abused: Not on file     Physically abused: Not on file     Forced sexual activity: Not on file   Other Topics Concern     Parent/sibling w/ CABG, MI or angioplasty before 65F 55M? No      Service Yes     Blood Transfusions No     Caffeine Concern No     Occupational Exposure No     Hobby Hazards No     Sleep Concern No     Stress Concern No     Weight Concern No     Special Diet No     Back Care No     Exercise Yes     Bike Helmet No     Seat Belt Yes     Self-Exams Yes   Social History Narrative     Not on file       Family History -   Family History   Problem Relation Age of Onset     Eye Disorder Mother         cataracts      Rheumatoid Arthritis Mother      Cancer - colorectal Paternal Grandmother         ?     Thyroid Disease Father      Breast Cancer Maternal Aunt         diagnosed in 30s (mother's 1/2 sister)     Cancer No family hx of      Diabetes No family hx of      Hypertension No family hx of      Cerebrovascular Disease No family hx of      Macular Degeneration No family hx of      Glaucoma No family hx of        Physical Exam  General - The patient is in no distress.  Alert and oriented x3, answers questions and cooperates with examination appropriately.   Voice and Breathing - The patient was breathing comfortably without the use of accessory muscles. There was no wheezing, stridor, or stertor.  The patients voice was clear and strong.  Head and Face - Normocephalic and atraumatic.    Eyes - Extraocular movements intact. Sclera were not icteric or injected, conjunctiva were pink and moist.  Neurologic - Cranial nerves II-XII are grossly intact. Specifically, the facial nerve is intact, House-Brackmann grade 1 of 6.   Neck - The left parotid gland has a 2 cm cyst, somewhat firm of the superficial gland  just anterior to the tragus and deep to the lobule. No tenderness. The rest of the occipital, submental, submandibular, internal jugular chain, and supraclavicular nodes did not demonstrate any abnormal lymph nodes or masses.     PROCEDURE - I explained the risks of needle aspiration including infection, bleeding, recurrence, the need for future procedures including removal. She agreed and wished to proceed. I cleansed the skin with alcohol (she has a betadine allergey). I injected 1% lidocaine, 1:100,000 epinephrine (1 ml) into the soft just anterior to the left ear lobule. I then used a 16 gauge needle and aspirated 7 ml of turbid cyst fluid. Minimal bleeding. Bacitracin and a bandaid was placed.     A/P - Jesica Mooney is a 46 year old female with a left parotid cyst. She has Sjogren's disease. I took a core needle biopsy 12/2020 and drained cyst fluid. I then drained it again 7/2021. She was doing well until recently she had worsening swelling at the site. It has filled to about 2 cm. I drained this again today at her request (7 ml). Recheck in 6 months, sooner if this gets infected, enlarges faster, other lumps or masses appear. I offered surgical excision for definitive treatment, but she deferred.     Cirilo Hernandez MD  Otolaryngology  [facility]        Again, thank you for allowing me to participate in the care of your patient.        Sincerely,        Cirilo Hernandez MD

## 2021-12-23 NOTE — PROGRESS NOTES
Chief Complaint - left parotid cyst    History of Present Illness - Jesica Mooney is a 46 year old female who returns with a left parotid cyst. It has been present for a year or more. She had one episode of painful parotid or cheek swelling about 10 years ago too. She has a h/o Sjogren's, diagnosis 6/2016, but she has had symptoms prior to that. CT neck 12/10/2020 images reviewed showing a left parotid cystic mass measuring approximately 2.7 x 2.4 x 2.5 cm. No lymphadenopathy. I performed a core needle biopsy on this (12/2020). It was negative and she had a lot of fluid that poured out at the time of biopsy.  I carefully reviewed her CT neck and her ultrasound images.  This information in combination with a very thin copious fluid collection on core needle biopsy leads me to believe that this is a simple cyst or a lymphoepithelial cyst.  She has Sjogren's and the anatomy of her parotid gland on the CT suggests Sjogren's with a cyst.  I see no adjacent nodule or mass suggesting malignancy or other tumor. The cyst enlarged again with fluid, and I also drained this 7/2021.    She returns and notes slowly worsening left parotid swelling at her cyst site. No infection or pain. No redness. No numbness. No other lumps or bumps. no facial weakness. She would like the cyst aspirated again.     Past Medical History -   Patient Active Problem List   Diagnosis     BMI 40.0-44.9, adult (H)     CARDIOVASCULAR SCREENING; LDL GOAL LESS THAN 160     Other acute pulmonary embolism     Sjogren's syndrome (H)     Screening for cervical cancer       Current Medications -   Current Outpatient Medications:      cevimeline (EVOXAC) 30 MG capsule, Take 1 capsule (30 mg) by mouth daily, Disp: 90 capsule, Rfl: 3     Doxylamine Succinate, Sleep, (SLEEP AID PO), , Disp: , Rfl:      levonorgestrel (MIRENA) 20 MCG/24HR IUD, 1 each (20 mcg) by Intrauterine route once, Disp: , Rfl:      levonorgestrel (MIRENA) 20 MCG/24HR IUD, 1 each by  "Intrauterine route once, Disp: , Rfl:      propylene glycol (SYSTANE BALANCE) 0.6 % SOLN ophthalmic solution, Place 1 drop into both eyes 2 times daily \"Systane Balance\", Disp: 1 Bottle, Rfl:      rivaroxaban ANTICOAGULANT (XARELTO ANTICOAGULANT) 20 MG TABS tablet, TAKE 1 TABLET(20 MG) BY MOUTH DAILY WITH DINNER, Disp: 30 tablet, Rfl: 11    Allergies -   Allergies   Allergen Reactions     Keflex [Cephalexin Hcl] Hives     Penicillins Hives     Betadine [Povidone Iodine] Itching     Possibly itchiness, no hives, uncertain allergic reaction.       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   Lifestyle     Physical activity     Days per week: Not on file     Minutes per session: Not on file     Stress: Not on file   Relationships     Social connections     Talks on phone: Not on file     Gets together: Not on file     Attends Roman [employer] service: Not on file     Active member of club or organization: Not on file     Attends meetings of clubs or organizations: Not on file     Relationship status: Not on file     Intimate partner violence     Fear of current or ex partner: Not on file     Emotionally abused: Not on file     Physically abused: Not on file "     Forced sexual activity: Not on file   Other Topics Concern     Parent/sibling w/ CABG, MI or angioplasty before 65F 55M? No      Service Yes     Blood Transfusions No     Caffeine Concern No     Occupational Exposure No     Hobby Hazards No     Sleep Concern No     Stress Concern No     Weight Concern No     Special Diet No     Back Care No     Exercise Yes     Bike Helmet No     Seat Belt Yes     Self-Exams Yes   Social History Narrative     Not on file       Family History -   Family History   Problem Relation Age of Onset     Eye Disorder Mother         cataracts      Rheumatoid Arthritis Mother      Cancer - colorectal Paternal Grandmother         ?     Thyroid Disease Father      Breast Cancer Maternal Aunt         diagnosed in 30s (mother's 1/2 sister)     Cancer No family hx of      Diabetes No family hx of      Hypertension No family hx of      Cerebrovascular Disease No family hx of      Macular Degeneration No family hx of      Glaucoma No family hx of        Physical Exam  General - The patient is in no distress.  Alert and oriented x3, answers questions and cooperates with examination appropriately.   Voice and Breathing - The patient was breathing comfortably without the use of accessory muscles. There was no wheezing, stridor, or stertor.  The patients voice was clear and strong.  Head and Face - Normocephalic and atraumatic.    Eyes - Extraocular movements intact. Sclera were not icteric or injected, conjunctiva were pink and moist.  Neurologic - Cranial nerves II-XII are grossly intact. Specifically, the facial nerve is intact, House-Brackmann grade 1 of 6.   Neck - The left parotid gland has a 2 cm cyst, somewhat firm of the superficial gland just anterior to the tragus and deep to the lobule. No tenderness. The rest of the occipital, submental, submandibular, internal jugular chain, and supraclavicular nodes did not demonstrate any abnormal lymph nodes or masses.     PROCEDURE - I  explained the risks of needle aspiration including infection, bleeding, recurrence, the need for future procedures including removal. She agreed and wished to proceed. I cleansed the skin with alcohol (she has a betadine allergey). I injected 1% lidocaine, 1:100,000 epinephrine (1 ml) into the soft just anterior to the left ear lobule. I then used a 16 gauge needle and aspirated 7 ml of turbid cyst fluid. Minimal bleeding. Bacitracin and a bandaid was placed.     A/P - Jesica TAMIKA Mooney is a 46 year old female with a left parotid cyst. She has Sjogren's disease. I took a core needle biopsy 12/2020 and drained cyst fluid. I then drained it again 7/2021. She was doing well until recently she had worsening swelling at the site. It has filled to about 2 cm. I drained this again today at her request (7 ml). Recheck in 6 months, sooner if this gets infected, enlarges faster, other lumps or masses appear. I offered surgical excision for definitive treatment, but she deferred.     Cirilo Hrenandez MD  Otolaryngology  [facility]

## 2022-02-21 DIAGNOSIS — M35.01 SJOGREN'S SYNDROME WITH KERATOCONJUNCTIVITIS SICCA (H): ICD-10-CM

## 2022-02-23 RX ORDER — CEVIMELINE HYDROCHLORIDE 30 MG/1
30 CAPSULE ORAL DAILY
Qty: 90 CAPSULE | Refills: 3 | Status: SHIPPED | OUTPATIENT
Start: 2022-02-23 | End: 2022-11-14

## 2022-04-18 ENCOUNTER — VIRTUAL VISIT (OUTPATIENT)
Dept: HEMATOLOGY | Facility: CLINIC | Age: 47
End: 2022-04-18
Attending: PHYSICIAN ASSISTANT
Payer: COMMERCIAL

## 2022-04-18 VITALS — WEIGHT: 245 LBS | BODY MASS INDEX: 42.72 KG/M2

## 2022-04-18 DIAGNOSIS — Z79.01 CHRONIC ANTICOAGULATION: ICD-10-CM

## 2022-04-18 PROCEDURE — 99213 OFFICE O/P EST LOW 20 MIN: CPT | Mod: 95 | Performed by: PHYSICIAN ASSISTANT

## 2022-04-18 NOTE — PROGRESS NOTES
Spoke with pt and verified medications and allergies. Thanked pt for her time to go over this.    Melissa Cooley, [facility]    [facility]  [phone]

## 2022-04-18 NOTE — PATIENT INSTRUCTIONS
[facility]  [facility]  [facility], [place] [id number]  Main: [phone], Fax: [phone]    Jesica,  It was a pleasure chatting with you today.  Thank you for allowing us to be involved in your care.  Please let us know if there is anything else we can do for you, so that we can be sure you are leaving completely satisfied with your care experience.      Please stay on your blood thinner:  Xarelto 20mg once daily. Take with food and ideally within a 2 hour window each day. Setting an alarm reminder on your phone can be helpful for those who have a tough time remembering to take their medication daily.  Please call us with any bleeding issues that you may have.      Patient Education & Resources:  For additional information, please see the following web links:  www.stoptheclot.org, www.clotconnect.org.    Call the [facility]  at [phone].     -If surgeries or procedures are planned (for holding instructions).     -If off anticoagulation, please call during high risk times (long-distance travel, broken bones or trauma, immobilization, surgery, pregnancy, or taking estrogen).     -Any new symptoms of DVT (deep vein thrombosis) or PE (pulmonary embolism)    -pain     -swelling     -redness    -warmth    -shortness of breath    -chest pain    -coughing up blood    We would like a provider on our team to see you at least annually for optimal care and to allow us to continue to prescribe for you.     Return to clinic in  1 year - sooner if you are having any concerns.      Your nurse clinician is Stephanie Lamb, MSN, RN, [phone].   If they are unavailable and you have immediate concerns, please call [phone] and ask for a nurse.         Brittni Trejo, MPH, PA-C  [facility]

## 2022-04-18 NOTE — PROGRESS NOTES
"      [facility]  [facility], [place] [address]  Main: [phone], Fax: [phone]      Telephone Virtual Visit Note:    Patient: Jesica Mooney  MRN: [id number]  : 1975  OWEN: 2022    Due to the ongoing COVID-19 outbreak, this visit was conducted by telephone, with the patient's approval.    Video visit attempted however changed to telephone visit due to technical malfunction on provider end.     Reason of today's visit:  Annual follow up, On long term anticoagulation, Hx of Antithrombin deficiency and pulmonary embolism.     Clinical History Summary:  Jesica \"Gretchen\"Vinicio is a 46 year old female with past medical history pertinent for pulmonary embolism while on chronic (>20 years) estrogen containing oral contraceptive pills in 2015. She was started on Xarelto and her OCPs were discontinued. She ultimately ended up undergoing a hypercoagulable workup that showed high-titer positive JAN, persistently elevated D-dimer around 2.5 and persistent mildly reduced levels of antithrombin III (70s). Her lupus inhibitor, anticardiolipin antibodies, beta-2-glycoprotein antibodies, protein C and S levels, factor V Leiden and prothrombin gene mutation testing was negative. She was seen by Rheumatology and diagnosed with Sjogren's. She did have gene sequencing for antithrombin III deficiency and had VUS of SERPINC1 gene likely to be pathogenic in terms of clotting risk thus she was recommended to continue on long term anticoagulation.     Interim History:  Today, Gretchen reports that she is doing very well. She denies any bleeding complications nor further venous thromboembolic events. She tolerates the Xarelto well without any concerns. She takes it at dinner with food. She is on the $10 copay card so the cost is reasonable. She notes no increased nuisance bleeding. She did have a mechanical fall last week and had some ecchymosis " of her legs but no hematoma formation. She did not injure her head or have loss of consciousness.     She did have her IUD exchanged in 10/2021 and tolerated that well. She did hold a single dose of Xarelto for the procedure and had no bleeding complications. She does not get her period with the IUD. She had a parotid cyst drained by ENT this year without complications. She notes her Sjogren's is stable and controlled.     Her children are 19 (son) and 16 (daughter). Neither have had genetic testing nor history of clotting events. Daughter is not on any OCPs.     ROS:  Denies any bleeding complications. Specifically, no frequent epistaxis. No issues with oral mucosal bleeding. Denies any hematuria or blood in stools. Denies any shortness of breath.     Medication, Allergies and PmHx:  All have been reviewed by this writer in the electronic medical records.    Social History and Family History:  Deferred.  See HPI    Objective:  None - telephone visit.       Labs:  Creatinine   Date Value Ref Range Status   2021 0.73 0.52 - 1.04 mg/dL Final   2020 0.84 0.52 - 1.04 mg/dL Final     Liver Function Studies - Recent Labs   Lab Test 21  1016   PROTTOTAL 7.6   ALBUMIN 3.2*   BILITOTAL 0.5   ALKPHOS 59   AST 26   ALT 47      Latest Reference Range & Units 16 14:57 16 14:47   Antithrombin III Chromogenic 85 - 135 % 73 (L) [1] 77 (L) (C) [2]         Imagin2016 BLE US                                                         IMPRESSION: No evidence of deep venous thrombosis in either lower  Extremity.        Assessment:  In summary, Gretchen is a 46 year old female with history of pulmonary embolism in  that was associated with long term estrogen use without additional triggers. Hypercoagulable evaluation was remarkable for persistently mildly reduced levels of antithrombin III. Genetic testing showed SERPINC1 gene mutation that was felt to be likely pathogenic (VUS) thus she was recommended  to continue on long term anticoagulation. She remains on Xarelto 20mg once daily without any bleeding complications or recurrent venous thrombembolic events.     Diagnosis:  1. History of pulmonary embolism 12/2015 - on long term OCPs since discontinued. Remains on Xarelto 20mg once daily.   2. Mild antithrombin III deficiency, with SERPINC gene mutation (VUS)     Plan:  Gretchen remains a good candidate for long term anticoagulation with Xarelto. I will refill her prescription for the next year. We discussed that there is no generic at the moment however this could change in the coming years. We would reach out to her if one became available.  She currently prefers to fill at her local [facility]. She will contact us should she wish to change to the [facility] pharmacy.    She will contact the office should she have any scheduled procedures/operations.     RTC in 1 year, ok for virtual visit if patient prefers.       20 minutes spent on the date of the encounter doing chart review, review of outside records, review of test results, interpretation of tests, patient visit and documentation          Telephone-Visit Details:  Type of service:  Telephone Visit  Telephone Start Time: 12:35pm  Video End Time (time video stopped): 12:51pm  Originating Location (pt. Location): Home  Distant Location (provider location): Home  Mode of Communication: [email or web address] Telephone [place]      Brittni Trejo, MPH, PA-C  [facility]

## 2022-06-02 NOTE — PROGRESS NOTES
"  {PROVIDER CHARTING PREFERENCE:915710}    Alexia Godinez is a 46 year old who presents for the following health issues {ACCOMPANIED BY STATEMENT (Optional):852353}    HPI     Genitourinary - Female  Onset/Duration: ***  Description:   Painful urination (Dysuria): {.:688099::\"no\"}           Frequency: {.:058808::\"no\"}  Blood in urine (Hematuria): {.:811017::\"no\"}  Delay in urine (Hesitency): {.:714159::\"no\"}  Intensity: {.:865435}  Progression of Symptoms:  {.:747395}  Accompanying Signs & Symptoms:  Fever/chills: {.:261938::\"no\"}  Flank pain: {.:509286::\"no\"}  Nausea and vomiting: {.:498728::\"no\"}  Vaginal symptoms: {VAGINAL SYMPTOMS:419752::\"none\"}  Abdominal/Pelvic Pain: {.:303066::\"no\"}  History:   History of frequent UTI s: {.:830049::\"no\"}  History of kidney stones: {.:064763::\"no\"}  Sexually Active: {.:640272::\"no\"}  Possibility of pregnancy: { :363048::\"No\"}  Precipitating or alleviating factors: {NONE DEFAULTED:751243::\"None\"}  Therapies tried and outcome: {UTI sx treat:446740::\" *** \"}    {additonal problems for provider to add (Optional):398401}    Review of Systems   {ROS COMP (Optional):881231}      Objective    There were no vitals taken for this visit.  There is no height or weight on file to calculate BMI.  Physical Exam   {Exam List (Optional):359342}    {Diagnostic Test Results (Optional):253874}    {AMBULATORY ATTESTATION (Optional):576263}        "

## 2022-06-06 ENCOUNTER — OFFICE VISIT (OUTPATIENT)
Dept: OBGYN | Facility: CLINIC | Age: 47
End: 2022-06-06
Payer: COMMERCIAL

## 2022-06-06 ENCOUNTER — TELEPHONE (OUTPATIENT)
Dept: UROLOGY | Facility: CLINIC | Age: 47
End: 2022-06-06

## 2022-06-06 VITALS
WEIGHT: 241.8 LBS | TEMPERATURE: 97.9 F | DIASTOLIC BLOOD PRESSURE: 80 MMHG | SYSTOLIC BLOOD PRESSURE: 118 MMHG | BODY MASS INDEX: 41.28 KG/M2 | HEART RATE: 68 BPM | OXYGEN SATURATION: 95 % | HEIGHT: 64 IN

## 2022-06-06 DIAGNOSIS — R31.0 GROSS HEMATURIA: Primary | ICD-10-CM

## 2022-06-06 LAB
ALBUMIN SERPL-MCNC: 3.5 G/DL (ref 3.4–5)
ALBUMIN UR-MCNC: 100 MG/DL
ALP SERPL-CCNC: 59 U/L (ref 40–150)
ALT SERPL W P-5'-P-CCNC: 46 U/L (ref 0–50)
ANION GAP SERPL CALCULATED.3IONS-SCNC: 3 MMOL/L (ref 3–14)
APPEARANCE UR: ABNORMAL
AST SERPL W P-5'-P-CCNC: 27 U/L (ref 0–45)
BACTERIA #/AREA URNS HPF: ABNORMAL /HPF
BASOPHILS # BLD AUTO: 0.1 10E3/UL (ref 0–0.2)
BASOPHILS NFR BLD AUTO: 1 %
BILIRUB SERPL-MCNC: 0.4 MG/DL (ref 0.2–1.3)
BILIRUB UR QL STRIP: NEGATIVE
BUN SERPL-MCNC: 12 MG/DL (ref 7–30)
CALCIUM SERPL-MCNC: 9.3 MG/DL (ref 8.5–10.1)
CHLORIDE BLD-SCNC: 107 MMOL/L (ref 94–109)
CO2 SERPL-SCNC: 29 MMOL/L (ref 20–32)
COLOR UR AUTO: ABNORMAL
CREAT SERPL-MCNC: 0.81 MG/DL (ref 0.52–1.04)
EOSINOPHIL # BLD AUTO: 0.6 10E3/UL (ref 0–0.7)
EOSINOPHIL NFR BLD AUTO: 9 %
ERYTHROCYTE [DISTWIDTH] IN BLOOD BY AUTOMATED COUNT: 13.1 % (ref 10–15)
GFR SERPL CREATININE-BSD FRML MDRD: 90 ML/MIN/1.73M2
GLUCOSE BLD-MCNC: 108 MG/DL (ref 70–99)
GLUCOSE UR STRIP-MCNC: NEGATIVE MG/DL
HCT VFR BLD AUTO: 43.8 % (ref 35–47)
HGB BLD-MCNC: 14.9 G/DL (ref 11.7–15.7)
HGB UR QL STRIP: ABNORMAL
KETONES UR STRIP-MCNC: 15 MG/DL
LEUKOCYTE ESTERASE UR QL STRIP: ABNORMAL
LYMPHOCYTES # BLD AUTO: 2 10E3/UL (ref 0.8–5.3)
LYMPHOCYTES NFR BLD AUTO: 29 %
MCH RBC QN AUTO: 31 PG (ref 26.5–33)
MCHC RBC AUTO-ENTMCNC: 34 G/DL (ref 31.5–36.5)
MCV RBC AUTO: 91 FL (ref 78–100)
MONOCYTES # BLD AUTO: 0.7 10E3/UL (ref 0–1.3)
MONOCYTES NFR BLD AUTO: 10 %
NEUTROPHILS # BLD AUTO: 3.5 10E3/UL (ref 1.6–8.3)
NEUTROPHILS NFR BLD AUTO: 51 %
NITRATE UR QL: NEGATIVE
PH UR STRIP: 5.5 [PH] (ref 5–7)
PLATELET # BLD AUTO: 229 10E3/UL (ref 150–450)
POTASSIUM BLD-SCNC: 4.1 MMOL/L (ref 3.4–5.3)
PROT SERPL-MCNC: 7.8 G/DL (ref 6.8–8.8)
RBC # BLD AUTO: 4.81 10E6/UL (ref 3.8–5.2)
RBC #/AREA URNS AUTO: >100 /HPF
SODIUM SERPL-SCNC: 139 MMOL/L (ref 133–144)
SP GR UR STRIP: >=1.03 (ref 1–1.03)
SQUAMOUS #/AREA URNS AUTO: ABNORMAL /LPF
UROBILINOGEN UR STRIP-ACNC: 0.2 E.U./DL
WBC # BLD AUTO: 6.9 10E3/UL (ref 4–11)
WBC #/AREA URNS AUTO: ABNORMAL /HPF

## 2022-06-06 PROCEDURE — 81001 URINALYSIS AUTO W/SCOPE: CPT | Performed by: NURSE PRACTITIONER

## 2022-06-06 PROCEDURE — 85025 COMPLETE CBC W/AUTO DIFF WBC: CPT | Performed by: NURSE PRACTITIONER

## 2022-06-06 PROCEDURE — 36415 COLL VENOUS BLD VENIPUNCTURE: CPT | Performed by: NURSE PRACTITIONER

## 2022-06-06 PROCEDURE — 87086 URINE CULTURE/COLONY COUNT: CPT | Performed by: NURSE PRACTITIONER

## 2022-06-06 PROCEDURE — 99213 OFFICE O/P EST LOW 20 MIN: CPT | Performed by: NURSE PRACTITIONER

## 2022-06-06 PROCEDURE — 80053 COMPREHEN METABOLIC PANEL: CPT | Performed by: NURSE PRACTITIONER

## 2022-06-06 ASSESSMENT — PAIN SCALES - GENERAL: PAINLEVEL: NO PAIN (0)

## 2022-06-06 NOTE — PATIENT INSTRUCTIONS
If you have any questions regarding your visit, Please contact your care team.     [facility]: [phone]  To Schedule an Appointment 24/7  Call: [phone] HOU[facility] TELEPHONE NUMBER     Brenda Graves- APRN CNP      Yolanda Tan-CULLEN Shelley-RN  Evy Edwards-Surgery Scheduler  [facility]-Surgery Scheduler         Monday 7:30 am-5:00 pm    Tuesday 8:00 am-4:00 pm    Wednesday 7:30 am-4:00 pm  [place]    Thursday 8:00 am-11:00 am    Friday 7:30 am-4:00 pm [address] [place] [address] [phone] ask for [facility]  [phone] Fax    Imaging Scheduling all locations  [phone]     [facility]  [address] [place] [address]   [place], [place] [id number] [phone]         Urgent Care locations:  [facility]   Monday-Friday  10 am - 8 pm  Saturday and Sunday   9 am - 5 pm     [phone]   If you need a medication refill, please contact your pharmacy. Please allow 3 business days for your refill to be completed.  As always, Thank you for trusting us with your healthcare needs!      see additional instructions from your care team below

## 2022-06-06 NOTE — TELEPHONE ENCOUNTER
[facility] Call Center    Phone Message    May a detailed message be left on voicemail: yes     Reason for Call: Other: Patient is being referred to Urology for Gross hematuria, per protocols send TE.Please triage and call patient to schedule.     Action Taken: Message routed to:  [facility] Urology     Travel Screening: Not Applicable

## 2022-06-06 NOTE — PROGRESS NOTES
"  Assessment & Plan     Gross hematuria  Reviewed UA in clinic, will check culture to rule out infection. Discussed possible etiologies and check labs below. Plan CT scan to evaluate for possible stone, then recommend follow up with Urology after the CT and labs resulted for additional evaluation/management as this has been ongoing for close to 2 months now. Patient is given an opportunity to ask questions and have them answered.  - UA with Microscopic  - Urine Culture  - Urine Microscopic Exam  - CBC with platelets and differential; Future  - Comprehensive metabolic panel (BMP + Alb, Alk Phos, ALT, AST, Total. Bili, TP); Future  - CT Abdomen Pelvis w/o Contrast; Future  - Adult Urology Referral; Future  - CBC with platelets and differential  - Comprehensive metabolic panel (BMP + Alb, Alk Phos, ALT, AST, Total. Bili, TP)    JUDSON Pizarro CNP  [facility] RAPHAEL Godinez is a 46 year old who presents for the following health issues     HPI     Blood in urine    Patient presents with blood in the urine since the 3rd week of April. Denies vaginal bleeding. Has a Mirena IUD in place. No other abnormal symptoms patient has noticed. Denies dysuria, urinary frequency, urgency, incomplete emptying, vaginal symptoms. Denies pelvic or abdominal pain, fever, nausea. Feels completely normal, just seeing the blood in her urine daily. Patient is on Xarelto due to a history of PE. Denies changes with her bowels, appetite. On Evoxac for Sjogren's syndrome.    Review of Systems   Constitutional, HEENT, cardiovascular, pulmonary, gi and gu systems are negative, except as otherwise noted.      Objective    /80 (BP Location: Right arm, Patient Position: Sitting, Cuff Size: Adult Regular)   Pulse 68   Temp 97.9  F (36.6  C) (Tympanic)   Ht 1.613 m (5' 3.5\")   Wt 109.7 kg (241 lb 12.8 oz)   SpO2 95%   BMI 42.16 kg/m    Body mass index is 42.16 kg/m .  Physical Exam   GENERAL: healthy, " alert and no distress  ABDOMEN: soft, nontender, no hepatosplenomegaly  MS: no gross musculoskeletal defects noted, no edema  SKIN: no suspicious lesions or rashes  BACK: no CVA tenderness, no paralumbar tenderness    Results for orders placed or performed in visit on 06/06/22 (from the past 24 hour(s))   UA with Microscopic   Result Value Ref Range    Color Urine Brown (A) Colorless, Straw, Light Yellow, Yellow    Appearance Urine Cloudy (A) Clear    Glucose Urine Negative Negative mg/dL    Bilirubin Urine Negative Negative    Ketones Urine 15  (A) Negative mg/dL    Specific Gravity Urine >=1.030 1.003 - 1.035    Blood Urine Large (A) Negative    pH Urine 5.5 5.0 - 7.0    Protein Albumin Urine 100  (A) Negative mg/dL    Urobilinogen Urine 0.2 0.2, 1.0 E.U./dL    Nitrite Urine Negative Negative    Leukocyte Esterase Urine Trace (A) Negative   Urine Microscopic Exam   Result Value Ref Range    Bacteria Urine Few (A) None Seen /HPF    RBC Urine >100 (A) 0-2 /HPF /HPF    WBC Urine 0-5 0-5 /HPF /HPF    Squamous Epithelials Urine Few (A) None Seen /LPF

## 2022-06-08 LAB — BACTERIA UR CULT: NORMAL

## 2022-06-09 ENCOUNTER — OFFICE VISIT (OUTPATIENT)
Dept: OTOLARYNGOLOGY | Facility: CLINIC | Age: 47
End: 2022-06-09
Payer: COMMERCIAL

## 2022-06-09 VITALS — RESPIRATION RATE: 18 BRPM | HEART RATE: 79 BPM | OXYGEN SATURATION: 96 %

## 2022-06-09 DIAGNOSIS — K11.6 PAROTID CYST: Primary | ICD-10-CM

## 2022-06-09 PROCEDURE — 99212 OFFICE O/P EST SF 10 MIN: CPT | Mod: 25 | Performed by: OTOLARYNGOLOGY

## 2022-06-09 PROCEDURE — 10160 PNXR ASPIR ABSC HMTMA BULLA: CPT | Performed by: OTOLARYNGOLOGY

## 2022-06-09 NOTE — LETTER
6/9/2022         RE: Jesica Mooney  [address], [place] [address]        Dear Colleague,    Thank you for referring your patient, Jesica Mooney, to the [facility]. Please see a copy of my visit note below.    Chief Complaint - left parotid cyst    History of Present Illness - Jesica Mooney is a 46 year old female who returns with a left parotid cyst. It has been present since 11/2020. She had one episode of painful parotid or cheek swelling about 10 years ago too. She has a h/o Sjogren's, diagnosis 6/2016, but she has had symptoms prior to that. CT neck 12/10/2020 images reviewed showing a left parotid cystic mass measuring approximately 2.7 x 2.4 x 2.5 cm. No lymphadenopathy. I performed a core needle biopsy on this (12/2020). It was negative and she had a lot of fluid that poured out at the time of biopsy.  I carefully reviewed her CT neck and her ultrasound images.  This information in combination with a very thin copious fluid collection on core needle biopsy leads me to believe that this is a simple cyst or a lymphoepithelial cyst.  She has Sjogren's and the anatomy of her parotid gland on the CT suggests Sjogren's with a cyst.  I see no adjacent nodule or mass suggesting malignancy or other tumor. The cyst enlarges intermittent, and I have had to drain this about every 6 months. I drained this 7/2021 and 12/2021    She returns and notes slowly worsening left parotid swelling at her cyst site. No infection or pain. No redness. No numbness. No other lumps or bumps. no facial weakness. She would like the cyst aspirated again.     Past Medical History -   Patient Active Problem List   Diagnosis     BMI 40.0-44.9, adult (H)     CARDIOVASCULAR SCREENING; LDL GOAL LESS THAN 160     Other acute pulmonary embolism     Sjogren's syndrome (H)     Screening for cervical cancer       Current Medications -   Current Outpatient Medications:      cevimeline (EVOXAC) 30 MG  capsule, Take 1 capsule (30 mg) by mouth daily, Disp: 90 capsule, Rfl: 3     levonorgestrel (MIRENA) 20 MCG/DAY IUD, 1 each by Intrauterine route once, Disp: , Rfl:      rivaroxaban ANTICOAGULANT (XARELTO ANTICOAGULANT) 20 MG TABS tablet, TAKE 1 TABLET(20 MG) BY MOUTH DAILY WITH DINNER, Disp: 30 tablet, Rfl: 11    Allergies -   Allergies   Allergen Reactions     Keflex [Cephalexin Hcl] Hives     Penicillins Hives     Betadine [Povidone Iodine] Itching     Possibly itchiness, no hives, uncertain allergic reaction.       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   Lifestyle     Physical activity     Days per week: Not on file     Minutes per session: Not on file     Stress: Not on file   Relationships     Social connections     Talks on phone: Not on file     Gets together: Not on file     Attends Shinto service: Not on file     Active member of club or organization: Not on file     Attends meetings of clubs or organizations: Not on file     Relationship status: Not on file     Intimate partner violence     Fear of current or ex partner: Not on file     Emotionally abused: Not on file     Physically abused: Not on file     Forced sexual  activity: Not on file   Other Topics Concern     Parent/sibling w/ CABG, MI or angioplasty before 65F 55M? No      Service Yes     Blood Transfusions No     Caffeine Concern No     Occupational Exposure No     Hobby Hazards No     Sleep Concern No     Stress Concern No     Weight Concern No     Special Diet No     Back Care No     Exercise Yes     Bike Helmet No     Seat Belt Yes     Self-Exams Yes   Social History Narrative     Not on file       Family History -   Family History   Problem Relation Age of Onset     Eye Disorder Mother         cataracts      Rheumatoid Arthritis Mother      Cancer - colorectal Paternal Grandmother         ?     Thyroid Disease Father      Breast Cancer Maternal Aunt         diagnosed in 30s (mother's 1/2 sister)     Cancer No family hx of      Diabetes No family hx of      Hypertension No family hx of      Cerebrovascular Disease No family hx of      Macular Degeneration No family hx of      Glaucoma No family hx of        Physical Exam  General - The patient is in no distress.  Alert and oriented x3, answers questions and cooperates with examination appropriately.   Voice and Breathing - The patient was breathing comfortably without the use of accessory muscles. There was no wheezing, stridor, or stertor.  The patients voice was clear and strong.  Head and Face - Normocephalic and atraumatic.    Neck - The left parotid gland has a 2 cm cyst, somewhat firm of the superficial gland just anterior to the tragus and deep to the lobule. No tenderness. The rest of the occipital, submental, submandibular, internal jugular chain, and supraclavicular nodes did not demonstrate any abnormal lymph nodes or masses.     PROCEDURE - I explained the risks of needle aspiration including infection, bleeding, recurrence, the need for future procedures including removal. She agreed and wished to proceed. I cleansed the skin with alcohol (she has a betadine allergey). I injected 1% lidocaine,  1:100,000 epinephrine (1 ml) into the soft tissue just anterior to the left ear lobule. I then used a 16 gauge needle and aspirated 7 ml of turbid cyst fluid. Minimal bleeding. Bacitracin and a bandaid was placed.     A/P - Jesicaaustin Mooney is a 46 year old female with a left parotid cyst. She has Sjogren's disease. I took a core needle biopsy 12/2020 and drained cyst fluid. I then drained it again 7/2021, 12/2021, and today. She was doing well until recently she had worsening swelling at the site. It has filled to about 2 cm. I drained this again today at her request (7 ml). Recheck in 6 months, sooner if this gets infected, enlarges faster, other lumps or masses appear.     Cirilo Hernandez MD  Otolaryngology  [facility]        Again, thank you for allowing me to participate in the care of your patient.        Sincerely,        Cirilo Hernandez MD

## 2022-06-09 NOTE — PROGRESS NOTES
Chief Complaint - left parotid cyst    History of Present Illness - Jesicaaustin Mooney is a 46 year old female who returns with a left parotid cyst. It has been present since 11/2020. She had one episode of painful parotid or cheek swelling about 10 years ago too. She has a h/o Sjogren's, diagnosis 6/2016, but she has had symptoms prior to that. CT neck 12/10/2020 images reviewed showing a left parotid cystic mass measuring approximately 2.7 x 2.4 x 2.5 cm. No lymphadenopathy. I performed a core needle biopsy on this (12/2020). It was negative and she had a lot of fluid that poured out at the time of biopsy.  I carefully reviewed her CT neck and her ultrasound images.  This information in combination with a very thin copious fluid collection on core needle biopsy leads me to believe that this is a simple cyst or a lymphoepithelial cyst.  She has Sjogren's and the anatomy of her parotid gland on the CT suggests Sjogren's with a cyst.  I see no adjacent nodule or mass suggesting malignancy or other tumor. The cyst enlarges intermittent, and I have had to drain this about every 6 months. I drained this 7/2021 and 12/2021    She returns and notes slowly worsening left parotid swelling at her cyst site. No infection or pain. No redness. No numbness. No other lumps or bumps. no facial weakness. She would like the cyst aspirated again.     Past Medical History -   Patient Active Problem List   Diagnosis     BMI 40.0-44.9, adult (H)     CARDIOVASCULAR SCREENING; LDL GOAL LESS THAN 160     Other acute pulmonary embolism     Sjogren's syndrome (H)     Screening for cervical cancer       Current Medications -   Current Outpatient Medications:      cevimeline (EVOXAC) 30 MG capsule, Take 1 capsule (30 mg) by mouth daily, Disp: 90 capsule, Rfl: 3     levonorgestrel (MIRENA) 20 MCG/DAY IUD, 1 each by Intrauterine route once, Disp: , Rfl:      rivaroxaban ANTICOAGULANT (XARELTO ANTICOAGULANT) 20 MG TABS tablet, TAKE 1 TABLET(20  MG) BY MOUTH DAILY WITH DINNER, Disp: 30 tablet, Rfl: 11    Allergies -   Allergies   Allergen Reactions     Keflex [Cephalexin Hcl] Hives     Penicillins Hives     Betadine [Povidone Iodine] Itching     Possibly itchiness, no hives, uncertain allergic reaction.       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   Lifestyle     Physical activity     Days per week: Not on file     Minutes per session: Not on file     Stress: Not on file   Relationships     Social connections     Talks on phone: Not on file     Gets together: Not on file     Attends [employer] service: Not on file     Active member of club or organization: Not on file     Attends meetings of clubs or organizations: Not on file     Relationship status: Not on file     Intimate partner violence     Fear of current or ex partner: Not on file     Emotionally abused: Not on file     Physically abused: Not on file     Forced sexual activity: Not on file   Other Topics Concern     Parent/sibling w/ CABG, MI or angioplasty before 65F 55M? No      Service Yes     Blood Transfusions No     Caffeine Concern No     Occupational Exposure No     Hobby Hazards No     Sleep Concern No      Stress Concern No     Weight Concern No     Special Diet No     Back Care No     Exercise Yes     Bike Helmet No     Seat Belt Yes     Self-Exams Yes   Social History Narrative     Not on file       Family History -   Family History   Problem Relation Age of Onset     Eye Disorder Mother         cataracts      Rheumatoid Arthritis Mother      Cancer - colorectal Paternal Grandmother         ?     Thyroid Disease Father      Breast Cancer Maternal Aunt         diagnosed in 30s (mother's 1/2 sister)     Cancer No family hx of      Diabetes No family hx of      Hypertension No family hx of      Cerebrovascular Disease No family hx of      Macular Degeneration No family hx of      Glaucoma No family hx of        Physical Exam  General - The patient is in no distress.  Alert and oriented x3, answers questions and cooperates with examination appropriately.   Voice and Breathing - The patient was breathing comfortably without the use of accessory muscles. There was no wheezing, stridor, or stertor.  The patients voice was clear and strong.  Head and Face - Normocephalic and atraumatic.    Neck - The left parotid gland has a 2 cm cyst, somewhat firm of the superficial gland just anterior to the tragus and deep to the lobule. No tenderness. The rest of the occipital, submental, submandibular, internal jugular chain, and supraclavicular nodes did not demonstrate any abnormal lymph nodes or masses.     PROCEDURE - I explained the risks of needle aspiration including infection, bleeding, recurrence, the need for future procedures including removal. She agreed and wished to proceed. I cleansed the skin with alcohol (she has a betadine allergey). I injected 1% lidocaine, 1:100,000 epinephrine (1 ml) into the soft tissue just anterior to the left ear lobule. I then used a 16 gauge needle and aspirated 7 ml of turbid cyst fluid. Minimal bleeding. Bacitracin and a bandaid was placed.     A/P - Jesica Mooney is a 46 year old  female with a left parotid cyst. She has Sjogren's disease. I took a core needle biopsy 12/2020 and drained cyst fluid. I then drained it again 7/2021, 12/2021, and today. She was doing well until recently she had worsening swelling at the site. It has filled to about 2 cm. I drained this again today at her request (7 ml). Recheck in 6 months, sooner if this gets infected, enlarges faster, other lumps or masses appear.     Cirilo Hernandez MD  Otolaryngology  [facility]

## 2022-06-13 ENCOUNTER — PRE VISIT (OUTPATIENT)
Dept: UROLOGY | Facility: CLINIC | Age: 47
End: 2022-06-13
Payer: COMMERCIAL

## 2022-06-13 NOTE — TELEPHONE ENCOUNTER
MEDICAL RECORDS REQUEST   [facility]  Urology Clinic  [address] [place] [address]  PHONE: [phone]  Fax: [phone]        FUTURE VISIT INFORMATION                                                   Jesica Mooney, : 1975 scheduled for future visit at [facility]    APPOINTMENT INFORMATION:    Date: 2022    Provider:  Lorna Santamaria PA    Reason for Visit/Diagnosis: Gross hematuria    REFERRAL INFORMATION:    Referring provider:  Brenda Graves APRN CNP    Referring providers clinic:  [facility]    RECORDS REQUESTED FOR VISIT                                                     NOTES  STATUS/DETAILS   OFFICE NOTE from referring provider   Yes, 2022 -- Brenda Graves APRN CNP -- [facility]   MEDICATION LIST  yes   LABS     URINALYSIS (UA)  yes     PRE-VISIT CHECKLIST      Record collection complete yes   Appointment appropriately scheduled           (right time/right provider) Yes   Joint diagnostic appointment coordinated correctly          (ensure right order & amount of time) Yes   MyChart activation Yes   Questionnaire complete If no, please explain PENDING

## 2022-06-20 ENCOUNTER — PRE VISIT (OUTPATIENT)
Dept: UROLOGY | Facility: CLINIC | Age: 47
End: 2022-06-20
Payer: COMMERCIAL

## 2022-06-20 NOTE — CONFIDENTIAL NOTE
Reason for visit: consult     Relevant information: gross hematuria    Records/imaging/labs/orders: in epic    Pt called: n/a    At Rooming: bolivar Carmona  6/20/2022  8:04 AM

## 2022-06-21 ENCOUNTER — ANCILLARY PROCEDURE (OUTPATIENT)
Dept: CT IMAGING | Facility: CLINIC | Age: 47
End: 2022-06-21
Attending: NURSE PRACTITIONER
Payer: COMMERCIAL

## 2022-06-21 ENCOUNTER — TELEPHONE (OUTPATIENT)
Dept: OBGYN | Facility: CLINIC | Age: 47
End: 2022-06-21

## 2022-06-21 DIAGNOSIS — R31.0 GROSS HEMATURIA: ICD-10-CM

## 2022-06-21 LAB — RADIOLOGIST FLAGS: ABNORMAL

## 2022-06-21 PROCEDURE — 74176 CT ABD & PELVIS W/O CONTRAST: CPT | Performed by: RADIOLOGY

## 2022-06-21 NOTE — TELEPHONE ENCOUNTER
Telephone call to patient and discussed CT scan results. Has appointment scheduled with Urology for follow up 6/23/22. Will keep appointment at this time. Discussed pain management as needed until then. Questions answered to my best ability regarding the CT findings.   Also discussed the IUD finding. She has not been having cycles and is currently not using it for contraception. Will follow up with GYN physician, but likely planning to deal with the Urology concern first. Questions answered. Brenda ROPER CNP

## 2022-06-21 NOTE — TELEPHONE ENCOUNTER
RN received call from imaging reporting on urgent finding on CT done today: 8 mm stone within the left proximal ureter without  upstream dilatation. Intrauterine device is within the lower uterine  body with one arm crosses the myometrium, extends adjacent to upper  Rectum.    RN routing to provider for advisement.    Britney Perrin RN on 6/21/2022 at 3:18 PM

## 2022-06-23 ENCOUNTER — OFFICE VISIT (OUTPATIENT)
Dept: OPTOMETRY | Facility: CLINIC | Age: 47
End: 2022-06-23
Payer: COMMERCIAL

## 2022-06-23 ENCOUNTER — VIRTUAL VISIT (OUTPATIENT)
Dept: UROLOGY | Facility: CLINIC | Age: 47
End: 2022-06-23
Payer: COMMERCIAL

## 2022-06-23 DIAGNOSIS — H52.223 REGULAR ASTIGMATISM OF BOTH EYES: ICD-10-CM

## 2022-06-23 DIAGNOSIS — M35.00 SJOGREN'S SYNDROME, WITH UNSPECIFIED ORGAN INVOLVEMENT (H): ICD-10-CM

## 2022-06-23 DIAGNOSIS — H52.13 MYOPIA OF BOTH EYES: ICD-10-CM

## 2022-06-23 DIAGNOSIS — N20.1 URETERAL STONE: Primary | ICD-10-CM

## 2022-06-23 DIAGNOSIS — Z01.00 ROUTINE EYE EXAM: Primary | ICD-10-CM

## 2022-06-23 PROCEDURE — 99203 OFFICE O/P NEW LOW 30 MIN: CPT | Mod: 95 | Performed by: PHYSICIAN ASSISTANT

## 2022-06-23 PROCEDURE — 92015 DETERMINE REFRACTIVE STATE: CPT | Performed by: OPTOMETRIST

## 2022-06-23 PROCEDURE — 92014 COMPRE OPH EXAM EST PT 1/>: CPT | Performed by: OPTOMETRIST

## 2022-06-23 PROCEDURE — 92310 CONTACT LENS FITTING OU: CPT | Mod: GA | Performed by: OPTOMETRIST

## 2022-06-23 RX ORDER — TAMSULOSIN HYDROCHLORIDE 0.4 MG/1
0.4 CAPSULE ORAL DAILY
Qty: 30 CAPSULE | Refills: 0 | Status: SHIPPED | OUTPATIENT
Start: 2022-06-23 | End: 2022-08-30

## 2022-06-23 ASSESSMENT — VISUAL ACUITY
OD_CC: 20/30
OD_CC: 20/50-1
OD_SC: 20/400
OS_CC+: -1
OS_CC: 20/30
OD_CC+: -1
METHOD: SNELLEN - LINEAR
OS_SC: 20/400
CORRECTION_TYPE: GLASSES
OS_CC: 20/50

## 2022-06-23 ASSESSMENT — TONOMETRY
OS_IOP_MMHG: 16
IOP_METHOD: APPLANATION
OD_IOP_MMHG: 16

## 2022-06-23 ASSESSMENT — REFRACTION_WEARINGRX
SPECS_TYPE: SVL DISTANCE
OS_AXIS: 085
OS_CYLINDER: +1.50
OD_AXIS: 060
OD_SPHERE: -10.00
OS_SPHERE: -9.75
OD_CYLINDER: +1.25

## 2022-06-23 ASSESSMENT — REFRACTION_MANIFEST
OD_SPHERE: -9.75
OS_SPHERE: -8.50
OS_CYLINDER: -1.25
OS_ADD: +1.50
OD_CYLINDER: -1.25
OD_AXIS: 150
OS_AXIS: 170
OS_CYLINDER: +1.75
OS_SPHERE: -9.75
OD_SPHERE: -9.00
METHOD_AUTOREFRACTION: 1
OS_AXIS: 075
OD_ADD: +1.50
OD_AXIS: 073
OD_CYLINDER: +1.25

## 2022-06-23 ASSESSMENT — CUP TO DISC RATIO
OD_RATIO: 0.2
OS_RATIO: 0.2

## 2022-06-23 ASSESSMENT — REFRACTION_CURRENTRX
OS_AXIS: 180
OD_AXIS: 150
OD_SPHERE: -8.50
OS_BRAND: COOPER BIOFINITY TORIC BC 8.7, D 14.5
OS_SPHERE: -8.00
OD_BRAND: COOPER BIOFINITY TORIC BC 8.7, D 14.5
OS_CYLINDER: -0.75
OD_CYLINDER: -0.75

## 2022-06-23 ASSESSMENT — KERATOMETRY
OD_K2POWER_DIOPTERS: 46.00
OD_AXISANGLE2_DEGREES: 159
OD_K1POWER_DIOPTERS: 44.75
OS_K1POWER_DIOPTERS: 45.00
OS_AXISANGLE2_DEGREES: 169
OS_K2POWER_DIOPTERS: 46.50

## 2022-06-23 ASSESSMENT — SLIT LAMP EXAM - LIDS
COMMENTS: NORMAL
COMMENTS: NORMAL

## 2022-06-23 ASSESSMENT — EXTERNAL EXAM - RIGHT EYE: OD_EXAM: NORMAL

## 2022-06-23 ASSESSMENT — CONF VISUAL FIELD
METHOD: COUNTING FINGERS
OD_NORMAL: 1
OS_NORMAL: 1

## 2022-06-23 ASSESSMENT — EXTERNAL EXAM - LEFT EYE: OS_EXAM: NORMAL

## 2022-06-23 NOTE — Clinical Note
Order 2 pair of trials for I & R appointment, patient wants Text or my chart message when contact lenses arrive to set up I & R

## 2022-06-23 NOTE — LETTER
6/23/2022         RE: Jesica Mooney  [address] [address]        Dear Colleague,    Thank you for referring your patient, [facility]ica Mooney[facility] Please see a copy of my visit note below.    Chief Complaint   Patient presents with     COMPREHENSIVE EYE EXAM     New Eval For Contact Lens     Wore RGP lenses in the past before her eyes got so dry , wants to wear them part time for social events , not work       Accompanied by daughter    Last Eye Exam: 12/01/20  Dilated Previously: Yes    What are you currently using to see?  Glasses. Patient would like to discuss her options, if any for soft contacts for dry eyes. Wants to wear contact lenses for social reasons, not for work      Tried bifocal returned        Distance Vision Acuity: Satisfied with vision, not aware of changes     Near Vision Acuity: Has noticed some changes to her near vision     Eye Comfort: dry but no worse  Do you use eye drops? : Yes: Uses Systane Balance and Hot Compresses   Occupation or Hobbies: Orthopedic Scheduling Lead for [employer]     [employer] Optometric Assistant           Medical, surgical and family histories reviewed and updated 6/23/2022.       OBJECTIVE: See Ophthalmology exam    ASSESSMENT:    ICD-10-CM    1. Routine eye exam  Z01.00 EYE EXAM (SIMPLE-NONBILLABLE)     REFRACTION     CONTACT LENS FITTING,BILAT (NEW) w/ signed waiver   2. Myopia of both eyes  H52.13 EYE EXAM (SIMPLE-NONBILLABLE)     REFRACTION     CONTACT LENS FITTING,BILAT (NEW) w/ signed waiver   3. Regular astigmatism of both eyes  H52.223 EYE EXAM (SIMPLE-NONBILLABLE)     REFRACTION     CONTACT LENS FITTING,BILAT (NEW) w/ signed waiver   4. Sjogren's syndrome, with unspecified organ involvement (H)  M35.00 EYE EXAM (SIMPLE-NONBILLABLE)     REFRACTION     CONTACT LENS FITTING,BILAT (NEW) w/ signed waiver       PLAN:     Patient Instructions   Fill glasses prescription , consider computer  work glasses   Continue Systane Balance and hot compresses   Discussed contact lenses options part time use of monthly lens 1-2 times a week for a few hours   Return to clinic for I & R     Minnie Akers, OD  [phone]                              Again, thank you for allowing me to participate in the care of your patient.        Sincerely,        Minnie Akers, OD

## 2022-06-23 NOTE — PROGRESS NOTES
Chief Complaint:   Gross hematuria, renal stone         History of Present Illness:   Jesica Mooney is a 46 year old female with a history of PE on chronic anticoagulation with Xarelto, who presents for evaluation of gross hematuria and renal stone.  Patient reports symptoms of gross hematuria which has been ongoing for the past 2-3 months since 4/2022.  She denies any vaginal bleeding and has a Mirena IUD in place.  She denies any associated flank pain or dysuria, and no urinary frequency or urgency.  Patient reports the hematuria will be intermittent, and is usually present first thing in the morning, and will then be present intermittently throughout the day.  She does report that the more she stays hydrated, the less noticeable it is.  Patient was seen by her OBGYN on 6/6/2022 with urinalysis showing >100 RBC, with trace LE and few bacteria, and -nitrite or WBC; urine culture showing 10-50k CFU/mL mixture urogenital jennifer.  Renal function from that date normal with creatinine 0.81.  Patient was subsequently referred for a CT abdomen pelvis wo contrast from 6/21/2022 which revealed a 8 x 6 x 7 mm stone at the left ureteropelvic junction/proximal ureter without upstream dilatation; nonobstructive tiny right renal stones also noted.  Patient continues to deny any symptoms with the exception of continued gross hematuria, including no dysuria, flank pain, fevers, chills, nausea or vomiting.           Past Medical History:     Past Medical History:   Diagnosis Date     IUD (intrauterine device) in place     Mirena inserted 12/28/15            Past Surgical History:     Past Surgical History:   Procedure Laterality Date     BIOPSY  a fews years ago    A mole was removed from by scalp     [facility] ORAL SURGERY PROCEDURE  10/1990    wisdom teeth extracted            Medications     Current Outpatient Medications   Medication     cevimeline (EVOXAC) 30 MG capsule     levonorgestrel (MIRENA) 20 MCG/DAY IUD      rivaroxaban ANTICOAGULANT (XARELTO ANTICOAGULANT) 20 MG TABS tablet     No current facility-administered medications for this visit.            Family History:     Family History   Problem Relation Age of Onset     Eye Disorder Mother         cataracts      Rheumatoid Arthritis Mother      Cancer - colorectal Paternal Grandmother         ?     Thyroid Disease Father      Breast Cancer Maternal Aunt         diagnosed in 30s (mother's 1/2 sister)     Cancer No family hx of      Diabetes No family hx of      Hypertension No family hx of      Cerebrovascular Disease No family hx of      Macular Degeneration No family hx of      Glaucoma No family hx of             Social History:     Social History     Socioeconomic History     Marital status:      Spouse name: Not on file     Number of children: Not on file     Years of education: Not on file     Highest education level: Not on file   Occupational History     Employer: [employer]     Comment: Scheduling   Tobacco Use     Smoking status: Never Smoker     Smokeless tobacco: Never Used     Tobacco comment: Lives in smoke free household   Substance and Sexual Activity     Alcohol use: No     Alcohol/week: 0.0 standard drinks     Drug use: No     Sexual activity: Yes     Partners: Male     Birth control/protection: I.U.D.   Other Topics Concern     Parent/sibling w/ CABG, MI or angioplasty before 65F 55M? No      Service Yes     Blood Transfusions No     Caffeine Concern No     Occupational Exposure No     Hobby Hazards No     Sleep Concern No     Stress Concern No     Weight Concern No     Special Diet No     Back Care No     Exercise Yes     Bike Helmet No     Seat Belt Yes     Self-Exams Yes   Social History Narrative     Not on file     Social Determinants of Health     Financial Resource Strain: Not on file   Food Insecurity: Not on file   Transportation Needs: Not on file   Physical Activity: Not on file   Stress: Not on file   Social Connections: Not on  file   Intimate Partner Violence: Not on file   Housing Stability: Not on file            Allergies:   Keflex [cephalexin hcl], Penicillins, and Betadine [povidone iodine]         Review of Systems:  From intake questionnaire   Negative 14 system review except as noted on HPI, nurse's note.         Physical Exam:   Patient is a 46 year old  female    General Appearance Adult: Alert, no acute distress, oriented  Lungs: no respiratory distress, or pursed lip breathing  Heart: No obvious jugular venous distension present  Skin: no suspicious lesions or rashes  Neuro: Alert, oriented, speech and mentation normal  Further examination is deferred due to the nature of our visit.        Labs and Pathology:    I personally reviewed all applicable laboratory data and went over findings with patient  Significant for:    CBC RESULTS:  Recent Labs   Lab Test 06/06/22  0822 11/21/21  1016 11/23/20  0853 11/25/19  0837   WBC 6.9 7.0 7.6 7.0   HGB 14.9 15.3 15.0 15.7    227 254 248        BMP RESULTS:  Recent Labs   Lab Test 06/06/22  0822 11/21/21  1016 11/23/20  0853 11/25/19  0837 11/26/18  0830 11/27/17  0846    138 138 137 138 137   POTASSIUM 4.1 4.0 4.0 4.2 4.1 4.0   CHLORIDE 107 107 106 108 106 105   CO2 29 27 29 28 27 29   ANIONGAP 3 4 3 1* 5 3   * 87 89 98 96 88   BUN 12 12 14 13 14 14   CR 0.81 0.73 0.84 0.78 0.81 0.90   GFRESTIMATED 90 >90 83 >90 77 68   GFRESTBLACK  --   --  >90 >90 >90 82   BROOKS 9.3 9.0 9.0 9.6 9.3 9.2       UA RESULTS:   Recent Labs   Lab Test 06/06/22  0746 11/21/21  1016 11/23/20  0906   SG >=1.030 1.015 1.015   URINEPH 5.5 7.0 6.0   NITRITE Negative Negative Negative   RBCU >100* 0-2 O - 2   WBCU 0-5 0-5 0 - 5         Imaging:    I personally reviewed all applicable imaging and went over findings with patient.  Significant for:    Results for orders placed or performed in visit on 06/21/22   CT Abdomen Pelvis w/o Contrast     Value    Radiologist flags 8 mm stone within the left  proximal ureter without (Urgent)    Narrative    EXAMINATION: CT ABDOMEN PELVIS W/O CONTRAST, 6/21/2022 10:36 AM    TECHNIQUE:  Helical CT images from the lung bases through the pubic  symphysis were obtained  without IV contrast.     COMPARISON: None.    HISTORY: Hematuria, unknown cause; Gross hematuria    FINDINGS:    8 x 6 x 7 mm stone within the left proximal ureter (series 3 image  177) without upstream dilatation. Nonobstructive tiny calyceal stone  in the lower pole of the right kidney. Cortical thinning in the lower  pole the right kidney. No appreciable renal mass. No  hydroureteronephrosis. Nondistended bladder is unremarkable.    Intrauterine device is within the lower uterine body with one arm  crosses the myometrium (series 5 image 68), extends adjacent to upper  rectum. Subcentimeter calcification within the uterine fundus, likely  represents calcified fibroid. Left ovarian follicle.    Liver, spleen, pancreas and bilateral adrenal glands are unremarkable.  No dilated bowel. Appendix is normal. No osseous lesion. Partially  visualized lungs are clear.      Impression    IMPRESSION:   1. 8 x 6 x 7 mm stone at the left ureteropelvic junction/proximal  ureter without upstream dilatation.   2. Nonobstructive tiny right renal stones.   3. Intrauterine device is within the lower uterine segment with one  arm extending posteriorly through the myometrium adjacent to upper  rectum.    [Access Center: 8 mm stone within the left proximal ureter without  upstream dilatation. Intrauterine device is within the lower uterine  body with one arm crosses the myometrium, extends adjacent to upper  rectum.  ]    This report will be copied to the [facility] to ensure a  provider acknowledges the finding. Access Center is available Monday  through Friday 8am-3:30 pm.        I have personally reviewed the examination and initial interpretation  and I agree with the findings.    MARIIA CHEN, DO         SYSTEM  ID:  [id number]              Assessment and Plan:     Assessment: 46 year old female with painless gross hematuria which has been ongoing since 4/2022.  Recent CT abdomen pelvis wo contrast from 6/21/2022 showing an 8 x 6 x 7 mm stone at the left ureteropelvic junction/proximal  ureter without upstream dilatation.  Recent urine culture without signs of infection and likely contamination, and renal function normal from 6/6/2022.  Patient asymptomatic at this time.  Suspect gross hematuria secondary to ureteral stone, and suspect stone has likely be present since 4/2022 when her hematuria started.  We will plan to start Flomax 0.4 mg once daily and patient advised to strain her urine to monitor for stone passage, though lower suspicion that she will be able to pass the stone on her own given size.  Reviewed with Dr. Vera and patient will likely require surgical intervention for the ureteral stone.  Will set the patient up for a virtual visit to discuss surgical options and proceed forward.      Reviewed symptoms for which the patient should present more emergently to the ER including dysuria, decreased urinary output, flank pain, fevers, or chills.  Patient in agreement with plan.     Plan:  - Schedule virtual visit with Dr. Vera to discuss ureteral stone removal.   - Start Flomax 0.4 mg once daily.       JOANN Newell  Department of Urology

## 2022-06-23 NOTE — PATIENT INSTRUCTIONS
UROLOGY CLINIC VISIT PATIENT INSTRUCTIONS    - Schedule virtual visit with Dr. Vera to discuss ureteral stone removal.   - Start Flomax 0.4 mg once daily.     If you have any issues, questions or concerns in the meantime, do not hesitate to contact us at [phone] or via [employer].     It was a pleasure meeting with you today.  Thank you for allowing me and my team the privilege of caring for you today.  YOU are the reason we are here, and I truly hope we provided you with the excellent service you deserve.  Please let us know if there is anything else we can do for you so that we can be sure you are leaving completely satisfied with your care experience.    Lorna Santamaria PA-C  Department of Urology

## 2022-06-23 NOTE — LETTER
6/23/2022       RE: Jesica Mooney  [address] [address]     Dear Colleague,    Thank you for referring your patient, Jesica Mooney, to the [facility] [place] [facility]. Please see a copy of my visit note below.    Gretchen is a 46 year old who is being evaluated via a billable video visit.      How would you like to obtain your AVS? MyChart  If the video visit is dropped, the invitation should be resent by: Text to cell phone: [phone]  Will anyone else be joining your video visit? No        Video-Visit Details    Video Start Time: 11:01 AM    Type of service:  Video Visit    Video End Time:11:25 AM    Originating Location (pt. Location): Home    Distant Location (provider location):  [facility]     Platform used for Video Visit: [employer]          Chief Complaint:   Gross hematuria, renal stone         History of Present Illness:   Jesica Mooney is a 46 year old female with a history of PE on chronic anticoagulation with Xarelto, who presents for evaluation of gross hematuria and renal stone.  Patient reports symptoms of gross hematuria which has been ongoing for the past 2-3 months since 4/2022.  She denies any vaginal bleeding and has a Mirena IUD in place.  She denies any associated flank pain or dysuria, and no urinary frequency or urgency.  Patient reports the hematuria will be intermittent, and is usually present first thing in the morning, and will then be present intermittently throughout the day.  She does report that the more she stays hydrated, the less noticeable it is.  Patient was seen by her OBGYN on 6/6/2022 with urinalysis showing >100 RBC, with trace LE and few bacteria, and -nitrite or WBC; urine culture showing 10-50k CFU/mL mixture urogenital jennifer.  Renal function from that date normal with creatinine 0.81.  Patient was subsequently referred for a CT  abdomen pelvis wo contrast from 6/21/2022 which revealed a 8 x 6 x 7 mm stone at the left ureteropelvic junction/proximal ureter without upstream dilatation; nonobstructive tiny right renal stones also noted.  Patient continues to deny any symptoms with the exception of continued gross hematuria, including no dysuria, flank pain, fevers, chills, nausea or vomiting.           Past Medical History:     Past Medical History:   Diagnosis Date     IUD (intrauterine device) in place     Mirena inserted 12/28/15            Past Surgical History:     Past Surgical History:   Procedure Laterality Date     BIOPSY  a fews years ago    A mole was removed from by scalp     [facility] ORAL SURGERY PROCEDURE  10/1990    wisdom teeth extracted            Medications     Current Outpatient Medications   Medication     cevimeline (EVOXAC) 30 MG capsule     levonorgestrel (MIRENA) 20 MCG/DAY IUD     rivaroxaban ANTICOAGULANT (XARELTO ANTICOAGULANT) 20 MG TABS tablet     No current facility-administered medications for this visit.            Family History:     Family History   Problem Relation Age of Onset     Eye Disorder Mother         cataracts      Rheumatoid Arthritis Mother      Cancer - colorectal Paternal Grandmother         ?     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   Other Topics Concern     Parent/sibling w/ CABG, MI or angioplasty before 65F 55M? No      Service Yes     Blood Transfusions No     Caffeine Concern No     Occupational Exposure No     Hobby Hazards No     Sleep Concern No     Stress Concern No     Weight Concern No     Special Diet No     Back Care No     Exercise Yes     Bike Helmet No     Seat Belt Yes     Self-Exams Yes   Social History Narrative     Not on file     Social Determinants of Health     Financial Resource Strain: Not on file   Food Insecurity: Not on file   Transportation Needs: Not on file   Physical Activity: Not on file   Stress: Not on file   Social Connections: Not on file   Intimate Partner Violence: Not on file   Housing Stability: Not on file            Allergies:   Keflex [cephalexin hcl], Penicillins, and Betadine [povidone iodine]         Review of Systems:  From intake questionnaire   Negative 14 system review except as noted on HPI, nurse's note.         Physical Exam:   Patient is a 46 year old  female    General Appearance Adult: Alert, no acute distress, oriented  Lungs: no respiratory distress, or pursed lip breathing  Heart: No obvious jugular venous distension present  Skin: no suspicious lesions or rashes  Neuro: Alert, oriented, speech and mentation normal  Further examination is deferred due to the nature of our visit.        Labs and Pathology:    I personally reviewed all applicable laboratory data and went over findings with patient  Significant for:    CBC RESULTS:  Recent Labs   Lab Test 06/06/22  0822 11/21/21  1016 11/23/20  0853 11/25/19  0837   WBC 6.9 7.0 7.6 7.0   HGB 14.9 15.3 15.0 15.7    227 254 248        BMP RESULTS:  Recent Labs   Lab Test 06/06/22  0822 11/21/21  1016  11/23/20  0853 11/25/19  0837 11/26/18  0830 11/27/17  0846    138 138 137 138 137   POTASSIUM 4.1 4.0 4.0 4.2 4.1 4.0   CHLORIDE 107 107 106 108 106 105   CO2 29 27 29 28 27 29   ANIONGAP 3 4 3 1* 5 3   * 87 89 98 96 88   BUN 12 12 14 13 14 14   CR 0.81 0.73 0.84 0.78 0.81 0.90   GFRESTIMATED 90 >90 83 >90 77 68   GFRESTBLACK  --   --  >90 >90 >90 82   BROOKS 9.3 9.0 9.0 9.6 9.3 9.2       UA RESULTS:   Recent Labs   Lab Test 06/06/22  0746 11/21/21  1016 11/23/20  0906   SG >=1.030 1.015 1.015   URINEPH 5.5 7.0 6.0   NITRITE Negative Negative Negative   RBCU >100* 0-2 O - 2   WBCU 0-5 0-5 0 - 5         Imaging:    I personally reviewed all applicable imaging and went over findings with patient.  Significant for:    Results for orders placed or performed in visit on 06/21/22   CT Abdomen Pelvis w/o Contrast     Value    Radiologist flags 8 mm stone within the left proximal ureter without (Urgent)    Narrative    EXAMINATION: CT ABDOMEN PELVIS W/O CONTRAST, 6/21/2022 10:36 AM    TECHNIQUE:  Helical CT images from the lung bases through the pubic  symphysis were obtained  without IV contrast.     COMPARISON: None.    HISTORY: Hematuria, unknown cause; Gross hematuria    FINDINGS:    8 x 6 x 7 mm stone within the left proximal ureter (series 3 image  177) without upstream dilatation. Nonobstructive tiny calyceal stone  in the lower pole of the right kidney. Cortical thinning in the lower  pole the right kidney. No appreciable renal mass. No  hydroureteronephrosis. Nondistended bladder is unremarkable.    Intrauterine device is within the lower uterine body with one arm  crosses the myometrium (series 5 image 68), extends adjacent to upper  rectum. Subcentimeter calcification within the uterine fundus, likely  represents calcified fibroid. Left ovarian follicle.    Liver, spleen, pancreas and bilateral adrenal glands are unremarkable.  No dilated bowel. Appendix is normal. No osseous lesion.  Partially  visualized lungs are clear.      Impression    IMPRESSION:   1. 8 x 6 x 7 mm stone at the left ureteropelvic junction/proximal  ureter without upstream dilatation.   2. Nonobstructive tiny right renal stones.   3. Intrauterine device is within the lower uterine segment with one  arm extending posteriorly through the myometrium adjacent to upper  rectum.    [Access Center: 8 mm stone within the left proximal ureter without  upstream dilatation. Intrauterine device is within the lower uterine  body with one arm crosses the myometrium, extends adjacent to upper  rectum.  ]    This report will be copied to the [facility] to ensure a  provider acknowledges the finding. Access Center is available Monday  through Friday 8am-3:30 pm.        I have personally reviewed the examination and initial interpretation  and I agree with the findings.    MARIIA CHEN DO         SYSTEM ID:  [id number]              Assessment and Plan:     Assessment: 46 year old female with painless gross hematuria which has been ongoing since 4/2022.  Recent CT abdomen pelvis wo contrast from 6/21/2022 showing an 8 x 6 x 7 mm stone at the left ureteropelvic junction/proximal  ureter without upstream dilatation.  Recent urine culture without signs of infection and likely contamination, and renal function normal from 6/6/2022.  Patient asymptomatic at this time.  Suspect gross hematuria secondary to ureteral stone, and suspect stone has likely be present since 4/2022 when her hematuria started.  We will plan to start Flomax 0.4 mg once daily and patient advised to strain her urine to monitor for stone passage, though lower suspicion that she will be able to pass the stone on her own given size.  Reviewed with Dr. Vera and patient will likely require surgical intervention for the ureteral stone.  Will set the patient up for a virtual visit to discuss surgical options and proceed forward.      Reviewed symptoms for which the  patient should present more emergently to the ER including dysuria, decreased urinary output, flank pain, fevers, or chills.  Patient in agreement with plan.     Plan:  - Schedule virtual visit with Dr. Vera to discuss ureteral stone removal.   - Start Flomax 0.4 mg once daily.       JOANN Newell  Department of Urology

## 2022-06-23 NOTE — PROGRESS NOTES
Gretchen is a 46 year old who is being evaluated via a billable video visit.      How would you like to obtain your AVS? [employer]  If the video visit is dropped, the invitation should be resent by: Text to cell phone: [phone]  Will anyone else be joining your video visit? No        Video-Visit Details    Video Start Time: 11:01 AM    Type of service:  Video Visit    Video End Time:11:25 AM    Originating Location (pt. Location): Home    Distant Location (provider location):  [facility]     Platform used for Video Visit: [employer]

## 2022-06-23 NOTE — PATIENT INSTRUCTIONS
Fill glasses prescription , consider computer work glasses   Continue Systane Balance and hot compresses   Discussed contact lenses options part time use of monthly lens 1-2 times a week for a few hours   Return to clinic for I & R     Minnie Akers, OD  [phone]

## 2022-06-24 ENCOUNTER — PATIENT OUTREACH (OUTPATIENT)
Dept: UROLOGY | Facility: CLINIC | Age: 47
End: 2022-06-24

## 2022-06-24 NOTE — TELEPHONE ENCOUNTER
RNCC and pt discuss surgery in detail, questions answered, she does not need visit with provider, she is ready to schedule. Pt prefers surgery dates not to be Jul 13, or 22-26 however she is willing to take next available date if needed. Pt states she will wait for  to proceed

## 2022-06-28 DIAGNOSIS — N20.1 OBSTRUCTION OF LEFT URETEROPELVIC JUNCTION DUE TO STONE: Primary | ICD-10-CM

## 2022-06-30 ENCOUNTER — TELEPHONE (OUTPATIENT)
Dept: UROLOGY | Facility: CLINIC | Age: 47
End: 2022-06-30

## 2022-06-30 ENCOUNTER — PATIENT OUTREACH (OUTPATIENT)
Dept: UROLOGY | Facility: CLINIC | Age: 47
End: 2022-06-30

## 2022-06-30 DIAGNOSIS — N20.1 URETERAL STONE: Primary | ICD-10-CM

## 2022-06-30 NOTE — TELEPHONE ENCOUNTER
Spoke with: sent all surgery infor through [facility] like pt requested       Date of surgery: Wednesday July 20th 2022 10:50am      Location: [facility]      Informed patient they will need a adult : YEs      Pre op with provider: JARED      H&P Scheduled in PAC - Monday July 11 th 8:30am         Pre procedure covid : Pt to do a home tests 1-2 days prior told to bring photo of Neg test to surgery      Additional imaging: NA         Surgery Packet : Forward to Farrah       Additional comments: Please reach out to patient for surgery teaching. Thank you

## 2022-06-30 NOTE — TELEPHONE ENCOUNTER
Pre Op Teaching Flowsheet       Pre and Post op Patient Education  Relevant Diagnosis: stone  Surgical procedure: laser litho  Teaching Topic:  Pre and post op teaching  Person Involved in teaching: Yes    Motivation Level:  Asks Questions: Yes  Eager to Learn: Yes  Cooperative: Yes  Receptive (willing/able to accept information):  Yes    Patient demonstrates understanding of the following:  Date of surgery:  7/20  Location of surgery:  [facility] - 5th Floor  History and Physical and any other testing necessary prior to surgery: Yes  Required time line for completion of History and Physical and any pre-op testing: Yes    Patient demonstrates understanding of the following:  Pre-op bowel prep:  N/A  Pre-op showering/scrub information with PCMX Soap: Yes  Blood thinner medications discussed and when to stop (if applicable):  Yes  Discussed no visitor's at this time due to increase Covid-19 cases and how we need to make sure everyone stays safe.    Infection Prevention:   Patient demonstrates understanding of the following:  Surgical procedure site care taught: Yes  Signs and symptoms of infection taught: Yes      Post-op follow-up:  Discussed how to contact the hospital, nurse, and clinic scheduling staff if necessary. (See packet information)    Instructional materials used/given/mailed:  [facility] Packet, post op teaching sheet, Map, Soap, and with the arrival/location information to come closer to the surgery date.    Surgical instructions packet given to patient in office:  N/A    Follow up: Discussed arranging for someone to drive you home. ( No public transportation)  Someone needed to stay the first twenty hours after surgery: Yes     referral: no     home:  yes    Care Giver:  yes    PCP:  yes

## 2022-07-08 NOTE — PHARMACY - PREOPERATIVE ASSESSMENT CENTER
Preoperative Assessment Center Medication History Note    Medication history completed on July 8, 2022 by this writer. See Epic admission navigator for prior to admission medications. Operating room staff will still need to confirm medications and last dose information on day of surgery.     Medication history interview sources  Patient interview: Yes  Care Everywhere records: Yes  Surescripts pharmacy refill records: Yes  Other (if applicable): None    Changes made to PTA medication list  Added:   -- Systane    Deleted: None    Changed: None    Additional medication history information (including reliability of information, actions taken by pharmacist):    -- No recent (within 30 days) course of antibiotics  -- No recent (within 30 days) course of systemic steroids  -- Reports  being on blood thinning medications xarelto   -- Declines being on any other prescription or over-the-counter medications  -- patient has not started tamsulosin yet    Prior to Admission medications    Medication Sig Last Dose Taking? Auth Provider Long Term End Date   cevimeline (EVOXAC) 30 MG capsule Take 1 capsule (30 mg) by mouth daily Taking Yes Dylan Donato MD     levonorgestrel (MIRENA) 20 MCG/DAY IUD 1 each by Intrauterine route once Taking Yes Reported, Patient No    polyethylene glycol-propylene glycol (SYSTANE ULTRA) 0.4-0.3 % SOLN ophthalmic solution Place 1 drop into both eyes 2 times daily as needed for dry eyes Taking Yes Unknown, Entered By History     rivaroxaban ANTICOAGULANT (XARELTO ANTICOAGULANT) 20 MG TABS tablet TAKE 1 TABLET(20 MG) BY MOUTH DAILY WITH DINNER Taking Yes Brittni Argueta PA-C Yes    tamsulosin (FLOMAX) 0.4 MG capsule Take 1 capsule (0.4 mg) by mouth daily  Patient not taking: Reported on 7/8/2022 Not Taking  Lorna Santamaria PA         Medication history completed by: Carlo Forde [facility]

## 2022-07-08 NOTE — PHARMACY - PREOPERATIVE ASSESSMENT CENTER
Anticoagulation Note - Preoperative Assessment Center (PAC) Pharmacist     Patient was interviewed on July 8, 2022 as a part of PAC clinic appointment. The purpose of this note is to document the perioperative anticoagulation plan outlined by the providers caring for Jesica Mooney.     Current Regimen  Anticoagulation Regimen as of July 8, 2022: Xarelto 20 mg every evening  Indication: history of Mild antithrombin III deficiency, with SERPINC gene mutation (VUS)   Prescriber:  Brittni Trejo  Expected Duration of therapy: indefinite    Creatinine   Date Value Ref Range Status   06/06/2022 0.81 0.52 - 1.04 mg/dL Final   11/23/2020 0.84 0.52 - 1.04 mg/dL Final       Perioperative plan  Jesica Mooney is scheduled for CYSTOURETEROSCOPY, WITH LITHOTRIPSY USING LASER AND URETERAL STENT INSERTION on 7/20/22 with Dr. Roper and the perioperative anticoagulation plan outlined by PAC in collaboration with surgeon is to hold xarelto 24 hours prior to surgery.     Resumption of anticoagulation after procedure will be based on surgery team assessment of bleeding risks and complications.  This plan may require re-assessment and modification by her primary team in the perioperative setting depending on patients clinical situation.        Carlo Forde RPH  July 8, 2022  12:22 PM

## 2022-07-11 ENCOUNTER — VIRTUAL VISIT (OUTPATIENT)
Dept: SURGERY | Facility: CLINIC | Age: 47
End: 2022-07-11
Payer: COMMERCIAL

## 2022-07-11 ENCOUNTER — PRE VISIT (OUTPATIENT)
Dept: SURGERY | Facility: CLINIC | Age: 47
End: 2022-07-11

## 2022-07-11 ENCOUNTER — ANESTHESIA EVENT (OUTPATIENT)
Dept: SURGERY | Facility: AMBULATORY SURGERY CENTER | Age: 47
End: 2022-07-11
Payer: COMMERCIAL

## 2022-07-11 DIAGNOSIS — N20.1 OBSTRUCTION OF LEFT URETEROPELVIC JUNCTION DUE TO STONE: ICD-10-CM

## 2022-07-11 DIAGNOSIS — Z01.818 PREOP EXAMINATION: Primary | ICD-10-CM

## 2022-07-11 PROCEDURE — 99203 OFFICE O/P NEW LOW 30 MIN: CPT | Mod: 95 | Performed by: NURSE PRACTITIONER

## 2022-07-11 ASSESSMENT — PAIN SCALES - GENERAL: PAINLEVEL: NO PAIN (0)

## 2022-07-11 NOTE — H&P
Pre-Operative H & P     CC:  Preoperative exam to assess for increased cardiopulmonary risk while undergoing surgery and anesthesia.    Date of Encounter: 7/11/2022  Primary Care Physician:  Brenda Graves     Reason for visit:   Encounter Diagnoses   Name Primary?     Preop examination Yes     Obstruction of left ureteropelvic junction due to stone        HPI  Jesica Mooney is a 46 year old female who presents for pre-operative H & P in preparation for  Procedure Information     Case: [id number] Date/Time: 07/20/22 1050    Procedure: CYSTOURETEROSCOPY, WITH LITHOTRIPSY USING LASER AND URETERAL STENT INSERTION (Left Flank)    Anesthesia type: General    Diagnosis: Obstruction of left ureteropelvic junction due to stone [N20.1]    Pre-op diagnosis: Obstruction of left ureteropelvic junction due to stone [N20.1]    Location: [place] [address] / [facility]    Providers: Jesus Roper MD        Jesica Mooney has a past medical history for BMI>40, pulmonary emboli, anticoagulated, and Sjogren s syndrome.      Ms. Mooney was seen in urology consultation for further evaluation of painless gross hematuria since April 2022 and evidence of an 8X6X7 mm stone at the left ureteropelvic junction/proximal ureter without upstream dilatation.  Ms. Mooney was seen in urological consultation this past June and counseled on her diagnosis and treatment options.  Ms. Mooney presents to the Preoperative Assessment Center virtually today in preparation for the above procedure.      Of significance, Ms. 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     History is obtained from the patient and chart review    Hx of abnormal bleeding or anti-platelet use: Xarelto    Menstrual history: No LMP recorded. (Menstrual status: IUD).:      Past Medical History  Past Medical History:   Diagnosis Date     IUD (intrauterine device) in place     Mirena inserted 12/28/15     Obese        Past Surgical History  Past Surgical History:   Procedure Laterality Date     BIOPSY  a fews years ago    A mole was removed from by scalp     Z ORAL SURGERY PROCEDURE  10/1990    [place]is[place] teeth extracted       Prior to Admission Medications  Current Outpatient Medications   Medication Sig Dispense Refill     cevimeline (EVOXAC) 30 MG capsule Take 1 capsule (30 mg) by mouth daily 90 capsule 3     levonorgestrel (MIRENA) 20 MCG/DAY IUD 1 each by Intrauterine route once       polyethylene glycol-propylene glycol (SYSTANE ULTRA) 0.4-0.3 % SOLN ophthalmic solution Place 1 drop into both eyes 2 times daily as needed for dry eyes       rivaroxaban ANTICOAGULANT (XARELTO ANTICOAGULANT) 20 MG TABS tablet TAKE 1 TABLET(20 MG) BY MOUTH DAILY WITH DINNER 30 tablet 11     tamsulosin (FLOMAX) 0.4 MG capsule Take 1 capsule (0.4 mg) by mouth daily (Patient not taking: Reported on 7/8/2022) 30 capsule 0       Allergies  Allergies   Allergen Reactions     Keflex [Cephalexin Hcl] Hives     Penicillins Hives     Betadine [Povidone Iodine] Itching     Possibly itchiness, no hives, uncertain allergic reaction.       Social History  Social History     Socioeconomic History     Marital status:      Spouse name: Not on file     Number of children: Not on file     Years of education: Not on file     Highest education level: Not on file   Occupational History     Employer: [employer]     Comment: Scheduling   Tobacco Use     Smoking status: Never Smoker     Smokeless tobacco: Never Used     Tobacco comment: Lives in smoke free household   Substance and Sexual Activity     Alcohol use: No      Alcohol/week: 0.0 standard drinks     Drug use: No     Sexual activity: Yes     Partners: Male     Birth control/protection: I.U.D.   Other Topics Concern     Parent/sibling w/ CABG, MI or angioplasty before 65F 55M? No      Service Yes     Blood Transfusions No     Caffeine Concern No     Occupational Exposure No     Hobby Hazards No     Sleep Concern No     Stress Concern No     Weight Concern No     Special Diet No     Back Care No     Exercise Yes     Bike Helmet No     Seat Belt Yes     Self-Exams Yes   Social History Narrative     Not on file     Social Determinants of Health     Financial Resource Strain: Not on file   Food Insecurity: Not on file   Transportation Needs: Not on file   Physical Activity: Not on file   Stress: Not on file   Social Connections: Not on file   Intimate Partner Violence: Not on file   Housing Stability: Not on file       Family History  Family History   Problem Relation Age of Onset     Eye Disorder Mother         cataracts      Rheumatoid Arthritis Mother      Cancer - colorectal Paternal Grandmother         ?     Thyroid Disease Father      Breast Cancer Maternal Aunt         diagnosed in 30s (mother's 1/2 sister)     Cancer No family hx of      Diabetes No family hx of      Hypertension No family hx of      Cerebrovascular Disease No family hx of      Macular Degeneration No family hx of      Glaucoma No family hx of        Review of Systems  The complete review of systems is negative other than noted in the HPI or here.   Anesthesia Evaluation   Pt has had prior anesthetic. Type of anesthetic: 1990 [place] teeth extraction.     No history of anesthetic complications       ROS/MED HX  ENT/Pulmonary:     (+) SHAHEED risk factors, snores loudly, obese,     Neurologic:  - neg neurologic ROS     Cardiovascular: Comment: Denies cardiac symptoms including chest pain, SOB, palpitations, syncope, GOLDSTEIN, orthopnea, or PND.      (+) -----Taking blood thinners     METS/Exercise Tolerance:  >4 METS Comment: Light exercise.    Hematologic:     (+) History of blood clots, pt is anticoagulated,  (-) anemia and history of blood transfusion   Musculoskeletal:  - neg musculoskeletal ROS     GI/Hepatic:  - neg GI/hepatic ROS     Renal/Genitourinary:     (+) Nephrolithiasis ,     Endo:     (+) Obesity,     Psychiatric/Substance Use:  - neg psychiatric ROS     Infectious Disease: Comment: Completed COVID vaccine and booster.  - neg infectious disease ROS     Malignancy:  - neg malignancy ROS     Other:     Any chance pregnant: IUD/Mirena- does get period.        Virtual visit -  No vitals were obtained    Physical Exam  Constitutional: Awake, alert, cooperative, no apparent distress, and appears stated age.  Eyes: Pupils equal  HENT: Normocephalic  Respiratory: non labored breathing   Neurologic: Awake, alert, oriented to name, place and time.   Neuropsychiatric: Calm, cooperative. Normal affect.      Prior Labs/Diagnostic Studies   All labs and imaging personally reviewed   Lab Results   Component Value Date    WBC 6.9 06/06/2022    WBC 7.6 11/23/2020     Lab Results   Component Value Date    RBC 4.81 06/06/2022    RBC 4.88 11/23/2020     Lab Results   Component Value Date    HGB 14.9 06/06/2022    HGB 15.0 11/23/2020     Lab Results   Component Value Date    HCT 43.8 06/06/2022    HCT 43.6 11/23/2020     Lab Results   Component Value Date    MCV 91 06/06/2022    MCV 89 11/23/2020     Lab Results   Component Value Date    MCH 31.0 06/06/2022    MCH 30.7 11/23/2020     Lab Results   Component Value Date    MCHC 34.0 06/06/2022    MCHC 34.4 11/23/2020     Lab Results   Component Value Date    RDW 13.1 06/06/2022    RDW 12.7 11/23/2020     Lab Results   Component Value Date     06/06/2022     11/23/2020       Last Comprehensive Metabolic Panel:  Sodium   Date Value Ref Range Status   06/06/2022 139 133 - 144 mmol/L Final   11/23/2020 138 133 - 144 mmol/L Final     Potassium   Date Value Ref Range  Status   06/06/2022 4.1 3.4 - 5.3 mmol/L Final   11/23/2020 4.0 3.4 - 5.3 mmol/L Final     Chloride   Date Value Ref Range Status   06/06/2022 107 94 - 109 mmol/L Final   11/23/2020 106 94 - 109 mmol/L Final     Carbon Dioxide   Date Value Ref Range Status   11/23/2020 29 20 - 32 mmol/L Final     Carbon Dioxide (CO2)   Date Value Ref Range Status   06/06/2022 29 20 - 32 mmol/L Final     Anion Gap   Date Value Ref Range Status   06/06/2022 3 3 - 14 mmol/L Final   11/23/2020 3 3 - 14 mmol/L Final     Glucose   Date Value Ref Range Status   06/06/2022 108 (H) 70 - 99 mg/dL Final   11/23/2020 89 70 - 99 mg/dL Final     Comment:     Non Fasting     Urea Nitrogen   Date Value Ref Range Status   06/06/2022 12 7 - 30 mg/dL Final   11/23/2020 14 7 - 30 mg/dL Final     Creatinine   Date Value Ref Range Status   06/06/2022 0.81 0.52 - 1.04 mg/dL Final   11/23/2020 0.84 0.52 - 1.04 mg/dL Final     GFR Estimate   Date Value Ref Range Status   06/06/2022 90 >60 mL/min/1.73m2 Final     Comment:     Effective December 21, 2021 eGFRcr in adults is calculated using the 2021 CKD-EPI creatinine equation which includes age and gender (Mere navarro al., NEJ, DOI: 10.1056/SHJOzq9250908)   11/23/2020 83 >60 mL/min/[1.73_m2] Final     Comment:     Non  GFR Calc  Starting 12/18/2018, serum creatinine based estimated GFR (eGFR) will be   calculated using the Chronic Kidney Disease Epidemiology Collaboration   (CKD-EPI) equation.       Calcium   Date Value Ref Range Status   06/06/2022 9.3 8.5 - 10.1 mg/dL Final   11/23/2020 9.0 8.5 - 10.1 mg/dL Final       Lab Results   Component Value Date    AST 27 06/06/2022    AST 21 11/23/2020     Lab Results   Component Value Date    ALT 46 06/06/2022    ALT 46 11/23/2020     No results found for: BILICONJ   Lab Results   Component Value Date    BILITOTAL 0.4 06/06/2022    BILITOTAL 0.6 11/23/2020     Lab Results   Component Value Date    ALBUMIN 3.5 06/06/2022    ALBUMIN 3.4 11/23/2020      Lab Results   Component Value Date    PROTTOTAL 7.8 06/06/2022    PROTTOTAL 7.4 11/23/2020      Lab Results   Component Value Date    ALKPHOS 59 06/06/2022    ALKPHOS 69 11/23/2020       No results found for: A1C    UA RESULTS:  Recent Labs   Lab Test 06/06/22  0746   COLOR Brown*   APPEARANCE Cloudy*   URINEGLC Negative   URINEBILI Negative   URINEKETONE 15 *   SG >=1.030   UBLD Large*   URINEPH 5.5   PROTEIN 100 *   UROBILINOGEN 0.2   NITRITE Negative   LEUKEST Trace*   RBCU >100*   WBCU 0-5       PROCEDURES     CT abdomen and pelvis 6/21/22     IMPRESSION:      1. 8 x 6 x 7 mm stone at the left ureteropelvic junction/proximal     ureter without upstream dilatation.      2. Nonobstructive tiny right renal stones.      3. Intrauterine device is within the lower uterine segment with one     arm extending posteriorly through the myometrium adjacent to upper     Rectum.     EKG 2015     Normal sinus rhythm      Low voltage QRS      Borderline ECG      No previous ECGs available     The patient's records and results personally reviewed by this provider.     Outside records reviewed from: [facility] Everywhere      Assessment  Jesica Mooney is a 46 year old female seen as a PAC referral for risk assessment and optimization for anesthesia.    Plan/Recommendations  Pt will be optimized for the proposed procedure.  See below for details on the assessment, risk, and preoperative recommendations    NEUROLOGY  - No history of TIA, CVA or seizure  -Post Op delirium risk factors:  No risk identified    ENT  - No current airway concerns.  Will need to be reassessed day of surgery.  Mallampati: Unable to assess  TM: Unable to assess    CARDIAC  - Denies known coronary artery disease.  Denies cardiac symptoms.  - METS (Metabolic Equivalents) >4  - RCRI-Very low risk: Class 1 0.4% complication rate            Total Score: 0        PULMONARY  - Obstructive Sleep Apnea  SHAHEED risk with no formal diagnosis  SHAHEED Low Risk             "Total Score: 2    SHAHEED: Snores loudly    SHAHEED: BMI over 35 kg/m2      - Denies asthma or inhaler use  - Tobacco History   History   Smoking Status     Never Smoker   Smokeless Tobacco     Never Used     Comment: Lives in smoke free household       GI  - Denies GERD  PONV Medium Risk  Total Score: 2           1 AN PONV: Pt is Female    1 AN PONV: Patient is not a current smoker        /RENAL  -  Painless gross hematuria since April 2022 with evidence of an 8X6X7 mm stone at the left ureteropelvic junction/proximal ureter without upstream dilatation on imaging.  Urology recommended above procedure.  UA per urology    Creatinine   Date Value Ref Range Status   06/06/2022 0.81 0.52 - 1.04 mg/dL Final   11/23/2020 0.84 0.52 - 1.04 mg/dL Final         ENDOCRINE    - BMI: Estimated body mass index is 42.16 kg/m  as calculated from the following:    Height as of 6/6/22: 1.613 m (5' 3.5\").    Weight as of 6/6/22: 109.7 kg (241 lb 12.8 oz).  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    ~ Please see Mr. Forde's  (PAC PharmD) note for instructions on Xarelto hold.       The patient is optimized for their procedure. AVS with information on surgery time/arrival time, meds and NPO status given by nursing staff. No further diagnostic testing indicated.    Please refer to the physical examination documented by the anesthesiologist in the anesthesia record on the day of surgery.    Video-Visit Details    Type of " service:  Video Visit    Patient verbally consented to video service today: YES    Video Start Time: 8:26  Video End Time (time video stopped): 8:39    Originating Location (pt. Location): Home    Distant Location (provider location):  [facility] PREOPERATIVE ASSESSMENT CENTER     Mode of Communication:  Video Conference via [employer]  On the day of service:     Prep time: 10 minutes  Visit time: 12 minutes  Documentation time: 14 minutes  ------------------------------------------  Total time: 36minutes      JUDSON Turner CNP  Preoperative Assessment Center  [facility]  Cl[facility] and [facility] Center  Phone: [phone]  Fax: [phone]

## 2022-07-11 NOTE — PATIENT INSTRUCTIONS
Preparing for Your Surgery      Name:  Jesica Mooney   MRN:  [id number]   :  1975   Today's Date:  2022         Arriving for surgery:  Surgery date:  2022  Arrival time:  9:15 am    Restrictions due to COVID 19:    Effective 2022  1 visitor may accompany patient and wait in the surgery waiting room  All visitors must wear a mask and social distance      parking is available for anyone with mobility limitations or disabilities. (Monday- Friday 7 am- 5 pm)    Please come to:    [facility]  [address] [place] [id number]    Please check in on the 5th floor at the Ambulatory Surgery Center       What can I eat or drink?    -  You may eat and drink normally until 8 hours before surgery. (Until 2 am)  -  You may have clear liquids up to 4 hours before surgery. (Until 6:30 am)  Examples of clear liquids:  Water  Clear broth  Juices (apple, white grape, white cranberry  and cider) without pulp  Noncarbonated, powder based beverages  (lemonade and Rich-Aid)  Sodas (Sprite, 7-Up, ginger ale and seltzer)  Coffee or tea (without milk or cream)  Gatorade    --No alcohol for at least 24 hours before surgery    Which medicines can I take?    Hold Aspirin for 7 days before surgery.   Hold Multivitamins for 7 days before surgery.  Hold Supplements for 7 days before surgery.  Hold Ibuprofen (Advil, Motrin) for 1 day before surgery--unless otherwise directed by surgeon.  Hold Naproxen (Aleve) for 4 days before surgery.    Plan for Rivaroxaban (xarelto)---We are checking with your hematologist and will call you with the decision         -  PLEASE TAKE the following medications the day of surgery   Cevimeline (evoxac)  Eye drops per your routine        How do I prepare myself?  - Please take 2 showers before surgery using Scrubcare or Hibiclens soap.    Use this soap only from the neck to your toes.     Leave the soap on your skin for one minute--then rinse  thoroughly.      You may use your own shampoo and conditioner; no other hair products.   - Please remove all jewelry and body piercings.  - No lotions, deodorants or fragrance.  - No makeup or fingernail polish.   - Bring your ID and insurance card.    -If you have a Deep Brain Stimulator, a Spinal Cord Stimulator or any implanted Neuro Device you must bring the remote to the Surgery Center         ALL PATIENTS ARE REQUIRED TO HAVE A RESPONSIBLE ADULT TO DRIVE AND BE IN ATTENDANCE WITH THEM FOR 24 HOURS FOLLOWING SURGERY       Questions or Concerns:    -For questions regarding the day of surgery please contact the Ambulatory Surgery Center at [phone].    -If you have health changes between today and your surgery please contact your surgeon.     - For questions after surgery please contact your surgeon's office     For questions after surgery contact your surgeon

## 2022-07-11 NOTE — PROGRESS NOTES
Gretchen is a 46 year old who is being evaluated via a billable video visit.      How would you like to obtain your AVS? MyChart  If the video visit is dropped, the invitation should be resent by: Text to cell phone: [phone]     HPI       Review of Systems         Objective    Vitals - Patient Reported  Pain Score: No Pain (0)        Physical Exam     .  ..

## 2022-07-11 NOTE — H&P (VIEW-ONLY)
Pre-Operative H & P     CC:  Preoperative exam to assess for increased cardiopulmonary risk while undergoing surgery and anesthesia.    Date of Encounter: 7/11/2022  Primary Care Physician:  Brenda Graves     Reason for visit:   Encounter Diagnoses   Name Primary?     Preop examination Yes     Obstruction of left ureteropelvic junction due to stone        HPI  Jesica Mooney is a 46 year old female who presents for pre-operative H & P in preparation for  Procedure Information     Case: [id number] Date/Time: 07/20/22 1050    Procedure: CYSTOURETEROSCOPY, WITH LITHOTRIPSY USING LASER AND URETERAL STENT INSERTION (Left Flank)    Anesthesia type: General    Diagnosis: Obstruction of left ureteropelvic junction due to stone [N20.1]    Pre-op diagnosis: Obstruction of left ureteropelvic junction due to stone [N20.1]    Location: [place] [address] / [facility]    Providers: Jesus Roper MD        Jesica Mooney has a past medical history for BMI>40, pulmonary emboli, anticoagulated, and Sjogren s syndrome.      Ms. Mooney was seen in urology consultation for further evaluation of painless gross hematuria since April 2022 and evidence of an 8X6X7 mm stone at the left ureteropelvic junction/proximal ureter without upstream dilatation.  Ms. Mooney was seen in urological consultation this past June and counseled on her diagnosis and treatment options.  Ms. Mooney presents to the Preoperative Assessment Center virtually today in preparation for the above procedure.      Of significance, Ms. 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     History is obtained from the patient and chart review    Hx of abnormal bleeding or anti-platelet use: Xarelto    Menstrual history: No LMP recorded. (Menstrual status: IUD).:      Past Medical History  Past Medical History:   Diagnosis Date     IUD (intrauterine device) in place     Mirena inserted 12/28/15     Obese        Past Surgical History  Past Surgical History:   Procedure Laterality Date     BIOPSY  a fews years ago    A mole was removed from by scalp     Z ORAL SURGERY PROCEDURE  10/1990    [place] teeth extracted       Prior to Admission Medications  Current Outpatient Medications   Medication Sig Dispense Refill     cevimeline (EVOXAC) 30 MG capsule Take 1 capsule (30 mg) by mouth daily 90 capsule 3     levonorgestrel (MIRENA) 20 MCG/DAY IUD 1 each by Intrauterine route once       polyethylene glycol-propylene glycol (SYSTANE ULTRA) 0.4-0.3 % SOLN ophthalmic solution Place 1 drop into both eyes 2 times daily as needed for dry eyes       rivaroxaban ANTICOAGULANT (XARELTO ANTICOAGULANT) 20 MG TABS tablet TAKE 1 TABLET(20 MG) BY MOUTH DAILY WITH DINNER 30 tablet 11     tamsulosin (FLOMAX) 0.4 MG capsule Take 1 capsule (0.4 mg) by mouth daily (Patient not taking: Reported on 7/8/2022) 30 capsule 0       Allergies  Allergies   Allergen Reactions     Keflex [Cephalexin Hcl] Hives     Penicillins Hives     Betadine [Povidone Iodine] Itching     Possibly itchiness, no hives, uncertain allergic reaction.       Social History  Social History     Socioeconomic History     Marital status:      Spouse name: Not on file     Number of children: Not on file     Years of education: Not on file     Highest education level: Not on file   Occupational History     Employer: [employer]     Comment: Scheduling   Tobacco Use     Smoking status: Never Smoker     Smokeless tobacco: Never Used     Tobacco comment: Lives in smoke free household   Substance and Sexual Activity     Alcohol use: No      Alcohol/week: 0.0 standard drinks     Drug use: No     Sexual activity: Yes     Partners: Male     Birth control/protection: I.U.D.   Other Topics Concern     Parent/sibling w/ CABG, MI or angioplasty before 65F 55M? No      Service Yes     Blood Transfusions No     Caffeine Concern No     Occupational Exposure No     Hobby Hazards No     Sleep Concern No     Stress Concern No     Weight Concern No     Special Diet No     Back Care No     Exercise Yes     Bike Helmet No     Seat Belt Yes     Self-Exams Yes   Social History Narrative     Not on file     Social Determinants of Health     Financial Resource Strain: Not on file   Food Insecurity: Not on file   Transportation Needs: Not on file   Physical Activity: Not on file   Stress: Not on file   Social Connections: Not on file   Intimate Partner Violence: Not on file   Housing Stability: Not on file       Family History  Family History   Problem Relation Age of Onset     Eye Disorder Mother         cataracts      Rheumatoid Arthritis Mother      Cancer - colorectal Paternal Grandmother         ?     Thyroid Disease Father      Breast Cancer Maternal Aunt         diagnosed in 30s (mother's 1/2 sister)     Cancer No family hx of      Diabetes No family hx of      Hypertension No family hx of      Cerebrovascular Disease No family hx of      Macular Degeneration No family hx of      Glaucoma No family hx of        Review of Systems  The complete review of systems is negative other than noted in the HPI or here.   Anesthesia Evaluation   Pt has had prior anesthetic. Type of anesthetic: 1990 [place] teeth extraction.     No history of anesthetic complications       ROS/MED HX  ENT/Pulmonary:     (+) SHAHEED risk factors, snores loudly, obese,     Neurologic:  - neg neurologic ROS     Cardiovascular: Comment: Denies cardiac symptoms including chest pain, SOB, palpitations, syncope, GOLDSTEIN, orthopnea, or PND.      (+) -----Taking blood thinners     METS/Exercise Tolerance:  >4 METS Comment: Light exercise.    Hematologic:     (+) History of blood clots, pt is anticoagulated,  (-) anemia and history of blood transfusion   Musculoskeletal:  - neg musculoskeletal ROS     GI/Hepatic:  - neg GI/hepatic ROS     Renal/Genitourinary:     (+) Nephrolithiasis ,     Endo:     (+) Obesity,     Psychiatric/Substance Use:  - neg psychiatric ROS     Infectious Disease: Comment: Completed COVID vaccine and booster.  - neg infectious disease ROS     Malignancy:  - neg malignancy ROS     Other:     Any chance pregnant: IUD/Mirena- does get period.        Virtual visit -  No vitals were obtained    Physical Exam  Constitutional: Awake, alert, cooperative, no apparent distress, and appears stated age.  Eyes: Pupils equal  HENT: Normocephalic  Respiratory: non labored breathing   Neurologic: Awake, alert, oriented to name, place and time.   Neuropsychiatric: Calm, cooperative. Normal affect.      Prior Labs/Diagnostic Studies   All labs and imaging personally reviewed   Lab Results   Component Value Date    WBC 6.9 06/06/2022    WBC 7.6 11/23/2020     Lab Results   Component Value Date    RBC 4.81 06/06/2022    RBC 4.88 11/23/2020     Lab Results   Component Value Date    HGB 14.9 06/06/2022    HGB 15.0 11/23/2020     Lab Results   Component Value Date    HCT 43.8 06/06/2022    HCT 43.6 11/23/2020     Lab Results   Component Value Date    MCV 91 06/06/2022    MCV 89 11/23/2020     Lab Results   Component Value Date    MCH 31.0 06/06/2022    MCH 30.7 11/23/2020     Lab Results   Component Value Date    MCHC 34.0 06/06/2022    MCHC 34.4 11/23/2020     Lab Results   Component Value Date    RDW 13.1 06/06/2022    RDW 12.7 11/23/2020     Lab Results   Component Value Date     06/06/2022     11/23/2020       Last Comprehensive Metabolic Panel:  Sodium   Date Value Ref Range Status   06/06/2022 139 133 - 144 mmol/L Final   11/23/2020 138 133 - 144 mmol/L Final     Potassium   Date Value Ref Range  Status   06/06/2022 4.1 3.4 - 5.3 mmol/L Final   11/23/2020 4.0 3.4 - 5.3 mmol/L Final     Chloride   Date Value Ref Range Status   06/06/2022 107 94 - 109 mmol/L Final   11/23/2020 106 94 - 109 mmol/L Final     Carbon Dioxide   Date Value Ref Range Status   11/23/2020 29 20 - 32 mmol/L Final     Carbon Dioxide (CO2)   Date Value Ref Range Status   06/06/2022 29 20 - 32 mmol/L Final     Anion Gap   Date Value Ref Range Status   06/06/2022 3 3 - 14 mmol/L Final   11/23/2020 3 3 - 14 mmol/L Final     Glucose   Date Value Ref Range Status   06/06/2022 108 (H) 70 - 99 mg/dL Final   11/23/2020 89 70 - 99 mg/dL Final     Comment:     Non Fasting     Urea Nitrogen   Date Value Ref Range Status   06/06/2022 12 7 - 30 mg/dL Final   11/23/2020 14 7 - 30 mg/dL Final     Creatinine   Date Value Ref Range Status   06/06/2022 0.81 0.52 - 1.04 mg/dL Final   11/23/2020 0.84 0.52 - 1.04 mg/dL Final     GFR Estimate   Date Value Ref Range Status   06/06/2022 90 >60 mL/min/1.73m2 Final     Comment:     Effective December 21, 2021 eGFRcr in adults is calculated using the 2021 CKD-EPI creatinine equation which includes age and gender (Mere navarro al., NEJ, DOI: 10.1056/OACYur6844729)   11/23/2020 83 >60 mL/min/[1.73_m2] Final     Comment:     Non  GFR Calc  Starting 12/18/2018, serum creatinine based estimated GFR (eGFR) will be   calculated using the Chronic Kidney Disease Epidemiology Collaboration   (CKD-EPI) equation.       Calcium   Date Value Ref Range Status   06/06/2022 9.3 8.5 - 10.1 mg/dL Final   11/23/2020 9.0 8.5 - 10.1 mg/dL Final       Lab Results   Component Value Date    AST 27 06/06/2022    AST 21 11/23/2020     Lab Results   Component Value Date    ALT 46 06/06/2022    ALT 46 11/23/2020     No results found for: BILICONJ   Lab Results   Component Value Date    BILITOTAL 0.4 06/06/2022    BILITOTAL 0.6 11/23/2020     Lab Results   Component Value Date    ALBUMIN 3.5 06/06/2022    ALBUMIN 3.4 11/23/2020      Lab Results   Component Value Date    PROTTOTAL 7.8 06/06/2022    PROTTOTAL 7.4 11/23/2020      Lab Results   Component Value Date    ALKPHOS 59 06/06/2022    ALKPHOS 69 11/23/2020       No results found for: A1C    UA RESULTS:  Recent Labs   Lab Test 06/06/22  0746   COLOR Brown*   APPEARANCE Cloudy*   URINEGLC Negative   URINEBILI Negative   URINEKETONE 15 *   SG >=1.030   UBLD Large*   URINEPH 5.5   PROTEIN 100 *   UROBILINOGEN 0.2   NITRITE Negative   LEUKEST Trace*   RBCU >100*   WBCU 0-5       PROCEDURES     CT abdomen and pelvis 6/21/22     IMPRESSION:      1. 8 x 6 x 7 mm stone at the left ureteropelvic junction/proximal     ureter without upstream dilatation.      2. Nonobstructive tiny right renal stones.      3. Intrauterine device is within the lower uterine segment with one     arm extending posteriorly through the myometrium adjacent to upper     Rectum.     EKG 2015     Normal sinus rhythm      Low voltage QRS      Borderline ECG      No previous ECGs available     The patient's records and results personally reviewed by this provider.     Outside records reviewed from: [facility] [place] Everywhere      Assessment  Jesica Mooney is a 46 year old female seen as a PAC referral for risk assessment and optimization for anesthesia.    Plan/Recommendations  Pt will be optimized for the proposed procedure.  See below for details on the assessment, risk, and preoperative recommendations    NEUROLOGY  - No history of TIA, CVA or seizure  -Post Op delirium risk factors:  No risk identified    ENT  - No current airway concerns.  Will need to be reassessed day of surgery.  Mallampati: Unable to assess  TM: Unable to assess    CARDIAC  - Denies known coronary artery disease.  Denies cardiac symptoms.  - METS (Metabolic Equivalents) >4  - RCRI-Very low risk: Class 1 0.4% complication rate            Total Score: 0        PULMONARY  - Obstructive Sleep Apnea  SHAHEED risk with no formal diagnosis  SHAHEED Low Risk             "Total Score: 2    SHAHEED: Snores loudly    SHAHEED: BMI over 35 kg/m2      - Denies asthma or inhaler use  - Tobacco History   History   Smoking Status     Never Smoker   Smokeless Tobacco     Never Used     Comment: Lives in smoke free household       GI  - Denies GERD  PONV Medium Risk  Total Score: 2           1 AN PONV: Pt is Female    1 AN PONV: Patient is not a current smoker        /RENAL  -  Painless gross hematuria since April 2022 with evidence of an 8X6X7 mm stone at the left ureteropelvic junction/proximal ureter without upstream dilatation on imaging.  Urology recommended above procedure.  UA per urology    Creatinine   Date Value Ref Range Status   06/06/2022 0.81 0.52 - 1.04 mg/dL Final   11/23/2020 0.84 0.52 - 1.04 mg/dL Final         ENDOCRINE    - BMI: Estimated body mass index is 42.16 kg/m  as calculated from the following:    Height as of 6/6/22: 1.613 m (5' 3.5\").    Weight as of 6/6/22: 109.7 kg (241 lb 12.8 oz).  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    ~ Please see Mr. Forde's  (PAC PharmD) note for instructions on Xarelto hold.       The patient is optimized for their procedure. AVS with information on surgery time/arrival time, meds and NPO status given by nursing staff. No further diagnostic testing indicated.    Please refer to the physical examination documented by the anesthesiologist in the anesthesia record on the day of surgery.    Video-Visit Details    Type of " service:  Video Visit    Patient verbally consented to video service today: YES    Video Start Time: 8:26  Video End Time (time video stopped): 8:39    Originating Location (pt. Location): Home    Distant Location (provider location):  [facility] PREOPERAT[facility]     Mode of Communication:  Video Conference via [employer]  On the day of service:     Prep time: 10 minutes  Visit time: 12 minutes  Documentation time: 14 minutes  ------------------------------------------  Total time: 36minutes      JUDSON Turner CNP  Preoperative Assessment Center  [facility]  Clinic and Surgery Center  Phone: [phone]  Fax: [phone]

## 2022-07-13 ENCOUNTER — OFFICE VISIT (OUTPATIENT)
Dept: OPTOMETRY | Facility: CLINIC | Age: 47
End: 2022-07-13
Payer: COMMERCIAL

## 2022-07-13 ENCOUNTER — LAB (OUTPATIENT)
Dept: LAB | Facility: CLINIC | Age: 47
End: 2022-07-13

## 2022-07-13 DIAGNOSIS — H52.223 REGULAR ASTIGMATISM OF BOTH EYES: ICD-10-CM

## 2022-07-13 DIAGNOSIS — H52.13 MYOPIA OF BOTH EYES: Primary | ICD-10-CM

## 2022-07-13 DIAGNOSIS — N20.1 URETERAL STONE: ICD-10-CM

## 2022-07-13 PROCEDURE — 99207 PR NO CHARGE LOS: CPT | Performed by: OPTOMETRIST

## 2022-07-13 PROCEDURE — 87086 URINE CULTURE/COLONY COUNT: CPT

## 2022-07-13 ASSESSMENT — VISUAL ACUITY
OS_CC: 20/30
OS_CC: 20/200
CORRECTION_TYPE: CONTACTS
OD_CC: 20/200-1
OD_CC: 20/30
OD_CC+: -2
OS_CC+: -2
METHOD: SNELLEN - LINEAR

## 2022-07-13 ASSESSMENT — REFRACTION_CURRENTRX
OD_AXIS: 150
OD_CYLINDER: -0.75
OS_AXIS: 180
OS_CYLINDER: -0.75
OS_BRAND: COOPER BIOFINITY TORIC BC 8.7, D 14.5
OS_SPHERE: -8.00
OD_BRAND: COOPER BIOFINITY TORIC BC 8.7, D 14.5
OD_SPHERE: -8.50

## 2022-07-13 NOTE — PATIENT INSTRUCTIONS
Jesica Mooney          1975     [id number]     Procedure      Wearing Schedule 1st Week    Wash hands with oil/perfume free soap.   Day 1    4 hours  Cleanse and disinfect contacts daily.    Day 2    6 hours  Clean_________________________   Day 3    8 hours  Rinse_________________________   Day 4    8 hours  Disinfect______________________    Day 5    10 hours  Rewet________________________    Day 6    10 hours          Day 7    10 hours  Use only eye drops made for contact lenses.  Return in 1-2 weeks for contact check appointment-Come in wearing your contacts.    Replacement Schedule  Replace in    Sleeping in contacts is NOT recommended.    Sleeping contact lenses increases the risk of contact lens related problems such as but not limited to corneal ulceration,  infiltrates, conjunctivitis, and neovascularization.  Not all contacts are approved for overnight wear.  Make sure you are using your contacts as they are intended.    Congratulations! You have been fitted with contact lenses.  Please follow these simple steps to insure successful contact lens wear.  1.  If your lenses are uncomfortable, cause redness, pain, or blurry vision discontinue wear immediately and call [facility] for an appointment at [phone].  2. Return to Optometry contact lens checks and yearly eye exams.  3. Never wear lenses longer than the prescribed time.  Maximum wearing time of 12  hours a day.  4. Use only prescribed solutions because mixing brands or types may result in problems.  5. Never wear a torn, discolored, scratched, or chipped lens for any reason.  6. Always follow your doctor and 's recommendations.  7. Use only water-soluble cosmetics, especially mascara.  8. Always have a current pair of eyeglasses available.  9. Do not wear contacts while soaking in a hot tub or while swimming.  10. Only FDA approved extended wear contacts are suitable for sleeping.    I have read and  understand all the enclosed material and have been instructed on insertion, removal, and care of my contact lenses.    X_______________________________________________          RETURN TO CLINIC  for contact lens check appointment in 1-2 weeks.  Use contact lens rewetting drops at least twice a day.  Refresh Relieva for contacts, Blink Contacts rewetting drop, Systane Contact rewetting drop and Refresh Contact rewetting drop    Wear contact lenses at least 2 hours before appointment.    Minnie Akers, OD

## 2022-07-13 NOTE — PROGRESS NOTES
Chief Complaint   Patient presents with     Contact Lens Insertion and Removal     Patient has worn RGP lenses, years ago.        Replacement : monthly   Solution :BioTrue  Skill level :excellent  Class duration: 25 minutes    Liudmila Tariq Optometric Assistant     OBJECTIVE: See Ophthalmology exam     ASSESSMENT:    ICD-10-CM    1. Myopia of both eyes  H52.13    2. Regular astigmatism of both eyes  H52.223            PLAN:  Patient Instructions   Jesica Mooney          1975     [id number]     Procedure      Wearing Schedule 1st Week    Wash hands with oil/perfume free soap.   Day 1    4 hours  Cleanse and disinfect contacts daily.    Day 2    6 hours  Clean_________________________   Day 3    8 hours  Rinse_________________________   Day 4    8 hours  Disinfect______________________    Day 5    10 hours  Rewet________________________    Day 6    10 hours          Day 7    10 hours  Use only eye drops made for contact lenses.  Return in 1-2 weeks for contact check appointment-Come in wearing your contacts.    Replacement Schedule  Replace in    Sleeping in contacts is NOT recommended.    Sleeping contact lenses increases the risk of contact lens related problems such as but not limited to corneal ulceration,  infiltrates, conjunctivitis, and neovascularization.  Not all contacts are approved for overnight wear.  Make sure you are using your contacts as they are intended.    Congratulations! You have been fitted with contact lenses.  Please follow these simple steps to insure successful contact lens wear.  1.  If your lenses are uncomfortable, cause redness, pain, or blurry vision discontinue wear immediately and call [facility] for an appointment at [phone].  2. Return to Optometry contact lens checks and yearly eye exams.  3. Never wear lenses longer than the prescribed time.  Maximum wearing time of 12  hours a day.  4. Use only prescribed solutions because mixing brands or types may result in  problems.  5. Never wear a torn, discolored, scratched, or chipped lens for any reason.  6. Always follow your doctor and 's recommendations.  7. Use only water-soluble cosmetics, especially mascara.  8. Always have a current pair of eyeglasses available.  9. Do not wear contacts while soaking in a hot tub or while swimming.  10. Only FDA approved extended wear contacts are suitable for sleeping.    I have read and understand all the enclosed material and have been instructed on insertion, removal, and care of my contact lenses.    X_______________________________________________          RETURN TO CLINIC  for contact lens check appointment in 1-2 weeks.  Use contact lens rewetting drops at least twice a day.  Refresh Relieva for contacts, Blink Contacts rewetting drop, Systane Contact rewetting drop and Refresh Contact rewetting drop    Wear contact lenses at least 2 hours before appointment.    Minnie Akers, OD

## 2022-07-13 NOTE — LETTER
7/13/2022         RE: Jesica Mooney  [address] [address]        Dear Colleague,    [facility] you [facility] Please see a copy of my visit note below.    Chief Complaint   Patient presents with     Contact Lens Insertion and Removal     Patient has worn RGP lenses, years ago.        Replacement : monthly   Solution :BioTrue  Skill level :excellent  Class duration: 25 minutes    Liudmila Apple Optometric Assistant     OBJECTIVE: See Ophthalmology exam     ASSESSMENT:    ICD-10-CM    1. Myopia of both eyes  H52.13    2. Regular astigmatism of both eyes  H52.223            PLAN:  Patient Instructions   Jesica Mooney          1975     [id number]     Procedure      Wearing Schedule 1st Week    Wash hands with oil/perfume free soap.   Day 1    4 hours  Cleanse and disinfect contacts daily.    Day 2    6 hours  Clean_________________________   Day 3    8 hours  Rinse_________________________   Day 4    8 hours  Disinfect______________________    Day 5    10 hours  Rewet________________________    Day 6    10 hours          Day 7    10 hours  Use only eye drops made for contact lenses.  Return in 1-2 weeks for contact check appointment-Come in wearing your contacts.    Replacement Schedule  Replace in    Sleeping in contacts is NOT recommended.    Sleeping contact lenses increases the risk of contact lens related problems such as but not limited to corneal ulceration,  infiltrates, conjunctivitis, and neovascularization.  Not all contacts are approved for overnight wear.  Make sure you are using your contacts as they are intended.    Congratulations! You have been fitted with contact lenses.  Please follow these simple steps to insure successful contact lens wear.  1.  If your lenses are uncomfortable, cause redness, pain, or blurry vision discontinue wear immediately and call [facility] for an appointment  at [phone].  2. Return to Optometry contact lens checks and yearly eye exams.  3. Never wear lenses longer than the prescribed time.  Maximum wearing time of 12  hours a day.  4. Use only prescribed solutions because mixing brands or types may result in problems.  5. Never wear a torn, discolored, scratched, or chipped lens for any reason.  6. Always follow your doctor and 's recommendations.  7. Use only water-soluble cosmetics, especially mascara.  8. Always have a current pair of eyeglasses available.  9. Do not wear contacts while soaking in a hot tub or while swimming.  10. Only FDA approved extended wear contacts are suitable for sleeping.    I have read and understand all the enclosed material and have been instructed on insertion, removal, and care of my contact lenses.    X_______________________________________________          RETURN TO CLINIC  for contact lens check appointment in 1-2 weeks.  Use contact lens rewetting drops at least twice a day.  Refresh Relieva for contacts, Blink Contacts rewetting drop, Systane Contact rewetting drop and Refresh Contact rewetting drop    Wear contact lenses at least 2 hours before appointment.    Minnie Akers, OD              Again, thank you for allowing me to participate in the care of your patient.        Sincerely,        Minnie Akers, OD

## 2022-07-15 LAB — BACTERIA UR CULT: NORMAL

## 2022-07-18 ENCOUNTER — PATIENT OUTREACH (OUTPATIENT)
Dept: UROLOGY | Facility: CLINIC | Age: 47
End: 2022-07-18

## 2022-07-18 DIAGNOSIS — N39.0 URINARY TRACT INFECTION: Primary | ICD-10-CM

## 2022-07-18 RX ORDER — CIPROFLOXACIN 500 MG/1
500 TABLET, FILM COATED ORAL 2 TIMES DAILY
Qty: 10 TABLET | Refills: 0 | Status: SHIPPED | OUTPATIENT
Start: 2022-07-18 | End: 2022-07-23

## 2022-07-18 NOTE — TELEPHONE ENCOUNTER
Per Dr. Roper, cipro 500 BID x 5 days today in prep for surgery wed. Pt phoned with request to return call with preferred pharmacy, she has a few listed as preferred. Will wait for return call. mychart sent as well.     Pt returned call, rx sent

## 2022-07-20 ENCOUNTER — HOSPITAL ENCOUNTER (OUTPATIENT)
Facility: AMBULATORY SURGERY CENTER | Age: 47
Discharge: HOME | End: 2022-07-20
Attending: UROLOGY
Payer: COMMERCIAL

## 2022-07-20 ENCOUNTER — ANESTHESIA (OUTPATIENT)
Dept: SURGERY | Facility: AMBULATORY SURGERY CENTER | Age: 47
End: 2022-07-20
Payer: COMMERCIAL

## 2022-07-20 VITALS
DIASTOLIC BLOOD PRESSURE: 85 MMHG | RESPIRATION RATE: 16 BRPM | HEIGHT: 63 IN | BODY MASS INDEX: 42.17 KG/M2 | TEMPERATURE: 97.6 F | SYSTOLIC BLOOD PRESSURE: 125 MMHG | HEART RATE: 71 BPM | WEIGHT: 238 LBS | OXYGEN SATURATION: 98 %

## 2022-07-20 DIAGNOSIS — N20.1 OBSTRUCTION OF LEFT URETEROPELVIC JUNCTION DUE TO STONE: ICD-10-CM

## 2022-07-20 DIAGNOSIS — N20.1 URETERAL STONE: Primary | ICD-10-CM

## 2022-07-20 LAB
HCG UR QL: NEGATIVE
INTERNAL QC OK POCT: NORMAL
POCT KIT EXPIRATION DATE: NORMAL
POCT KIT LOT NUMBER: NORMAL

## 2022-07-20 PROCEDURE — 87077 CULTURE AEROBIC IDENTIFY: CPT | Mod: 90 | Performed by: PATHOLOGY

## 2022-07-20 PROCEDURE — 52356 CYSTO/URETERO W/LITHOTRIPSY: CPT | Mod: LT | Performed by: UROLOGY

## 2022-07-20 PROCEDURE — 74420 UROGRAPHY RTRGR +-KUB: CPT | Mod: TC

## 2022-07-20 PROCEDURE — 52356 CYSTO/URETERO W/LITHOTRIPSY: CPT | Mod: LT

## 2022-07-20 PROCEDURE — 87070 CULTURE OTHR SPECIMN AEROBIC: CPT | Mod: 90 | Performed by: PATHOLOGY

## 2022-07-20 PROCEDURE — 82365 CALCULUS SPECTROSCOPY: CPT | Mod: 90 | Performed by: PATHOLOGY

## 2022-07-20 PROCEDURE — 74420 UROGRAPHY RTRGR +-KUB: CPT | Mod: 26 | Performed by: UROLOGY

## 2022-07-20 PROCEDURE — 81025 URINE PREGNANCY TEST: CPT | Performed by: PATHOLOGY

## 2022-07-20 PROCEDURE — 99000 SPECIMEN HANDLING OFFICE-LAB: CPT | Performed by: PATHOLOGY

## 2022-07-20 DEVICE — STENT URETERAL PERCUFLEX PLUS 6FRX24CM M0061752620: Type: IMPLANTABLE DEVICE | Site: URETER | Status: FUNCTIONAL

## 2022-07-20 RX ORDER — LEVOFLOXACIN 5 MG/ML
500 INJECTION, SOLUTION INTRAVENOUS EVERY 24 HOURS
Status: DISCONTINUED | OUTPATIENT
Start: 2022-07-20 | End: 2022-07-20 | Stop reason: HOSPADM

## 2022-07-20 RX ORDER — FENTANYL CITRATE 50 UG/ML
INJECTION, SOLUTION INTRAMUSCULAR; INTRAVENOUS PRN
Status: DISCONTINUED | OUTPATIENT
Start: 2022-07-20 | End: 2022-07-20

## 2022-07-20 RX ORDER — DIAZEPAM 10 MG/2ML
2.5 INJECTION, SOLUTION INTRAMUSCULAR; INTRAVENOUS
Status: DISCONTINUED | OUTPATIENT
Start: 2022-07-20 | End: 2022-07-20 | Stop reason: HOSPADM

## 2022-07-20 RX ORDER — LIDOCAINE HYDROCHLORIDE 20 MG/ML
INJECTION, SOLUTION INFILTRATION; PERINEURAL PRN
Status: DISCONTINUED | OUTPATIENT
Start: 2022-07-20 | End: 2022-07-20

## 2022-07-20 RX ORDER — FENTANYL CITRATE 50 UG/ML
25 INJECTION, SOLUTION INTRAMUSCULAR; INTRAVENOUS EVERY 5 MIN PRN
Status: DISCONTINUED | OUTPATIENT
Start: 2022-07-20 | End: 2022-07-20 | Stop reason: HOSPADM

## 2022-07-20 RX ORDER — DEXAMETHASONE SODIUM PHOSPHATE 4 MG/ML
INJECTION, SOLUTION INTRA-ARTICULAR; INTRALESIONAL; INTRAMUSCULAR; INTRAVENOUS; SOFT TISSUE PRN
Status: DISCONTINUED | OUTPATIENT
Start: 2022-07-20 | End: 2022-07-20

## 2022-07-20 RX ORDER — ONDANSETRON 4 MG/1
4 TABLET, ORALLY DISINTEGRATING ORAL EVERY 30 MIN PRN
Status: DISCONTINUED | OUTPATIENT
Start: 2022-07-20 | End: 2022-07-21 | Stop reason: HOSPADM

## 2022-07-20 RX ORDER — ONDANSETRON 2 MG/ML
INJECTION INTRAMUSCULAR; INTRAVENOUS PRN
Status: DISCONTINUED | OUTPATIENT
Start: 2022-07-20 | End: 2022-07-20

## 2022-07-20 RX ORDER — FENTANYL CITRATE 50 UG/ML
25 INJECTION, SOLUTION INTRAMUSCULAR; INTRAVENOUS
Status: DISCONTINUED | OUTPATIENT
Start: 2022-07-20 | End: 2022-07-21 | Stop reason: HOSPADM

## 2022-07-20 RX ORDER — PROPOFOL 10 MG/ML
INJECTION, EMULSION INTRAVENOUS PRN
Status: DISCONTINUED | OUTPATIENT
Start: 2022-07-20 | End: 2022-07-20

## 2022-07-20 RX ORDER — TAMSULOSIN HYDROCHLORIDE 0.4 MG/1
0.4 CAPSULE ORAL DAILY
Qty: 7 CAPSULE | Refills: 0 | Status: SHIPPED | OUTPATIENT
Start: 2022-07-20 | End: 2022-08-30

## 2022-07-20 RX ORDER — SODIUM CHLORIDE, SODIUM LACTATE, POTASSIUM CHLORIDE, CALCIUM CHLORIDE 600; 310; 30; 20 MG/100ML; MG/100ML; MG/100ML; MG/100ML
INJECTION, SOLUTION INTRAVENOUS CONTINUOUS
Status: DISCONTINUED | OUTPATIENT
Start: 2022-07-20 | End: 2022-07-21 | Stop reason: HOSPADM

## 2022-07-20 RX ORDER — PROPOFOL 10 MG/ML
INJECTION, EMULSION INTRAVENOUS CONTINUOUS PRN
Status: DISCONTINUED | OUTPATIENT
Start: 2022-07-20 | End: 2022-07-20

## 2022-07-20 RX ORDER — LIDOCAINE 40 MG/G
CREAM TOPICAL
Status: DISCONTINUED | OUTPATIENT
Start: 2022-07-20 | End: 2022-07-20 | Stop reason: HOSPADM

## 2022-07-20 RX ORDER — DEXAMETHASONE SODIUM PHOSPHATE 10 MG/ML
4 INJECTION, SOLUTION INTRAMUSCULAR; INTRAVENOUS EVERY 10 MIN PRN
Status: DISCONTINUED | OUTPATIENT
Start: 2022-07-20 | End: 2022-07-21 | Stop reason: HOSPADM

## 2022-07-20 RX ORDER — OXYCODONE HYDROCHLORIDE 5 MG/1
5 TABLET ORAL EVERY 4 HOURS PRN
Status: DISCONTINUED | OUTPATIENT
Start: 2022-07-20 | End: 2022-07-21 | Stop reason: HOSPADM

## 2022-07-20 RX ORDER — EPHEDRINE SULFATE 50 MG/ML
INJECTION, SOLUTION INTRAMUSCULAR; INTRAVENOUS; SUBCUTANEOUS PRN
Status: DISCONTINUED | OUTPATIENT
Start: 2022-07-20 | End: 2022-07-20

## 2022-07-20 RX ORDER — MEPERIDINE HYDROCHLORIDE 25 MG/ML
12.5 INJECTION INTRAMUSCULAR; INTRAVENOUS; SUBCUTANEOUS
Status: DISCONTINUED | OUTPATIENT
Start: 2022-07-20 | End: 2022-07-21 | Stop reason: HOSPADM

## 2022-07-20 RX ORDER — ONDANSETRON 2 MG/ML
4 INJECTION INTRAMUSCULAR; INTRAVENOUS EVERY 30 MIN PRN
Status: DISCONTINUED | OUTPATIENT
Start: 2022-07-20 | End: 2022-07-21 | Stop reason: HOSPADM

## 2022-07-20 RX ORDER — ALBUTEROL SULFATE 0.83 MG/ML
2.5 SOLUTION RESPIRATORY (INHALATION) EVERY 4 HOURS PRN
Status: DISCONTINUED | OUTPATIENT
Start: 2022-07-20 | End: 2022-07-20 | Stop reason: HOSPADM

## 2022-07-20 RX ORDER — SODIUM CHLORIDE, SODIUM LACTATE, POTASSIUM CHLORIDE, CALCIUM CHLORIDE 600; 310; 30; 20 MG/100ML; MG/100ML; MG/100ML; MG/100ML
INJECTION, SOLUTION INTRAVENOUS CONTINUOUS
Status: DISCONTINUED | OUTPATIENT
Start: 2022-07-20 | End: 2022-07-20 | Stop reason: HOSPADM

## 2022-07-20 RX ORDER — HYDROMORPHONE HYDROCHLORIDE 1 MG/ML
0.2 INJECTION, SOLUTION INTRAMUSCULAR; INTRAVENOUS; SUBCUTANEOUS EVERY 5 MIN PRN
Status: DISCONTINUED | OUTPATIENT
Start: 2022-07-20 | End: 2022-07-20 | Stop reason: HOSPADM

## 2022-07-20 RX ORDER — ACETAMINOPHEN 325 MG/1
975 TABLET ORAL ONCE
Status: COMPLETED | OUTPATIENT
Start: 2022-07-20 | End: 2022-07-20

## 2022-07-20 RX ORDER — IOPAMIDOL 612 MG/ML
INJECTION, SOLUTION INTRAVASCULAR PRN
Status: DISCONTINUED | OUTPATIENT
Start: 2022-07-20 | End: 2022-07-20 | Stop reason: HOSPADM

## 2022-07-20 RX ADMIN — EPHEDRINE SULFATE 10 MG: 50 INJECTION, SOLUTION INTRAMUSCULAR; INTRAVENOUS; SUBCUTANEOUS at 11:05

## 2022-07-20 RX ADMIN — DEXAMETHASONE SODIUM PHOSPHATE 4 MG: 4 INJECTION, SOLUTION INTRA-ARTICULAR; INTRALESIONAL; INTRAMUSCULAR; INTRAVENOUS; SOFT TISSUE at 10:48

## 2022-07-20 RX ADMIN — FENTANYL CITRATE 50 MCG: 50 INJECTION, SOLUTION INTRAMUSCULAR; INTRAVENOUS at 10:58

## 2022-07-20 RX ADMIN — SODIUM CHLORIDE, SODIUM LACTATE, POTASSIUM CHLORIDE, CALCIUM CHLORIDE: 600; 310; 30; 20 INJECTION, SOLUTION INTRAVENOUS at 10:40

## 2022-07-20 RX ADMIN — ONDANSETRON 4 MG: 2 INJECTION INTRAMUSCULAR; INTRAVENOUS at 10:48

## 2022-07-20 RX ADMIN — ACETAMINOPHEN 975 MG: 325 TABLET ORAL at 10:10

## 2022-07-20 RX ADMIN — LIDOCAINE HYDROCHLORIDE 100 MG: 20 INJECTION, SOLUTION INFILTRATION; PERINEURAL at 10:48

## 2022-07-20 RX ADMIN — PROPOFOL 150 MCG/KG/MIN: 10 INJECTION, EMULSION INTRAVENOUS at 10:48

## 2022-07-20 RX ADMIN — LEVOFLOXACIN 500 MG: 5 INJECTION, SOLUTION INTRAVENOUS at 10:10

## 2022-07-20 RX ADMIN — PROPOFOL 200 MG: 10 INJECTION, EMULSION INTRAVENOUS at 10:48

## 2022-07-20 RX ADMIN — SODIUM CHLORIDE, SODIUM LACTATE, POTASSIUM CHLORIDE, CALCIUM CHLORIDE: 600; 310; 30; 20 INJECTION, SOLUTION INTRAVENOUS at 10:10

## 2022-07-20 NOTE — PROGRESS NOTES
Request sent to schedulers to arrange PERFECTO and KUB in 6 weeks and a follow-up with Indu NEWSOME, EMT  Urology Clinic

## 2022-07-20 NOTE — INTERVAL H&P NOTE
"I have reviewed the surgical (or preoperative) H&P that is linked to this encounter, and examined the patient. There are no significant changes    Clinical Conditions Present on Arrival:  Clinically Significant Risk Factors Present on Admission                 # Coagulation Defect: home medication list includes an anticoagulant medication   # Severe Obesity: Estimated body mass index is 42.16 kg/m  as calculated from the following:    Height as of this encounter: 1.6 m (5' 3\").    Weight as of this encounter: 108 kg (238 lb).       "

## 2022-07-20 NOTE — OP NOTE
OPERATIVE REPORT    PREOPERATIVE DIAGNOSIS:  Left ureteral stone(s)    POSTOPERATIVE DIAGNOSIS: Same    PROCEDURES PERFORMED:   -Cystourethroscopy  -Left retrograde pyelogram with intraoperative interpretation of images  -Left ureteroscopy  -Left holmium laser lithotripsy  -Left basket stone retrieval  -Left ureteral stent placement    STAFF SURGEON: Dr. Judson MD  RESIDENT(S): Kyle Cummins MD  Medical Student: Tosin Guajardo    ANESTHESIA: General  ESTIMATED BLOOD LOSS: 1 ml  COMPLICATIONS: None.   SPECIMEN: Stone for analysis   URETERAL STENT(S):  - Left 6 x 24 cm double-J ureteral stent.  Reason for stenting: Other (ureteral access sheath, edema at proximal uretr).      SIGNIFICANT FINDINGS:   -Stone free with no fragments > 2mm on the left  -Left RPG notable for mild hydronephrosis, no filling defects.     Target stone location:Ureter  Approximate target stone size: 5-10 mm  Laser used: Yes  Multiple stones treated: No      BRIEF OPERATIVE INDICATIONS: Jesica Mooney is a(n) 46 year old female who presented with gross hematuria. CT demonstrated a left 8mm UPJ ureteral stone. After a discussion of all risks, benefits, and alternatives, the patient elected to proceed with definitive stone management. The patient understands the potential need for more than one procedure to eliminate all stone burden.      DESCRIPTION OF PROCEDURE:  After informed consent was obtained, the patient was transported to the operating room & placed supine on the table. After adequate anesthesia was induced, the patient was placed in lithotomy and prepped and draped in the usual sterile fashion. A timeout was taken to confirm correct patient, procedure and laterality. Pre-operative IV antibiotics were administered.     A 22-Swedish rigid cystoscope was inserted into a well-lubricated urethra. The urethra was unremarkable without stricture. A thorough white light cystoscopy was performed demonstrating likely squamous metaplasia at  the bladder neck w/o other abnormalities of the bladder. Bilateral ureteral orifices orthotopic.     The left ureteral orifice was identified and cannulated with a Sensor wire with the aid of a 5F open-ended catheter. The wire passed without resistance into the upper pole.  A retrograde pyelogram was then performed revealing mild hydronephrosis     A 36 cm 11/13 ureteral access sheath was advanced over the working wire which was subsequently removed.  A flexible ureteroscope was then introduced into the sheath.  URS revealed edema at the UPJ, likely where the stone had been present. In the renal pelvis a 8mm stone was visualized. This was moved to the upper pole with a halo basket. A 200 micron laser fiber was used at a setting of 0.8 J and 8 Hz and lithotripsy was performed.  A tipless basket was used to remove all fragments greater than 2 mm.      Pullback ureteroscopy showed no residual stone fragments or significant ureteral injury.    A 6 x 24 cm double-J stent was advanced over the Sensor wire, and a good proximal curl was seen in the renal pelvis on fluoroscopy and the distal curl was seen in the bladder. A string was left attached to the stent.  The bladder was drained.            The patient tolerated the procedure well and there were no apparent complications. The patient  was transported to the postanesthesia care unit in stable condition.        POSTOP PLAN:  -stent removal Monday at home  -complete pre-op antibiotics  -6 weeks RBUS and KUB w/ PA       Disposable items prior to timeout:  NEGATIVE  - None    Kyle Cummins  PGY-3  [phone]    I, Jesus Roper, was present and participatory for the entirety of the procedure

## 2022-07-20 NOTE — ANESTHESIA CARE TRANSFER NOTE
Patient: Jesica Mooney    Procedure: Procedure(s):  LEFT CYSTOURETEROSCOPY, WITH LITHOTRIPSY USING LASER AND URETERAL STENT INSERTION       Diagnosis: Obstruction of left ureteropelvic junction due to stone [N20.1]  Diagnosis Additional Information: No value filed.    Anesthesia Type:   General     Note:    Oropharynx: oral airway in place  Level of Consciousness: awake and drowsy  Oxygen Supplementation: face mask  Level of Supplemental Oxygen (L/min / FiO2): 6  Independent Airway: airway patency satisfactory and stable  Dentition: dentition unchanged  Vital Signs Stable: post-procedure vital signs reviewed and stable  Report to RN Given: handoff report given  Patient transferred to: PACU  Comments: VSS and WNL, comfortable, no PONV, report to Carleen RN  Handoff Report: Identifed the Patient, Identified the Reponsible Provider, Reviewed the pertinent medical history, Discussed the surgical course, Reviewed Intra-OP anesthesia mangement and issues during anesthesia, Set expectations for post-procedure period and Allowed opportunity for questions and acknowledgement of understanding      Vitals:  Vitals Value Taken Time   /68 07/20/22 1154   Temp 36.3  C (97.3  F) 07/20/22 1154   Pulse 70 07/20/22 1155   Resp 20 07/20/22 1155   SpO2 99 % 07/20/22 1155   Vitals shown include unvalidated device data.    Electronically Signed By: JUDSON Evangelista CRNA  July 20, 2022  11:56 AM

## 2022-07-20 NOTE — DISCHARGE INSTRUCTIONS
[facility]  Home Care Following Anesthesia  For 24 hours after surgery:  Get plenty of rest.  A responsible adult must stay with you for at least 24 hours after you leave the surgery center.  Do not drive or use heavy equipment.  If you have weakness or tingling, don't drive or use heavy equipment until this feeling goes away.   Do not drink alcohol.   Avoid strenuous or risky activities.  Ask for help when climbing stairs.  You may feel lightheaded.  IF so, sit for a few minutes before standing.  Have someone help you get up.   If you have nausea (feel sick to your stomach): Drink only clear liquids such as apple juice, ginger ale, broth or 7-Up.  Rest may also help.  Be sure to drink enough fluids.  Move to a regular diet as you feel able.   You may have a slight fever.  Call the doctor if your fever is over 100 F (37.7 C) (taken under the tongue) or lasts longer than 24 hours.  You may have a dry mouth, a sore throat, muscle aches or trouble sleeping. These should go away after 24 hours.  Do not make important or legal decisions.   It is recommended to avoid smoking.   If you use hormonal birth control (such as the pill, patch, ring or implants):  You will need a second form of birth control for 7 days (condoms, a diaphragm or contraceptive foam).  While in the surgery center, you received a medicine called Sugammadex.  Hormonal birth control (such as the pill, patch, ring or implants) will not work as well for a week after taking this medicine.  Tips for taking pain medications  To get the best pain relief possible, remember these points:  Take pain medications as directed, before pain becomes severe.  Pain medication can upset your stomach: taking it with food may help.  Constipation is a common side effect of pain medication. Drink plenty of  fluids.  Eat foods high in fiber. Take a stool softener if recommended by your doctor or pharmacist.  Do not drink alcohol, drive or  operate machinery while taking pain medications.  Ask about other ways to control pain, such as with heat, ice or relaxation.    Tylenol/Acetaminophen Consumption  To help encourage the safe use of acetaminophen, the makers of TYLENOL  have lowered the maximum daily dose for single-ingredient Extra Strength TYLENOL  (acetaminophen) products sold in the [place] from 8 pills per day (4,000 mg) to 6 pills per day (3,000 mg). The dosing interval has also changed from 2 pills every 4-6 hours to 2 pills every 6 hours.  If you feel your pain relief is insufficient, you may take Tylenol/Acetaminophen in addition to your narcotic pain medication.   Be careful not to exceed 3,000 mg of Tylenol/Acetaminophen in a 24 hour period from all sources.  If you are taking extra strength Tylenol/acetaminophen (500 mg), the maximum dose is 6 tablets in 24 hours.  If you are taking regular strength acetaminophen (325 mg), the maximum dose is 9 tablets in 24 hours.    Call a doctor for any of the following:  Signs of infection (fever, growing tenderness at the surgery site, a large amount of drainage or bleeding, severe pain, foul-smelling drainage, redness, swelling).  It has been over 8 to 10 hours since surgery and you are still not able to urinate (pass water).  Headache for over 24 hours.  Numbness, tingling or weakness the day after surgery (if you had spinal anesthesia).  Signs of Covid-19 infection (temperature over 100 degrees, shortness of breath, cough, loss of taste/smell, generalized body aches, persistent headache, chills, sore throat, nausea/vomiting/diarrhea)  Your doctor is:  Dr. Jesus Roper, Prostate and Urology: [phone]  Or dial [phone] and ask for the resident on call for:  Prostate Urology  For emergency care, call the:  [facility]:  [phone] (TTY for hearing impaired: [phone])  Tylenol 975 mg given at 1010 am.  Next available dose at 410 pm.                   Discharge  Instructions Following a Cystoscopy, Stent and/or Ureteroscopy    Drainage/Urination:  Urine may be slightly bloody but should taper off in a few days.  You may have some frequency and urgency for a few days.    Comfort:  A burning sensation when urinating is common. Drinking extra fluids helps decrease this burning feeling and also helps to clear the bloody urine.  Mild abdominal cramps may occur for a few days.    Baths:  Daily tub baths aide in passing urine.  Frequent use of bubble bath is discouraged since it encourages urinary tract infections.    Report to your doctor at once if you experience:  Inability to pass your urine  Continuous or heavy bleeding  Severe Pain  Elevated temperature > than 101.5 for 24 hours    Home Activity:  On the day of surgery spend a quiet evening at home.  Increase activity as tolerated.

## 2022-07-20 NOTE — ANESTHESIA POSTPROCEDURE EVALUATION
Patient: Jesica Mooney    Procedure: Procedure(s):  LEFT CYSTOURETEROSCOPY, WITH LITHOTRIPSY USING LASER AND URETERAL STENT INSERTION       Anesthesia Type:  General    Note:  Disposition: Outpatient   Postop Pain Control: Uneventful            Sign Out: Well controlled pain   PONV: No   Neuro/Psych: Uneventful            Sign Out: Acceptable/Baseline neuro status   Airway/Respiratory: Uneventful            Sign Out: Acceptable/Baseline resp. status   CV/Hemodynamics: Uneventful            Sign Out: Acceptable CV status; No obvious hypovolemia; No obvious fluid overload   Other NRE: NONE   DID A NON-ROUTINE EVENT OCCUR? No           Last vitals:  Vitals Value Taken Time   /78 07/20/22 1215   Temp 36.6  C (97.8  F) 07/20/22 1215   Pulse 76 07/20/22 1223   Resp 6 07/20/22 1223   SpO2 92 % 07/20/22 1223   Vitals shown include unvalidated device data.    Electronically Signed By: Tawanda Hansen MD, MD  July 20, 2022  12:41 PM

## 2022-07-20 NOTE — ANESTHESIA PREPROCEDURE EVALUATION
Anesthesia Pre-Procedure Evaluation    Patient: Jesica Mooney   MRN: [id number] : 1975        Procedure : Procedure(s):  CYSTOURETEROSCOPY, WITH LITHOTRIPSY USING LASER AND URETERAL STENT INSERTION          Past Medical History:   Diagnosis Date     IUD (intrauterine device) in place     Mirena inserted 12/28/15     Obese       Past Surgical History:   Procedure Laterality Date     BIOPSY  a fews years ago    A mole was removed from by scalp     ZZC ORAL SURGERY PROCEDURE  10/1990    wisdom teeth extracted      Allergies   Allergen Reactions     Keflex [Cephalexin Hcl] Hives     Penicillins Hives     Betadine [Povidone Iodine] Itching     Possibly itchiness, no hives, uncertain allergic reaction.      Social History     Tobacco Use     Smoking status: Never Smoker     Smokeless tobacco: Never Used     Tobacco comment: Lives in smoke free household   Substance Use Topics     Alcohol use: No     Alcohol/week: 0.0 standard drinks      Wt Readings from Last 1 Encounters:   22 108 kg (238 lb)        Anesthesia Evaluation   Pt has had prior anesthetic. Type: MAC.        ROS/MED HX  ENT/Pulmonary:  - neg pulmonary ROS     Neurologic:  - neg neurologic ROS     Cardiovascular:       METS/Exercise Tolerance:     Hematologic:     (+) History of blood clots, pt is anticoagulated,     Musculoskeletal:       GI/Hepatic:  - neg GI/hepatic ROS     Renal/Genitourinary:     (+) Nephrolithiasis ,     Endo:     (+) Obesity,     Psychiatric/Substance Use:  - neg psychiatric ROS     Infectious Disease:       Malignancy:       Other:            Physical Exam    Airway  airway exam normal           Respiratory Devices and Support         Dental  no notable dental history         Cardiovascular   cardiovascular exam normal          Pulmonary   pulmonary exam normal                OUTSIDE LABS:  CBC:   Lab Results   Component Value Date    WBC 6.9 2022    WBC 7.0 2021    HGB 14.9 2022    HGB 15.3  11/21/2021    HCT 43.8 06/06/2022    HCT 43.5 11/21/2021     06/06/2022     11/21/2021     BMP:   Lab Results   Component Value Date     06/06/2022     11/21/2021    POTASSIUM 4.1 06/06/2022    POTASSIUM 4.0 11/21/2021    CHLORIDE 107 06/06/2022    CHLORIDE 107 11/21/2021    CO2 29 06/06/2022    CO2 27 11/21/2021    BUN 12 06/06/2022    BUN 12 11/21/2021    CR 0.81 06/06/2022    CR 0.73 11/21/2021     (H) 06/06/2022    GLC 87 11/21/2021     COAGS: No results found for: PTT, INR, FIBR  POC:   Lab Results   Component Value Date    HCG Negative 07/20/2022     HEPATIC:   Lab Results   Component Value Date    ALBUMIN 3.5 06/06/2022    PROTTOTAL 7.8 06/06/2022    ALT 46 06/06/2022    AST 27 06/06/2022    ALKPHOS 59 06/06/2022    BILITOTAL 0.4 06/06/2022     OTHER:   Lab Results   Component Value Date    BROOKS 9.3 06/06/2022    TSH 1.71 08/26/2013    CRP <2.9 11/27/2017    SED 15 11/27/2017       Anesthesia Plan    ASA Status:  3   NPO Status:  NPO Appropriate    Anesthesia Type: General.     - Airway: LMA   Induction: Intravenous.   Maintenance: TIVA.        Consents    Anesthesia Plan(s) and associated risks, benefits, and realistic alternatives discussed. Questions answered and patient/representative(s) expressed understanding.     - Discussed: Risks, Benefits and Alternatives for BOTH SEDATION and the PROCEDURE were discussed     - Discussed with:  Patient         Postoperative Care    Pain management: Oral pain medications.   PONV prophylaxis: Ondansetron (or other 5HT-3), Dexamethasone or Solumedrol     Comments:                Tawanda Hansen MD, MD

## 2022-07-25 LAB
APPEARANCE STONE: NORMAL
COMPN STONE: NORMAL
SPECIMEN WT: 19 MG

## 2022-07-26 LAB — BACTERIA SPEC CULT: ABNORMAL

## 2022-07-29 ENCOUNTER — PRE VISIT (OUTPATIENT)
Dept: UROLOGY | Facility: CLINIC | Age: 47
End: 2022-07-29

## 2022-08-23 ENCOUNTER — ANCILLARY PROCEDURE (OUTPATIENT)
Dept: GENERAL RADIOLOGY | Facility: CLINIC | Age: 47
End: 2022-08-23
Attending: UROLOGY
Payer: COMMERCIAL

## 2022-08-23 ENCOUNTER — ANCILLARY PROCEDURE (OUTPATIENT)
Dept: ULTRASOUND IMAGING | Facility: CLINIC | Age: 47
End: 2022-08-23
Attending: UROLOGY
Payer: COMMERCIAL

## 2022-08-23 DIAGNOSIS — N20.1 URETERAL STONE: ICD-10-CM

## 2022-08-23 DIAGNOSIS — N20.1 OBSTRUCTION OF LEFT URETEROPELVIC JUNCTION DUE TO STONE: ICD-10-CM

## 2022-08-23 PROCEDURE — 74018 RADEX ABDOMEN 1 VIEW: CPT | Mod: TC | Performed by: RADIOLOGY

## 2022-08-23 PROCEDURE — 76770 US EXAM ABDO BACK WALL COMP: CPT | Mod: TC | Performed by: RADIOLOGY

## 2022-08-30 ENCOUNTER — VIRTUAL VISIT (OUTPATIENT)
Dept: UROLOGY | Facility: CLINIC | Age: 47
End: 2022-08-30
Payer: COMMERCIAL

## 2022-08-30 DIAGNOSIS — N20.0 KIDNEY STONE: Primary | ICD-10-CM

## 2022-08-30 PROCEDURE — 99213 OFFICE O/P EST LOW 20 MIN: CPT | Mod: 95 | Performed by: NURSE PRACTITIONER

## 2022-08-30 NOTE — NURSING NOTE
Chief Complaint   Patient presents with     Surgical Followup     Post op/kidney stones     Nhung Moser, [facility]

## 2022-08-30 NOTE — LETTER
2022       RE: Jesica Mooney  [address] [facility] [address]     Dear Colleague,    Thank you for referring your patient, Jesica Mooney, to the [facility] [place] at [facility]. Please see a copy of my visit note below.    Gretchen is a 47 year old who is being evaluated via a billable video visit.      How would you like to obtain your AVS? MyChart  If the video visit is dropped, the invitation should be resent by: Text to cell phone: [phone]  Will anyone else be joining your video visit? No    Video-Visit Details    Video Start Time: 7:55 AM    Type of service:  Video Visit    Video End Time: 8:20 AM    Originating Location (pt. Location): Home    Distant Location (provider location):  [facility] [place]     Platform used for Video Visit: Stefano       Jesica Mooney complains of   Chief Complaint   Patient presents with     Surgical Followup     Post op/kidney stones       Assessment/Plan:  47 year old female s/p URS for removal of an 8 mm left UPJ stone. Has done well post-op. Possible tiny residual stone in her left kidney, likely debris. We reviewed general recommendations to prevent kidney stones including the followin) Low oxalate diet. We discussed foods that are particularly high in oxalate including spinach, rhubarb, beets, nuts, nut butters, and black teas.     2) Low salt diet. Discussed common foods with high amounts of salt as well as dietary strategies to minimize sodium.     3) High fluid intake. Recommend 3 liters of fluid daily to produce goal of 2.5L of urine.     4) Modest animal protein. Recommend limiting animal protein to one serving or less daily.     5) Normal dietary calcium.    We discussed Litholink today in brief, but as this was her first stone and there appears to be clear dietary measures to focus on, will defer this for now.     -She will follow  up with me in 6 months with a CT A/P beforehand to get a new baseline stone burden. If the tiny stone in her left kidney has increased in size, or new stones have formed, will pursue Litholink at that time.       Indu Farfan, CNP  Department of Urology      Subjective:   47 year old female with a history of PE on Xarelto who developed gross hematuria and was found to have an obstructing 8 x 6 x 7 mm stone at her left UPJ, along with a tiny nonobstructing stone in her right kidney. She is now s/p left-sided URS/HLL with Dr. Roper on 7/20/22. Stone composed of 10% CaOx and 90% CaP. Follow up imaging showed a possible small stone in her left kidney with no hydronephrosis.     She has no personal or known family history of stones. She follows a general diet with no restrictions. She acknowledges that she does not drink enough water daily, and probably eats too much salt. She eats meat fairly regularly but does have some meatless meals per week.       Objective:     Exam:   Patient is a 47 year old female   No vital signs obtained due to virtual visit.  General Appearance: Well groomed, hygenic  Respiratory: No cough, no respiratory distress or labored breathing  Musculoskeletal: Grossly normal with no gross deficits  Skin: No discoloration or apparent rashes  Neurologic: No tremors  Psychiatric: Alert and oriented  Further examination is deferred due to the nature of our visit.

## 2022-08-30 NOTE — PROGRESS NOTES
Gretchen is a 47 year old who is being evaluated via a billable video visit.      How would you like to obtain your AVS? MyChart  If the video visit is dropped, the invitation should be resent by: Text to cell phone: [phone]  Will anyone else be joining your video visit? No    Video-Visit Details    Video Start Time: 7:55 AM    Type of service:  Video Visit    Video End Time: 8:20 AM    Originating Location (pt. Location): Home    Distant Location (provider location):  [facility] [place]     Platform used for Video Visit: LesterAlejo       Jesica Mooney complains of   Chief Complaint   Patient presents with     Surgical Followup     Post op/kidney stones       Assessment/Plan:  47 year old female s/p URS for removal of an 8 mm left UPJ stone. Has done well post-op. Possible tiny residual stone in her left kidney, likely debris. We reviewed general recommendations to prevent kidney stones including the followin) Low oxalate diet. We discussed foods that are particularly high in oxalate including spinach, rhubarb, beets, nuts, nut butters, and black teas.     2) Low salt diet. Discussed common foods with high amounts of salt as well as dietary strategies to minimize sodium.     3) High fluid intake. Recommend 3 liters of fluid daily to produce goal of 2.5L of urine.     4) Modest animal protein. Recommend limiting animal protein to one serving or less daily.     5) Normal dietary calcium.    We discussed Litholink today in brief, but as this was her first stone and there appears to be clear dietary measures to focus on, will defer this for now.     -She will follow up with me in 6 months with a CT A/P beforehand to get a new baseline stone burden. If the tiny stone in her left kidney has increased in size, or new stones have formed, will pursue Litholink at that time.       Indu Farfan, CNP  Department of Urology      Subjective:   47 year old female with a history of PE on  Xarelto who developed gross hematuria and was found to have an obstructing 8 x 6 x 7 mm stone at her left UPJ, along with a tiny nonobstructing stone in her right kidney. She is now s/p left-sided URS/HLL with Dr. Roper on 7/20/22. Stone composed of 10% CaOx and 90% CaP. Follow up imaging showed a possible small stone in her left kidney with no hydronephrosis.     She has no personal or known family history of stones. She follows a general diet with no restrictions. She acknowledges that she does not drink enough water daily, and probably eats too much salt. She eats meat fairly regularly but does have some meatless meals per week.       Objective:     Exam:   Patient is a 47 year old female   No vital signs obtained due to virtual visit.  General Appearance: Well groomed, hygenic  Respiratory: No cough, no respiratory distress or labored breathing  Musculoskeletal: Grossly normal with no gross deficits  Skin: No discoloration or apparent rashes  Neurologic: No tremors  Psychiatric: Alert and oriented  Further examination is deferred due to the nature of our visit.

## 2022-08-30 NOTE — PATIENT INSTRUCTIONS
UROLOGY CLINIC VISIT PATIENT INSTRUCTIONS    -We will obtain a CT scan in 6 months to reevaluate your stone burden. To schedule this scan, please call [phone].  -Follow up with me at that time via virtual visit to review the scan results.      If you have any issues, questions or concerns in the meantime, do not hesitate to contact us at [phone] or via [employer].     Indu Farfan, CNP  Department of Urology        Calcium Stone Prevention Self Management     Drink more fluids:     Drinking more liquids is the best way you can help prevent future stones. Stones can form when substances in the urine are too concentrated. The more you drink, the more urine you will make. This means all substances in the urine will be less concentrated.     How much urine should I be producing?     The usual recommended daily urine production is about 2 to 3 quarts (5597-6282 ml). If you are producing more than 3 quarts of urine on a regular basis, it is possible to deplete important minerals stored in the body.     To measure the amount of urine you produce in a day, you can either:  Collect all urine in a container and measure at the end of the day   Use a measuring cup each time you urinate and add up the amounts at the end of the day      Observe  Color - Dark miranda urine is concentrated. Light straw color or lighter is dilute and desirable   Odor - Concentrated urine tends to smell stronger. Dilute urine is nearly odorless     Ways to increase your fluid intake     Increasing the amount of fluid you drink is effective for all types of kidney stones. While water is commonly recommended, all fluids are effective for increasing the amount of urine your body produces.  Focus on starting a lifelong habit, rather than a short-term solution.   Keep liquids on hand that you like. Crystal Light is a low calorie appropriate choice.  Drink out of larger glasses. You'll tend to drink more with each serving.   Have an additional  glass of fluid with each meal.   Keep a water or drink bottle at work and fill it regularly.      *If you are prone to fluid retention, consult your doctor before making changes to your fluid habits.     Low Oxalate Diet:     Avoid excess amounts or daily consumption of these foods:  All nuts and nut products including peanuts, almonds, pecans, peanut butter, almond milk  Rhubarb  Chocolate  Soybeans and soy products   Spinach  Wheat Germ  Beets     Maintain a normal calcium diet:     Researches have found that people with low calcium intakes tend to have more stones. Foods with high calcium content are acceptable and include:  Dairy products (including milk, cheese and yogurt)  Meat and fish  Enriched cereals  Dark green vegetables     What about calcium supplements?      Many people take calcium supplements, either on their own or as prescribed by a doctor. Research has indicated that calcium supplements do not usually pose a risk for stone formation.  Calcium citrate is a better choice for a supplement.     Avoid excess salt:     Salt (sodium chloride) is found in abundance in many foods. High sodium levels in the urine can interfere with the kidney's handling of calcium.      Tips for reducing the salt in your diet:  Don't use salt at the table  Reduce the salt used in food preparation. Try 1/2 teaspoon when recipes call for 1 teaspoon.  Use herbs and spices for flavoring instead of salt.  Avoid salty foods.  Check the label before you buy or use a product. Note sodium and portion size information.  Try to consume less than 2,000 mg/day. (1 teaspoon = 2,000 mg)     Foods with high sodium content include:  Processed meat (including luncheon meats, sausage)   Crackers   Instant cereal   Processed cheese   Canned soups   Chips and snack foods   Soy sauce

## 2022-08-31 ENCOUNTER — OFFICE VISIT (OUTPATIENT)
Dept: OPTOMETRY | Facility: CLINIC | Age: 47
End: 2022-08-31
Payer: COMMERCIAL

## 2022-08-31 DIAGNOSIS — H52.13 MYOPIA OF BOTH EYES: Primary | ICD-10-CM

## 2022-08-31 DIAGNOSIS — H52.4 PRESBYOPIA: ICD-10-CM

## 2022-08-31 DIAGNOSIS — H52.223 REGULAR ASTIGMATISM OF BOTH EYES: ICD-10-CM

## 2022-08-31 PROCEDURE — 99207 PR NO CHARGE LOS: CPT | Performed by: OPTOMETRIST

## 2022-08-31 ASSESSMENT — REFRACTION_CURRENTRX
OD_SPHERE: -8.50
OD_CYLINDER: -0.75
OS_CYLINDER: -0.75
OD_BRAND: COOPER BIOFINITY TORIC BC 8.7, D 14.5
OS_BRAND: COOPER BIOFINITY TORIC BC 8.7, D 14.5
OD_AXIS: 150
OS_SPHERE: -8.00
OS_AXIS: 180

## 2022-08-31 ASSESSMENT — REFRACTION_WEARINGRX
OS_SPHERE: +2.50
SPECS_TYPE: OTC'S
OD_SPHERE: +2.50

## 2022-08-31 ASSESSMENT — VISUAL ACUITY
OD_CC: 20/30-1
OS_CC: 20/200
OD_CC+: -2
OS_CC+: -1
METHOD: SNELLEN - LINEAR
OS_CC: 20/30
OD_CC: 20/40
OD_CC: 20/400
OS_CC: 20/25
CORRECTION_TYPE: GLASSES, CONTACTS
CORRECTION_TYPE: CONTACTS

## 2022-08-31 NOTE — PATIENT INSTRUCTIONS
Use +1.50 readers with contact lenses     Contact lenses prescription released to patient today.  Return to clinic 1 year for eye exam.    Minnie Akers, OD  [phone]

## 2022-08-31 NOTE — PROGRESS NOTES
"Chief Complaint   Patient presents with     Contact Lens Check     Satisfied with contacts:  Yes, working well, hasn't actually worn them \"out\", still getting used to them at home   Good comfort:  They are fine for short periods of time. Anything longer than 3 hours  they start to get dry and uncomfortable   Clear vision:     She thinks that it's good, she sees the TV, she was afraid to drive with them. Also using OTC readers over them because she can't read with them. She is wearing a borrowed pair of readers that are +2.50's. She would like to know what strength is \"right\" for her    Liudmila Apple Optometric Assistant           Medical, surgical and family histories reviewed and updated 8/31/2022.       OBJECTIVE: See Ophthalmology exam    ASSESSMENT:    ICD-10-CM    1. Myopia of both eyes  H52.13    2. Regular astigmatism of both eyes  H52.223    3. Presbyopia  H52.4       PLAN:    Patient Instructions   Use +1.50 readers with contact lenses     Contact lenses prescription released to patient today.  Return to clinic 1 year for eye exam.    Minnie Akers, OD  [phone]                                      "

## 2022-08-31 NOTE — LETTER
"    8/31/2022         RE: Jesica Mooney  [address] [address]        Dear Colleague,    Thank you for referring your patient, Jesica Mooney, to the [facility]. Please see a copy of my visit note below.    Chief Complaint   Patient presents with     Contact Lens Check     Satisfied with contacts:  Yes, working well, hasn't actually worn them \"out\", still getting used to them at home   Good comfort:  They are fine for short periods of time. Anything longer than 3 hours  they start to get dry and uncomfortable   Clear vision:     She thinks that it's good, she sees the TV, she was afraid to drive with them. Also using OTC readers over them because she can't read with them. She is wearing a borrowed pair of readers that are +2.50's. She would like to know what strength is \"right\" for her    Liudmila Apple Optometric Assistant           Medical, surgical and family histories reviewed and updated 8/31/2022.       OBJECTIVE: See Ophthalmology exam    ASSESSMENT:    ICD-10-CM    1. Myopia of both eyes  H52.13    2. Regular astigmatism of both eyes  H52.223    3. Presbyopia  H52.4       PLAN:    Patient Instructions   Use +1.50 readers with contact lenses     Contact lenses prescription released to patient today.  Return to clinic 1 year for eye exam.    Minnie Akers, OD  [phone]                                          Again, thank you for allowing me to participate in the care of your patient.        Sincerely,        Minnie Akers, OD    "

## 2022-09-03 ENCOUNTER — HEALTH MAINTENANCE LETTER (OUTPATIENT)
Age: 47
End: 2022-09-03

## 2022-10-16 NOTE — PROGRESS NOTES
Chief Complaint - left parotid cyst    History of Present Illness - Jesica Mooney is a 47 year old female who returns with a left parotid cyst. It has been present since 11/2020. She had one episode of painful parotid or cheek swelling about 10 years ago too. She has a h/o Sjogren's, diagnosis 6/2016, but she has had symptoms prior to that. CT neck 12/10/2020 images reviewed showing a left parotid cystic mass measuring approximately 2.7 x 2.4 x 2.5 cm. No lymphadenopathy. I performed a core needle biopsy on this (12/2020). It was negative and she had a lot of fluid that poured out at the time of biopsy.  I carefully reviewed her CT neck and her ultrasound images.  This information in combination with a very thin copious fluid collection on core needle biopsy leads me to believe that this is a simple cyst, a lymphoepithelial cyst, or sialocele.  She has Sjogren's and the anatomy of her parotid gland on the CT suggests Sjogren's with a cyst.  I see no adjacent nodule or mass suggesting malignancy or other tumor. The cyst enlarges intermittently, and I have had to drain this about every 6 months. I drained this 7/2021, 12/2021, and 6/9/22.    She returns and notes slowly worsening left parotid swelling at her cyst site. No infection or pain. No redness. No numbness. No other lumps or bumps. no facial weakness. She would like the cyst aspirated again. She is returning sooner this time, 3 months instead of 6.    Past Medical History -   Patient Active Problem List   Diagnosis     BMI 40.0-44.9, adult (H)     CARDIOVASCULAR SCREENING; LDL GOAL LESS THAN 160     Other acute pulmonary embolism     Sjogren's syndrome (H)     Screening for cervical cancer     Obstruction of left ureteropelvic junction due to stone       Current Medications -   Current Outpatient Medications:      cevimeline (EVOXAC) 30 MG capsule, Take 1 capsule (30 mg) by mouth daily, Disp: 90 capsule, Rfl: 3     levonorgestrel (MIRENA) 20 MCG/DAY IUD,  1 each by Intrauterine route once, Disp: , Rfl:      polyethylene glycol-propylene glycol (SYSTANE ULTRA) 0.4-0.3 % SOLN ophthalmic solution, Place 1 drop into both eyes 2 times daily as needed for dry eyes, Disp: , Rfl:      rivaroxaban ANTICOAGULANT (XARELTO ANTICOAGULANT) 20 MG TABS tablet, TAKE 1 TABLET(20 MG) BY MOUTH DAILY WITH DINNER, Disp: 30 tablet, Rfl: 11    Allergies -   Allergies   Allergen Reactions     Keflex [Cephalexin Hcl] Hives     Penicillins Hives     Betadine [Povidone Iodine] Itching     Possibly itchiness, no hives, uncertain allergic reaction.       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   Lifestyle     Physical activity     Days per week: Not on file     Minutes per session: Not on file     Stress: Not on file   Relationships     Social connections     Talks on phone: Not on file     Gets together: Not on file     Attends Worship service: Not on file     Active member of club or organization: Not on file     Attends meetings of clubs or organizations: Not on file     Relationship status: Not on file     Intimate partner violence     Fear of current or ex partner: Not on file     Emotionally abused: Not on file      Physically abused: Not on file     Forced sexual activity: Not on file   Other Topics Concern     Parent/sibling w/ CABG, MI or angioplasty before 65F 55M? No      Service Yes     Blood Transfusions No     Caffeine Concern No     Occupational Exposure No     Hobby Hazards No     Sleep Concern No     Stress Concern No     Weight Concern No     Special Diet No     Back Care No     Exercise Yes     Bike Helmet No     Seat Belt Yes     Self-Exams Yes   Social History Narrative     Not on file       Family History -   Family History   Problem Relation Age of Onset     Eye Disorder Mother         cataracts      Rheumatoid Arthritis Mother      Cancer - colorectal Paternal Grandmother         ?     Thyroid Disease Father      Breast Cancer Maternal Aunt         diagnosed in 30s (mother's 1/2 sister)     Cancer No family hx of      Diabetes No family hx of      Hypertension No family hx of      Cerebrovascular Disease No family hx of      Macular Degeneration No family hx of      Glaucoma No family hx of        Physical Exam  General - The patient is in no distress.  Alert, answers questions and cooperates with examination appropriately.   Voice and Breathing - The patient was breathing comfortably without the use of accessory muscles. There was no wheezing, stridor, or stertor.  The patients voice was clear and strong.  Head and Face - Normocephalic and atraumatic.    Neck - The left parotid gland has a 2 cm cyst, somewhat firm of the superficial gland just anterior to the tragus and deep to the lobule. No tenderness. The rest of the occipital, submental, submandibular, internal jugular chain, and supraclavicular nodes did not demonstrate any abnormal lymph nodes or masses.     PROCEDURE - I explained the risks of needle aspiration including infection, bleeding, recurrence, the need for future procedures including removal. She agreed and wished to proceed. I cleansed the skin with alcohol (she has a betadine  connor). I injected 1% lidocaine, 1:100,000 epinephrine (1 ml) into the soft tissue just anterior to the left ear lobule. I then used a 16 gauge needle and aspirated 10 ml of turbid cyst fluid. Minimal bleeding. Bacitracin and a bandaid was placed.     A/P - Jesicaaustin Mooney is a 47 year old female with a left parotid cyst. She has Sjogren's disease. I took a core needle biopsy 12/2020 and drained cyst fluid. I then drained it again 7/2021, 12/2021, 6/2022 and today. She was doing well until recently she had worsening swelling at the site. It has filled to about 2 cm. I drained this again today at her request (10 ml). Recheck in 3 months, sooner if this gets infected, enlarges faster, other lumps or masses appear. We may want to consider re-imaging following drainage next visit to look for any mass associated with this.     Cirilo Hernandez MD  Otolaryngology  [facility]

## 2022-10-18 ENCOUNTER — OFFICE VISIT (OUTPATIENT)
Dept: OTOLARYNGOLOGY | Facility: CLINIC | Age: 47
End: 2022-10-18
Payer: COMMERCIAL

## 2022-10-18 VITALS
SYSTOLIC BLOOD PRESSURE: 108 MMHG | BODY MASS INDEX: 41.81 KG/M2 | WEIGHT: 236 LBS | DIASTOLIC BLOOD PRESSURE: 77 MMHG | HEART RATE: 106 BPM

## 2022-10-18 DIAGNOSIS — K11.6 PAROTID CYST: Primary | ICD-10-CM

## 2022-10-18 PROCEDURE — 10160 PNXR ASPIR ABSC HMTMA BULLA: CPT | Performed by: OTOLARYNGOLOGY

## 2022-10-18 PROCEDURE — 99212 OFFICE O/P EST SF 10 MIN: CPT | Mod: 25 | Performed by: OTOLARYNGOLOGY

## 2022-10-18 NOTE — LETTER
10/18/2022         RE: Jesica Mooney  [address] [address]        Dear Colleague,    Thank you for referring your patient, Jesica Mooney, to the [facility]. Please see a copy of my visit note below.    Chief Complaint - left parotid cyst    History of Present Illness - Jesica Mooney is a 47 year old female who returns with a left parotid cyst. It has been present since 11/2020. She had one episode of painful parotid or cheek swelling about 10 years ago too. She has a h/o Sjogren's, diagnosis 6/2016, but she has had symptoms prior to that. CT neck 12/10/2020 images reviewed showing a left parotid cystic mass measuring approximately 2.7 x 2.4 x 2.5 cm. No lymphadenopathy. I performed a core needle biopsy on this (12/2020). It was negative and she had a lot of fluid that poured out at the time of biopsy.  I carefully reviewed her CT neck and her ultrasound images.  This information in combination with a very thin copious fluid collection on core needle biopsy leads me to believe that this is a simple cyst, a lymphoepithelial cyst, or sialocele.  She has Sjogren's and the anatomy of her parotid gland on the CT suggests Sjogren's with a cyst.  I see no adjacent nodule or mass suggesting malignancy or other tumor. The cyst enlarges intermittently, and I have had to drain this about every 6 months. I drained this 7/2021, 12/2021, and 6/9/22.    She returns and notes slowly worsening left parotid swelling at her cyst site. No infection or pain. No redness. No numbness. No other lumps or bumps. no facial weakness. She would like the cyst aspirated again. She is returning sooner this time, 3 months instead of 6.    Past Medical History -   Patient Active Problem List   Diagnosis     BMI 40.0-44.9, adult (H)     CARDIOVASCULAR SCREENING; LDL GOAL LESS THAN 160     Other acute pulmonary embolism     Sjogren's syndrome (H)     Screening for cervical cancer      Obstruction of left ureteropelvic junction due to stone       Current Medications -   Current Outpatient Medications:      cevimeline (EVOXAC) 30 MG capsule, Take 1 capsule (30 mg) by mouth daily, Disp: 90 capsule, Rfl: 3     levonorgestrel (MIRENA) 20 MCG/DAY IUD, 1 each by Intrauterine route once, Disp: , Rfl:      polyethylene glycol-propylene glycol (SYSTANE ULTRA) 0.4-0.3 % SOLN ophthalmic solution, Place 1 drop into both eyes 2 times daily as needed for dry eyes, Disp: , Rfl:      rivaroxaban ANTICOAGULANT (XARELTO ANTICOAGULANT) 20 MG TABS tablet, TAKE 1 TABLET(20 MG) BY MOUTH DAILY WITH DINNER, Disp: 30 tablet, Rfl: 11    Allergies -   Allergies   Allergen Reactions     Keflex [Cephalexin Hcl] Hives     Penicillins Hives     Betadine [Povidone Iodine] Itching     Possibly itchiness, no hives, uncertain allergic reaction.       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   Lifestyle     Physical activity     Days per week: Not on file     Minutes per session: Not on file     Stress: Not on file   Relationships     Social connections     Talks on phone: Not on file     Gets together: Not on file     Attends [employer]ism service: Not on file      Active member of club or organization: Not on file     Attends meetings of clubs or organizations: Not on file     Relationship status: Not on file     Intimate partner violence     Fear of current or ex partner: Not on file     Emotionally abused: Not on file     Physically abused: Not on file     Forced sexual activity: Not on file   Other Topics Concern     Parent/sibling w/ CABG, MI or angioplasty before 65F 55M? No      Service Yes     Blood Transfusions No     Caffeine Concern No     Occupational Exposure No     Hobby Hazards No     Sleep Concern No     Stress Concern No     Weight Concern No     Special Diet No     Back Care No     Exercise Yes     Bike Helmet No     Seat Belt Yes     Self-Exams Yes   Social History Narrative     Not on file       Family History -   Family History   Problem Relation Age of Onset     Eye Disorder Mother         cataracts      Rheumatoid Arthritis Mother      Cancer - colorectal Paternal Grandmother         ?     Thyroid Disease Father      Breast Cancer Maternal Aunt         diagnosed in 30s (mother's 1/2 sister)     Cancer No family hx of      Diabetes No family hx of      Hypertension No family hx of      Cerebrovascular Disease No family hx of      Macular Degeneration No family hx of      Glaucoma No family hx of        Physical Exam  General - The patient is in no distress.  Alert, answers questions and cooperates with examination appropriately.   Voice and Breathing - The patient was breathing comfortably without the use of accessory muscles. There was no wheezing, stridor, or stertor.  The patients voice was clear and strong.  Head and Face - Normocephalic and atraumatic.    Neck - The left parotid gland has a 2 cm cyst, somewhat firm of the superficial gland just anterior to the tragus and deep to the lobule. No tenderness. The rest of the occipital, submental, submandibular, internal jugular chain, and supraclavicular nodes did not demonstrate any abnormal  lymph nodes or masses.     PROCEDURE - I explained the risks of needle aspiration including infection, bleeding, recurrence, the need for future procedures including removal. She agreed and wished to proceed. I cleansed the skin with alcohol (she has a betadine allergey). I injected 1% lidocaine, 1:100,000 epinephrine (1 ml) into the soft tissue just anterior to the left ear lobule. I then used a 16 gauge needle and aspirated 10 ml of turbid cyst fluid. Minimal bleeding. Bacitracin and a bandaid was placed.     A/P - Jesicaaustin Mooney is a 47 year old female with a left parotid cyst. She has Sjogren's disease. I took a core needle biopsy 12/2020 and drained cyst fluid. I then drained it again 7/2021, 12/2021, 6/2022 and today. She was doing well until recently she had worsening swelling at the site. It has filled to about 2 cm. I drained this again today at her request (10 ml). Recheck in 3 months, sooner if this gets infected, enlarges faster, other lumps or masses appear. We may want to consider re-imaging following drainage next visit to look for any mass associated with this.     Cirilo Hernandez MD  Otolaryngology  [facility]        Again, thank you for allowing me to participate in the care of your patient.        Sincerely,        Cirilo Hernandez MD

## 2022-11-05 ENCOUNTER — LAB (OUTPATIENT)
Dept: LAB | Facility: CLINIC | Age: 47
End: 2022-11-05
Payer: COMMERCIAL

## 2022-11-05 DIAGNOSIS — M35.01 SJOGREN'S SYNDROME WITH KERATOCONJUNCTIVITIS SICCA (H): ICD-10-CM

## 2022-11-05 LAB
ALBUMIN MFR UR ELPH: 6.1 MG/DL
ALBUMIN UR-MCNC: NEGATIVE MG/DL
APPEARANCE UR: CLEAR
BACTERIA #/AREA URNS HPF: ABNORMAL /HPF
BASOPHILS # BLD AUTO: 0.1 10E3/UL (ref 0–0.2)
BASOPHILS NFR BLD AUTO: 1 %
BILIRUB UR QL STRIP: NEGATIVE
COLOR UR AUTO: YELLOW
CREAT UR-MCNC: 48 MG/DL
EOSINOPHIL # BLD AUTO: 0.5 10E3/UL (ref 0–0.7)
EOSINOPHIL NFR BLD AUTO: 6 %
ERYTHROCYTE [DISTWIDTH] IN BLOOD BY AUTOMATED COUNT: 12.8 % (ref 10–15)
GLUCOSE UR STRIP-MCNC: NEGATIVE MG/DL
HCT VFR BLD AUTO: 44.3 % (ref 35–47)
HGB BLD-MCNC: 15.1 G/DL (ref 11.7–15.7)
HGB UR QL STRIP: ABNORMAL
KETONES UR STRIP-MCNC: NEGATIVE MG/DL
LEUKOCYTE ESTERASE UR QL STRIP: NEGATIVE
LYMPHOCYTES # BLD AUTO: 2.7 10E3/UL (ref 0.8–5.3)
LYMPHOCYTES NFR BLD AUTO: 36 %
MCH RBC QN AUTO: 30.7 PG (ref 26.5–33)
MCHC RBC AUTO-ENTMCNC: 34.1 G/DL (ref 31.5–36.5)
MCV RBC AUTO: 90 FL (ref 78–100)
MONOCYTES # BLD AUTO: 0.6 10E3/UL (ref 0–1.3)
MONOCYTES NFR BLD AUTO: 8 %
NEUTROPHILS # BLD AUTO: 3.6 10E3/UL (ref 1.6–8.3)
NEUTROPHILS NFR BLD AUTO: 49 %
NITRATE UR QL: NEGATIVE
PH UR STRIP: 6.5 [PH] (ref 5–7)
PLATELET # BLD AUTO: 280 10E3/UL (ref 150–450)
PROT/CREAT 24H UR: 0.13 MG/MG CR (ref 0–0.2)
RBC # BLD AUTO: 4.92 10E6/UL (ref 3.8–5.2)
RBC #/AREA URNS AUTO: ABNORMAL /HPF
SP GR UR STRIP: <=1.005 (ref 1–1.03)
SQUAMOUS #/AREA URNS AUTO: ABNORMAL /LPF
TOTAL PROTEIN SERUM FOR ELP: 7 G/DL (ref 6.4–8.3)
UROBILINOGEN UR STRIP-ACNC: 0.2 E.U./DL
WBC # BLD AUTO: 7.4 10E3/UL (ref 4–11)
WBC #/AREA URNS AUTO: ABNORMAL /HPF

## 2022-11-05 PROCEDURE — 84155 ASSAY OF PROTEIN SERUM: CPT

## 2022-11-05 PROCEDURE — 84165 PROTEIN E-PHORESIS SERUM: CPT

## 2022-11-05 PROCEDURE — 84156 ASSAY OF PROTEIN URINE: CPT

## 2022-11-05 PROCEDURE — 85025 COMPLETE CBC W/AUTO DIFF WBC: CPT

## 2022-11-05 PROCEDURE — 36415 COLL VENOUS BLD VENIPUNCTURE: CPT

## 2022-11-05 PROCEDURE — 81001 URINALYSIS AUTO W/SCOPE: CPT

## 2022-11-05 PROCEDURE — 80053 COMPREHEN METABOLIC PANEL: CPT

## 2022-11-07 LAB
ALBUMIN SERPL ELPH-MCNC: 3.8 G/DL (ref 3.7–5.1)
ALBUMIN SERPL-MCNC: 3.6 G/DL (ref 3.4–5)
ALP SERPL-CCNC: 61 U/L (ref 40–150)
ALPHA1 GLOB SERPL ELPH-MCNC: 0.2 G/DL (ref 0.2–0.4)
ALPHA2 GLOB SERPL ELPH-MCNC: 0.6 G/DL (ref 0.5–0.9)
ALT SERPL W P-5'-P-CCNC: 45 U/L (ref 0–50)
ANION GAP SERPL CALCULATED.3IONS-SCNC: 4 MMOL/L (ref 3–14)
AST SERPL W P-5'-P-CCNC: 27 U/L (ref 0–45)
B-GLOBULIN SERPL ELPH-MCNC: 0.8 G/DL (ref 0.6–1)
BILIRUB SERPL-MCNC: 0.8 MG/DL (ref 0.2–1.3)
BUN SERPL-MCNC: 11 MG/DL (ref 7–30)
CALCIUM SERPL-MCNC: 9.1 MG/DL (ref 8.5–10.1)
CHLORIDE BLD-SCNC: 109 MMOL/L (ref 94–109)
CO2 SERPL-SCNC: 28 MMOL/L (ref 20–32)
CREAT SERPL-MCNC: 0.75 MG/DL (ref 0.52–1.04)
GAMMA GLOB SERPL ELPH-MCNC: 1.6 G/DL (ref 0.7–1.6)
GFR SERPL CREATININE-BSD FRML MDRD: >90 ML/MIN/1.73M2
GLUCOSE BLD-MCNC: 90 MG/DL (ref 70–99)
M PROTEIN SERPL ELPH-MCNC: 0 G/DL
POTASSIUM BLD-SCNC: 4.2 MMOL/L (ref 3.4–5.3)
PROT PATTERN SERPL ELPH-IMP: NORMAL
PROT SERPL-MCNC: 7.6 G/DL (ref 6.8–8.8)
SODIUM SERPL-SCNC: 141 MMOL/L (ref 133–144)

## 2022-11-09 ENCOUNTER — ANCILLARY PROCEDURE (OUTPATIENT)
Dept: MAMMOGRAPHY | Facility: CLINIC | Age: 47
End: 2022-11-09
Attending: NURSE PRACTITIONER
Payer: COMMERCIAL

## 2022-11-09 DIAGNOSIS — Z12.31 VISIT FOR SCREENING MAMMOGRAM: ICD-10-CM

## 2022-11-09 PROCEDURE — 77067 SCR MAMMO BI INCL CAD: CPT | Mod: TC | Performed by: STUDENT IN AN ORGANIZED HEALTH CARE EDUCATION/TRAINING PROGRAM

## 2022-11-14 ENCOUNTER — OFFICE VISIT (OUTPATIENT)
Dept: RHEUMATOLOGY | Facility: CLINIC | Age: 47
End: 2022-11-14
Payer: COMMERCIAL

## 2022-11-14 VITALS
OXYGEN SATURATION: 95 % | DIASTOLIC BLOOD PRESSURE: 83 MMHG | SYSTOLIC BLOOD PRESSURE: 116 MMHG | WEIGHT: 234.6 LBS | BODY MASS INDEX: 41.56 KG/M2 | HEART RATE: 72 BPM

## 2022-11-14 DIAGNOSIS — M35.01 SJOGREN'S SYNDROME WITH KERATOCONJUNCTIVITIS SICCA (H): Primary | ICD-10-CM

## 2022-11-14 DIAGNOSIS — M79.672 BILATERAL FOOT PAIN: ICD-10-CM

## 2022-11-14 DIAGNOSIS — M79.671 BILATERAL FOOT PAIN: ICD-10-CM

## 2022-11-14 PROCEDURE — 99214 OFFICE O/P EST MOD 30 MIN: CPT | Performed by: INTERNAL MEDICINE

## 2022-11-14 RX ORDER — CEVIMELINE HYDROCHLORIDE 30 MG/1
30 CAPSULE ORAL 2 TIMES DAILY
Qty: 180 CAPSULE | Refills: 3 | Status: SHIPPED | OUTPATIENT
Start: 2022-11-14 | End: 2023-10-23

## 2022-11-14 NOTE — PATIENT INSTRUCTIONS
RHEUMATOLOGY    Dr. Dylan Donato    [facility]  [address] [place] [address]  [place] [address]  Phone number: [phone]  Fax number: [phone]    You may schedule your FLU shot by calling [phone] or if you would like to get your shot at a [place] pharmacy you may schedule online at [email or web address].    Thank you for choosing [facility]!    Brittanie Rouse CMA Rheumatology

## 2022-11-14 NOTE — PROGRESS NOTES
Rheumatology Clinic Visit      Jesica Mooney MRN# [id number]   YOB: 1975 Age: 47 year old      Date of visit: 11/14/22   Hematology/Oncology: Dr. Flavia Leon and Dr. Ata Rendon  PCP: Brenda Graves    Chief Complaint   Patient presents with:  Sjogren's syndrome with keratoconjunctivitis sicca     Assessment and Plan     1. Sjogren's Syndrome (JAN >1:91536, SSA >8):  She has punctal plugs; restasis was reportedly ineffective when she used a sample from her ophthalmologist.  She continues following with her ophthalmologist.  Pilocarpine was tolerated but provided no benefit so she stopped it with no change in symptoms; now on Evoxac and she finds that once daily is sufficient to control her dry mouth but the mouth is dry today and she agrees that twice daily may be more effective.  Good dentition and follows with a dentist every 6 months.  No recent cavities.  Chronic illness, stable.    - Increase Evoxac from 30mg daily, to 30 mg twice daily  - Labs in 1 year: CBC, CMP, SPEP, ESR, CRP, UA, Uprotein:creatinine    2. Pulmonary Embolism: Diagnosed 12/2015 and is on anticoagulation that is managed in another clinic.  Documented here for historical significance    3.  Left parotid cyst: Following with ENT and has had this drained previously and about once every 6 months; she says that she discussed with Dr. Hernandez (ENT) about having surgery for this and that is an option that she may consider in the future if the cyst continues to recur.    4.  Bilateral dorsal midfoot pain: Degenerative in nature, consistent with OA.  No abnormality on exam.  Some improvement with wearing supportive shoes whenever ambulatory, but still with intermittent symptoms.  If worsening symptoms then advise seeing podiatry.     5.  Vaccinations: Vaccinations reviewed with Ms. Mooney.    - Influenza: up to date  - Lhwiwyh91: up to date  - Eiasmeizx49: up to date  - Shingrix: Up to date  - COVID-19: Up to  date    Total minutes spent in evaluation with patient, documentation, , and review of pertinent studies and chart notes: 18     Thank you for involving me in the care of the patient    Return to clinic: 11-12 months, earlier PRN    HPI   Jesica Mooney is a 47 year old female with history of pulmonary embolism and obesity who presents for follow-up of Sjogren's Syndrome.     Today, 11/14/2022: Left parotid cyst has been drained by ENT approximately once every 6 months; she says that it was drained recently and by now she usually can feel fluid accumulating but does not at this point.  Mild dry mouth that she says is well controlled with Evoxac once daily.  Dry eyes treated with artificial tears.  Following with a dentist at least twice yearly.  Bilateral dorsal midfoot pain with increased ambulation, worse at the end of the day and improved with rest.    Denies fevers, chills, nausea, vomiting, constipation, diarrhea. No abdominal pain. No chest pain/pressure, palpitations, or shortness of breath. No oral or nasal sores. No neck pain. No rash.  No weight loss or night sweats.    Tobacco: None  EtOH: None  Drugs: None    ROS   12 point review of system was completed and negative except as noted in the HPI     Active Problem List     Patient Active Problem List   Diagnosis     BMI 40.0-44.9, adult (H)     CARDIOVASCULAR SCREENING; LDL GOAL LESS THAN 160     Other acute pulmonary embolism     Sjogren's syndrome (H)     Screening for cervical cancer     Obstruction of left ureteropelvic junction due to stone     Past Medical History     Past Medical History:   Diagnosis Date     IUD (intrauterine device) in place     Mirena inserted 12/28/15     Obese      Past Surgical History     Past Surgical History:   Procedure Laterality Date     BIOPSY  a fews years ago    A mole was removed from by scalp     LASER HOLMIUM LITHOTRIPSY URETER(S), INSERT STENT, COMBINED Left 7/20/2022    Procedure: LEFT  CYSTOURETEROSCOPY, WITH LITHOTRIPSY USING LASER AND URETERAL STENT INSERTION;  Surgeon: Jesus Roper MD;  Location: [facility] [place] OR     [facility] ORAL SURGERY PROCEDURE  10/1990    wisdom teeth extracted     Allergy     Allergies   Allergen Reactions     Keflex [Cephalexin Hcl] Hives     Penicillins Hives     Betadine [Povidone Iodine] Itching     Possibly itchiness, no hives, uncertain allergic reaction.     Current Medication List     Current Outpatient Medications   Medication Sig     cevimeline (EVOXAC) 30 MG capsule Take 1 capsule (30 mg) by mouth daily     levonorgestrel (MIRENA) 20 MCG/DAY IUD 1 each by Intrauterine route once     polyethylene glycol-propylene glycol (SYSTANE ULTRA) 0.4-0.3 % SOLN ophthalmic solution Place 1 drop into both eyes 2 times daily as needed for dry eyes     rivaroxaban ANTICOAGULANT (XARELTO ANTICOAGULANT) 20 MG TABS tablet TAKE 1 TABLET(20 MG) BY MOUTH DAILY WITH DINNER     No current facility-administered medications for this visit.         Social History   See HPI    Family History     Family History   Problem Relation Age of Onset     Eye Disorder Mother         cataracts      Rheumatoid Arthritis Mother      Cancer - colorectal Paternal Grandmother         ?     Thyroid Disease Father      Breast Cancer Maternal Aunt         diagnosed in 30s (mother's 1/2 sister)     Cancer No family hx of      Diabetes No family hx of      Hypertension No family hx of      Cerebrovascular Disease No family hx of      Macular Degeneration No family hx of      Glaucoma No family hx of      Mother: RA    Physical Exam     Temp Readings from Last 3 Encounters:   07/20/22 97.6  F (36.4  C) (Temporal)   06/06/22 97.9  F (36.6  C) (Tympanic)   10/20/21 98.4  F (36.9  C) (Tympanic)     BP Readings from Last 5 Encounters:   11/14/22 116/83   10/18/22 108/77   07/20/22 125/85   06/06/22 118/80   12/23/21 119/79     Pulse Readings from Last 1 Encounters:   11/14/22 72     Resp Readings from Last 1 Encounters:  "  07/20/22 16     Estimated body mass index is 41.56 kg/m  as calculated from the following:    Height as of 7/20/22: 1.6 m (5' 3\").    Weight as of this encounter: 106.4 kg (234 lb 9.6 oz).    GEN: NAD.  HEENT:  Anicteric, noninjected sclera. No obvious external lesions of the ear and nose. Hearing intact.  CV: S1, S2. RRR. No m/r/g  PULM: No increased work of breathing. CTA bilaterally   MSK: MCPs, PIPs, DIPs without swelling or tenderness to palpation.  Wrists without swelling or tenderness to palpation.  Elbows and shoulders without swelling or tenderness to palpation.   Knees, ankles, and MTPs without swelling or tenderness to palpation.    SKIN: No rash or jaundice seen  PSYCH: Alert. Appropriate.      Labs / Imaging (select studies)     JAN  Recent Labs   Lab Test 05/24/16  0822 04/14/16  1447 01/12/16  1457   AVA  --  10.9* 11.3*   ANAIGG >1:73790  Reference range: <1:40  (Note)  Mixed Homogeneous and Speckled pattern.  INTERPRETIVE INFORMATION: JAN by IFA, IgG  Anti-nuclear antibodies (JAN) are seen in a variety of  systemic rheumatic diseases and are determined by indirect  fluorescence assay (IFA) using HEp-2 substrate with an  IgG-specific conjugate. JAN titers less than or equal to  1:80 have variable relevance while titers greater than or  equal to 1:160 are considered clinically significant. These  antibodies may precede clinical disease onset; however,  healthy individuals and those with advanced age have been  reported to be positive for JAN. When observed, one of the  five basic patterns is reported: homogeneous,  peripheral/rim, speckled, centromere, or nucleolar. If  cytoplasmic fluorescence is observed, it is noted. IFA  methodology is subjective and has occasionally been shown  to lack sensitivity for anti-SSA/Ro antibodies.  Negative results do not necessarily rule out the presence  of SSc. If clinical nelson spicion remains, consider further  testing for U3-RNP, PM/Scl, or Th/To antibodies " associated  with SSc.  Performed by [facility],  [address] [place] [address] [phone]  [email or web address], Charles Kumar MD, Lab. Director    --   --      RNP/Sm/SSA/SSB  Recent Labs   Lab Test 05/24/16  0822   RNPIGG 0.9   SMIGG <0.2  Negative   Antibody index (AI) values reflect qualitative changes in antibody   concentration that cannot be directly associated with clinical condition or   disease state.     SSAIGG >8.0  Positive   Antibody index (AI) values reflect qualitative changes in antibody   concentration that cannot be directly associated with clinical condition or   disease state.  *   SSBIGG 0.3   SCLIGG <0.2  Negative   Antibody index (AI) values reflect qualitative changes in antibody   concentration that cannot be directly associated with clinical condition or   disease state.       dsDNA  Recent Labs   Lab Test 11/25/16  1512 05/24/16  0822   DNA 5 6     C3/C4  Recent Labs   Lab Test 11/27/17  0846 11/25/16  1512 05/24/16  0822   J8LBVDR 98 98 99   D8AIRBO 21 21 19     Antiphospholipid Antibodies  Recent Labs   Lab Test 04/14/16  1447 01/12/16  1457   B2GPG  --  2.1   B2GPM  --  <0.9  Negative     CARG <15.0  Interpretation:  Negative   <15.0  Interpretation:  Negative     DEEPTI <12.5  Interpretation:  Negative   14.7*   LUPINT  --  Negative  (Note)  COMMENTS:  The INR is normal.  APTT ratio is elevated.  Platelet Neutralization is negative.  APTT 1:2 Mix ratio is normal.  DRVVT Screen ratio is normal.  Thrombin time is normal.  NEGATIVE TEST; A LUPUS ANTICOAGULANT WAS NOT DETECTED IN THIS  SPECIMEN WITHIN THE LIMITS OF THE TESTING REPERTOIRE.  If the clinical picture is strongly suggestive of an antiphospholipid  syndrome, recommend anticardiolipin and beta-2-glycoprotein (IgG and  IgM) antibody tests.  Platelet Neutralization and APTT 1:2 Mix ratio are suggestive of  factor deficiency.  Recommend factors 8, 9, 11 and 12 (factors VIII, IX, XI, and XII)  levels if clinically  indicated.  Nimisha Lovell M.D.  [phone]  1/15/2016    INR =  1.06    Reference range: 0.86-1.14  Thrombin Time= 16.5    Reference range: 13.0-19.0 sec    APTT:       Ratio  Patient  =  1.21  1:2 Mix  =  1.05  Reference:  Negative: Less than or equal to 1.16  Positive: Greater than or equal to 1.17    Platelet Neutralization (second s):  PTT Buffer-PTT Platelet Lysate =  0  Reference:  Negative: Less than or equal to 0  Positive: Greater than or equal to 1     DILUTE BRANDY VIPER VENOM TEST:  Screen Ratio = 1.15   Normal is less than 1.21         CBC  Recent Labs   Lab Test 11/05/22  1026 06/06/22  0822 11/21/21  1016 11/23/20  0853 11/25/19  0837 11/26/18  0830   WBC 7.4 6.9 7.0 7.6 7.0 7.3   RBC 4.92 4.81 4.95 4.88 5.13 4.91   HGB 15.1 14.9 15.3 15.0 15.7 15.1   HCT 44.3 43.8 43.5 43.6 46.9 44.4   MCV 90 91 88 89 91 90   RDW 12.8 13.1 12.8 12.7 12.8 12.6    229 227 254 248 246   MCH 30.7 31.0 30.9 30.7 30.6 30.8   MCHC 34.1 34.0 35.2 34.4 33.5 34.0   NEUTROPHIL 49 51 52 48.2 47.9 44.9   LYMPH 36 29 31 31.7 33.7 39.3   MONOCYTE 8 10 10 12.3 9.7 9.4   EOSINOPHIL 6 9 7 7.4 8.3 6.1   BASOPHIL 1 1 1 0.4 0.4 0.3   ANEU  --   --   --  3.7 3.3 3.3   ALYM  --   --   --  2.4 2.4 2.9   CARLY  --   --   --  0.9 0.7 0.7   AEOS  --   --   --  0.6 0.6 0.4   ABAS  --   --   --  0.0 0.0 0.0   ANEUTAUTO 3.6 3.5 3.7  --   --   --    ALYMPAUTO 2.7 2.0 2.2  --   --   --    AMONOAUTO 0.6 0.7 0.7  --   --   --    AEOSAUTO 0.5 0.6 0.5  --   --   --    ABSBASO 0.1 0.1 0.0  --   --   --      CMP  Recent Labs   Lab Test 11/05/22  1026 06/06/22  0822 11/21/21  1016 11/23/20  0853 11/25/19  0837 11/26/18  0830 11/27/17  0846    139 138 138 137 138 137   POTASSIUM 4.2 4.1 4.0 4.0 4.2 4.1 4.0   CHLORIDE 109 107 107 106 108 106 105   CO2 28 29 27 29 28 27 29   ANIONGAP 4 3 4 3 1* 5 3   GLC 90 108* 87 89 98 96 88   BUN 11 12 12 14 13 14 14   CR 0.75 0.81 0.73 0.84 0.78 0.81 0.90   GFRESTIMATED >90 90 >90 83 >90 77 68    GFRESTBLACK  --   --   --  >90 >90 >90 82   BROOKS 9.1 9.3 9.0 9.0 9.6 9.3 9.2   BILITOTAL 0.8 0.4 0.5 0.6 0.4 0.3 0.4   ALBUMIN 3.6 3.5 3.2* 3.4 3.6 3.6 3.8   PROTTOTAL 7.6 7.8 7.6 7.4 7.9 8.1 8.5   ALKPHOS 61 59 59 69 61 63 65   AST 27 27 26 21 22 25 26   ALT 45 46 47 46 36 35 42     Calcium/VitaminD  Recent Labs   Lab Test 11/05/22  1026 06/06/22  0822 11/21/21  1016   BROOKS 9.1 9.3 9.0     ESR/CRP  Recent Labs   Lab Test 11/27/17  0846 11/25/16  1512 05/24/16  0822   SED 15 23* 17   CRP <2.9 <2.9 <2.9     CK/Aldolase  Recent Labs   Lab Test 11/25/16  1512 05/24/16  0822   CKT 96 72     Hepatitis C  Recent Labs   Lab Test 11/23/20  0853   HCVAB Nonreactive     HIV Screening  Recent Labs   Lab Test 11/23/20  0853   HIAGAB Nonreactive     UA  Recent Labs   Lab Test 11/05/22  1103 06/06/22  0746 11/21/21  1016 11/23/20  0906 11/25/19  0840 11/26/18  0838   COLOR Yellow Brown* Yellow Yellow Yellow Yellow   APPEARANCE Clear Cloudy* Clear Clear Clear Clear   URINEGLC Negative Negative Negative Negative Negative Negative   URINEBILI Negative Negative Negative Negative Negative Negative   SG <=1.005 >=1.030 1.015 1.015 1.015 1.010   URINEPH 6.5 5.5 7.0 6.0 6.5 6.5   PROTEIN Negative 100* Negative Negative Negative Negative   UROBILINOGEN 0.2 0.2 0.2 0.2 0.2 0.2   NITRITE Negative Negative Negative Negative Negative Negative   UBLD Trace* Large* Trace* Small* Small* Trace*   LEUKEST Negative Trace* Trace* Trace* Negative Trace*   WBCU None Seen 0-5 0-5 0 - 5 0 - 5 0 - 5   RBCU 5-10* >100* 0-2 O - 2 O - 2 2-5*   SQUAMOUSEPI  --   --   --  Moderate* Moderate* Few   BACTERIA None Seen Few*  --   --  Few*  --    MUCOUS  --   --   --  Present*  --   --      Urine Microscopic  Recent Labs   Lab Test 11/05/22  1103 06/06/22  0746 11/21/21  1016 11/23/20  0906 11/25/19  0840 11/26/18  0838   WBCU None Seen 0-5 0-5 0 - 5 0 - 5 0 - 5   RBCU 5-10* >100* 0-2 O - 2 O - 2 2-5*   SQUAMOUSEPI  --   --   --  Moderate* Moderate* Few    BACTERIA None Seen Few*  --   --  Few*  --    MUCOUS  --   --   --  Present*  --   --      Urine Protein  GHUTP and UTP= Urine protein (random), GHUTPG and UTPG = urine protein:creatinine ratio (random), UCRR = urine creatinine (random)  Recent Labs   Lab Test 11/05/22  1103 11/21/21  1016 11/23/20  0906 11/25/19  0840 11/26/18  0838   GHUTP 6.1  --   --   --   --    UTP  --  <0.05 <0.05 0.06 <0.05   GHUTPG 0.13  --   --   --   --    UTPG  --   --  Unable to calculate due to low value 0.07 Unable to calculate due to low value   UCRR 48.0 28 98 81 46     Immunization History     Immunization History   Administered Date(s) Administered     COVID-19,PF,Pfizer (12+ Yrs) 03/24/2021, 04/12/2021, 11/26/2021     COVID-19,PF,Pfizer 12+ YRS BIVALENT Booster 10/29/2022     Influenza (IIV3) PF 10/02/2012     Influenza Intranasal Vaccine 4 valent (FluMist) 10/11/2013, 10/16/2014, 10/11/2016     Influenza Vaccine IM > 6 months Valent IIV4 (Alfuria,Fluzone) 10/05/2015, 10/13/2017, 10/15/2018, 09/16/2019, 10/13/2020, 10/20/2021     Pneumo Conj 13-V (2010&after) 03/12/2019     Pneumococcal 23 valent 09/03/2019     TDAP Vaccine (Adacel) 12/03/2018     Tdap (Adacel,Boostrix) 01/17/2008     Twinrix A/B 12/17/2012, 01/16/2013, 06/18/2013     Zoster vaccine recombinant adjuvanted (SHINGRIX) 03/12/2019, 09/03/2019          Chart documentation done in part with Dragon Voice recognition Software. Although reviewed after completion, some word and grammatical error may remain.    Dylan Donato MD

## 2023-01-15 ENCOUNTER — HEALTH MAINTENANCE LETTER (OUTPATIENT)
Age: 48
End: 2023-01-15

## 2023-04-24 ENCOUNTER — VIRTUAL VISIT (OUTPATIENT)
Dept: HEMATOLOGY | Facility: CLINIC | Age: 48
End: 2023-04-24
Attending: PHYSICIAN ASSISTANT
Payer: COMMERCIAL

## 2023-04-24 DIAGNOSIS — Z86.711 HISTORY OF PULMONARY EMBOLISM: ICD-10-CM

## 2023-04-24 DIAGNOSIS — Z79.01 CHRONIC ANTICOAGULATION: ICD-10-CM

## 2023-04-24 DIAGNOSIS — D68.59 ANTITHROMBIN DEFICIENCY (H): Primary | ICD-10-CM

## 2023-04-24 PROCEDURE — 99213 OFFICE O/P EST LOW 20 MIN: CPT | Mod: VID | Performed by: PHYSICIAN ASSISTANT

## 2023-04-24 PROCEDURE — G0463 HOSPITAL OUTPT CLINIC VISIT: HCPCS | Mod: PN,GT | Performed by: PHYSICIAN ASSISTANT

## 2023-04-24 NOTE — PROGRESS NOTES
Patient was contacted to complete the pre-visit call prior to their telephone visit with the provider.     Allergies and medications were reviewed.     I thanked them for their time to cover this information.     Vale Hess CMA

## 2023-04-24 NOTE — PROGRESS NOTES
"    [facility]  [address] [place] [address] [address] [address]  Main: [phone], Fax: [phone]      Video Virtual Visit Note:    Patient: Jesica Mooney  MRN: [id number]  : 1975  OWEN: 2023      Due to the ongoing COVID-19 outbreak, this visit was conducted by video, with the patient's approval.      Reason of today's visit:  Follow up, history of antithrombin deficiency and venous thromboembolism, on long term anticoagulation     Clinical History Summary:  Jesica \"Gretchen\"Vinicio is a 47 year old female with past medical history pertinent for pulmonary embolism while on chronic (>20 years) estrogen containing oral contraceptive pills in 2015. She was started on Xarelto and her OCPs were discontinued. She ultimately ended up undergoing a hypercoagulable workup that showed high-titer positive JAN, persistently elevated D-dimer around 2.5 and persistent mildly reduced levels of antithrombin III (70s). Her lupus inhibitor, anticardiolipin antibodies, beta-2-glycoprotein antibodies, protein C and S levels, factor V Leiden and prothrombin gene mutation testing was negative. She was seen by Rheumatology and diagnosed with Sjogren's. She did have gene sequencing for antithrombin III deficiency and had VUS of SERPINC1 gene likely to be pathogenic in terms of clotting risk thus she was recommended to continue on long term anticoagulation for secondary prevention of venous thromboembolism. She has been maintained on Xarelto 20mg once daily.      Interim History:  Today, Gretchen reports that she is doing well. She denies any bleeding complications nor further venous thromboembolic events. She tolerates the Xarelto well without any concerns. She takes it at dinner with food. She has been using a pill box and denies any frequently missed doses but notes that she is ahead of her refills and cannot understand why. Cost is reasonable to her with copay " card.     She did have a single bout of nephrolithiasis with gross hematuria requiring cystoureteroscopy and lithotripsy and stent placement. She held a dose of Xarelto for 24 hours prior to procedure. She tolerated the procedure well without complication. Stone was felt to be 2/2 dehydration. No recurrence. No other surgeries or procedures scheduled.     ROS:  Denies any bleeding complications. Specifically, no frequent epistaxis. No issues with oral mucosal bleeding. Denies any hematuria or blood in stools. Denies any shortness of breath. No chest pain. No cough. No fever.      Medications:   Current Outpatient Medications   Medication Sig Dispense Refill     cevimeline (EVOXAC) 30 MG capsule Take 1 capsule (30 mg) by mouth 2 times daily 180 capsule 3     levonorgestrel (MIRENA) 20 MCG/DAY IUD 1 each by Intrauterine route once       polyethylene glycol-propylene glycol (SYSTANE ULTRA) 0.4-0.3 % SOLN ophthalmic solution Place 1 drop into both eyes 2 times daily as needed for dry eyes       rivaroxaban ANTICOAGULANT (XARELTO ANTICOAGULANT) 20 MG TABS tablet TAKE 1 TABLET(20 MG) BY MOUTH DAILY WITH DINNER 30 tablet 11        Allergies:      Allergies   Allergen Reactions     Keflex [Cephalexin Hcl] Hives     Penicillins Hives     Betadine [Povidone Iodine] Itching     Possibly itchiness, no hives, uncertain allergic reaction.       PMH:   Past Medical History:   Diagnosis Date     IUD (intrauterine device) in place     Mirena inserted 12/28/15     Obese        Social History:   Social History     Tobacco Use     Smoking status: Never     Smokeless tobacco: Never     Tobacco comments:     Lives in smoke free household   Substance Use Topics     Alcohol use: No     Alcohol/week: 0.0 standard drinks of alcohol     Drug use: No       Family History:  Deferred    Objective:  Visual Examination via Video:  Pleasant in no acute distress.  Normal work of breathing   A+O x 3    Labs:  CBC RESULTS:   Recent Labs   Lab Test  11/05/22  1026   WBC 7.4   RBC 4.92   HGB 15.1   HCT 44.3   MCV 90   MCH 30.7   MCHC 34.1   RDW 12.8        Last Comprehensive Metabolic Panel:  Sodium   Date Value Ref Range Status   11/05/2022 141 133 - 144 mmol/L Final   11/23/2020 138 133 - 144 mmol/L Final     Potassium   Date Value Ref Range Status   11/05/2022 4.2 3.4 - 5.3 mmol/L Final   11/23/2020 4.0 3.4 - 5.3 mmol/L Final     Chloride   Date Value Ref Range Status   11/05/2022 109 94 - 109 mmol/L Final   11/23/2020 106 94 - 109 mmol/L Final     Carbon Dioxide   Date Value Ref Range Status   11/23/2020 29 20 - 32 mmol/L Final     Carbon Dioxide (CO2)   Date Value Ref Range Status   11/05/2022 28 20 - 32 mmol/L Final     Anion Gap   Date Value Ref Range Status   11/05/2022 4 3 - 14 mmol/L Final   11/23/2020 3 3 - 14 mmol/L Final     Glucose   Date Value Ref Range Status   11/05/2022 90 70 - 99 mg/dL Final   11/23/2020 89 70 - 99 mg/dL Final     Comment:     Non Fasting     Urea Nitrogen   Date Value Ref Range Status   11/05/2022 11 7 - 30 mg/dL Final   11/23/2020 14 7 - 30 mg/dL Final     Creatinine   Date Value Ref Range Status   11/05/2022 0.75 0.52 - 1.04 mg/dL Final   11/23/2020 0.84 0.52 - 1.04 mg/dL Final     GFR Estimate   Date Value Ref Range Status   11/05/2022 >90 >60 mL/min/1.73m2 Final     Comment:     Effective December 21, 2021 eGFRcr in adults is calculated using the 2021 CKD-EPI creatinine equation which includes age and gender (Mere navarro al., NEJM, DOI: 10.1056/EXNBwp9655122)   11/23/2020 83 >60 mL/min/[1.73_m2] Final     Comment:     Non  GFR Calc  Starting 12/18/2018, serum creatinine based estimated GFR (eGFR) will be   calculated using the Chronic Kidney Disease Epidemiology Collaboration   (CKD-EPI) equation.       Calcium   Date Value Ref Range Status   11/05/2022 9.1 8.5 - 10.1 mg/dL Final   11/23/2020 9.0 8.5 - 10.1 mg/dL Final     Liver Function Studies -   Recent Labs   Lab Test 11/05/22  1026   PROTTOTAL 7.6    ALBUMIN 3.6   BILITOTAL 0.8   ALKPHOS 61   AST 27   ALT 45            Latest Reference Range & Units 16 14:57 16 14:47   Antithrombin III Chromogenic 85 - 135 % 73 (L) [1] 77 (L) (C) [2]           Imagin2016 BLE US                                                         IMPRESSION: No evidence of deep venous thrombosis in either lower  Extremity.    Assessment:  In summary, Jesica Mooney is a 47 year old female with a history of pulmonary embolism in  that was associated with long term estrogen use without additional triggers. Hypercoagulable evaluation was remarkable for persistently mildly reduced levels of antithrombin III. Genetic testing showed SERPINC1 gene mutation that was felt to be likely pathogenic (VUS) thus she was recommended to continue on long term anticoagulation. She remains on Xarelto 20mg once daily without any bleeding complications or recurrent venous thrombembolic events.      Diagnosis:  1. History of pulmonary embolism 2015 - on long term OCPs since discontinued. Remains on Xarelto 20mg once daily.   2. Mild antithrombin III deficiency, with SERPINC gene mutation (VUS)   3. Sjogren's followed by rheumatology   4. Nephrolithiasis    Plan:  Gretchen remains a good candidate for long term anticoagulation with Xarelto. I will refill her prescription for the next year.  Annual CBC and CMP. She typically has these done at her rheumatology appointments.      Video-Visit Details:  Type of service:  Video Visit  Joined the call at 2023, 1:23:40?pm.  Left the call at 2023, 1:36:34?pm.  You were on the call for 12 minutes 54 seconds .  Originating Location (pt. Location): Home  Distant Location (provider location):  [facility]   Mode of Communication:  Video Conference via Alexandre Trejo, MPH, PA-C  [facility]    15 minutes spent by  me on the date of the encounter doing chart review, review of outside records, review of test results, interpretation of tests, patient visit and documentation

## 2023-04-24 NOTE — PATIENT INSTRUCTIONS
[facility]  [facility] [address], [address], [place] [address]  Main: [phone], Fax: [phone]    Jesica,   It was a pleasure seeing you today.  Thank you for allowing us to be involved in your care.  Please let us know if there is anything else we can do for you, so that we can be sure you are leaving completely satisfied with your care experience. Below is the plan that we discussed.     Sent refills in for you with instructions that you do not need a refill right now   Labs at your rheum appointment later this year.    We would like a provider on our team to see you at least annually for optimal care and to allow us to continue to prescribe for you.      Return to clinic in 1 year, virtual OK    Your nurse clinician is Stephanie Lamb, MSN, RN, [phone].   If they are unavailable and you have immediate concerns, please call [phone] and ask for a nurse.     Brittni Trejo, MPH, PA-C  [facility]

## 2023-06-05 ENCOUNTER — OFFICE VISIT (OUTPATIENT)
Dept: OPTOMETRY | Facility: CLINIC | Age: 48
End: 2023-06-05
Payer: COMMERCIAL

## 2023-06-05 DIAGNOSIS — Z01.00 ROUTINE EYE EXAM: Primary | ICD-10-CM

## 2023-06-05 DIAGNOSIS — H52.13 MYOPIA OF BOTH EYES: ICD-10-CM

## 2023-06-05 DIAGNOSIS — H52.223 REGULAR ASTIGMATISM OF BOTH EYES: ICD-10-CM

## 2023-06-05 DIAGNOSIS — H52.4 PRESBYOPIA: ICD-10-CM

## 2023-06-05 PROCEDURE — 92014 COMPRE OPH EXAM EST PT 1/>: CPT | Performed by: OPTOMETRIST

## 2023-06-05 PROCEDURE — 92015 DETERMINE REFRACTIVE STATE: CPT | Performed by: OPTOMETRIST

## 2023-06-05 ASSESSMENT — REFRACTION_WEARINGRX
SPECS_TYPE: SVL DISTANCE
OS_CYLINDER: +1.25
OS_CYLINDER: +1.25
OD_SPHERE: -10.25
OS_SPHERE: -9.75
OS_SPHERE: -9.75
OD_SPHERE: -10.25
OD_CYLINDER: +1.25
OD_CYLINDER: +1.25
OD_ADD: +1.50
OD_AXIS: 060
OS_AXIS: 080
OD_AXIS: 060
OS_AXIS: 080
OS_ADD: +1.50

## 2023-06-05 ASSESSMENT — VISUAL ACUITY
CORRECTION_TYPE: GLASSES
METHOD: SNELLEN - LINEAR
OD_CC: 20/20 -2
OS_CC: 20/20
OD_CC: 20/25
OS_CC: 20/25 +2
OS_CC+: -1

## 2023-06-05 ASSESSMENT — CONF VISUAL FIELD
OS_INFERIOR_TEMPORAL_RESTRICTION: 0
OD_SUPERIOR_TEMPORAL_RESTRICTION: 0
OD_NORMAL: 1
OS_SUPERIOR_TEMPORAL_RESTRICTION: 0
OD_INFERIOR_TEMPORAL_RESTRICTION: 0
OS_SUPERIOR_NASAL_RESTRICTION: 0
OD_INFERIOR_NASAL_RESTRICTION: 0
OS_NORMAL: 1
OD_SUPERIOR_NASAL_RESTRICTION: 0
OS_INFERIOR_NASAL_RESTRICTION: 0

## 2023-06-05 ASSESSMENT — REFRACTION_MANIFEST
OS_AXIS: 065
OD_CYLINDER: +1.00
OS_SPHERE: -9.25
OS_ADD: +2.00
OD_SPHERE: -9.75
OD_AXIS: 068
OS_CYLINDER: +1.25
OD_ADD: +2.00
OS_AXIS: 074
OS_SPHERE: -9.75
OD_AXIS: 060
METHOD_AUTOREFRACTION: 1
OD_SPHERE: -10.25
OD_CYLINDER: +1.00
OS_CYLINDER: +1.50

## 2023-06-05 ASSESSMENT — EXTERNAL EXAM - LEFT EYE: OS_EXAM: NORMAL

## 2023-06-05 ASSESSMENT — TONOMETRY
OD_IOP_MMHG: 15
OS_IOP_MMHG: 15
IOP_METHOD: APPLANATION

## 2023-06-05 ASSESSMENT — SLIT LAMP EXAM - LIDS
COMMENTS: NORMAL
COMMENTS: NORMAL

## 2023-06-05 ASSESSMENT — EXTERNAL EXAM - RIGHT EYE: OD_EXAM: NORMAL

## 2023-06-05 ASSESSMENT — CUP TO DISC RATIO
OD_RATIO: 0.2
OS_RATIO: 0.2

## 2023-06-05 NOTE — PATIENT INSTRUCTIONS
Optional to fill new glasses prescription, minimal change in no line   Continue artificial tears as needed   Return in 1 year for eye exam    Minnie Akers, OD  [phone]

## 2023-06-05 NOTE — PROGRESS NOTES
Chief Complaint   Patient presents with     COMPREHENSIVE EYE EXAM       Gretchen does not want a contact lens fitting today  Last Eye Exam: 6/22/22  Dilated Previously: Yes    What are you currently using to see?  glasses and contacts       Distance Vision Acuity: Satisfied with vision    Near Vision Acuity: Not satisfied     Eye Comfort: dry  Do you use eye drops? : Yes: systane balance x 10 years  Occupation or Hobbies: [employer]    [employer]  Optometric Assistant, [employer]            Medical, surgical and family histories reviewed and updated 6/5/2023.       OBJECTIVE: See Ophthalmology exam    ASSESSMENT:    ICD-10-CM    1. Routine eye exam  Z01.00       2. Myopia of both eyes  H52.13       3. Regular astigmatism of both eyes  H52.223       4. Presbyopia  H52.4           PLAN:     Patient Instructions   Optional to fill new glasses prescription, minimal change in no line   Continue artificial tears as needed   Return in 1 year for eye exam    Minnie Akers, OD  [phone]

## 2023-06-05 NOTE — LETTER
6/5/2023         RE: Jesica Mooney  [address]  [place] [address]        Dear Colleague,    Thank you for referring your patient, Jesica Mooney, to the [facility]. Please see a copy of my visit note below.    Chief Complaint   Patient presents with     COMPREHENSIVE EYE EXAM       Gretchen does not want a contact lens fitting today  Last Eye Exam: 6/22/22  Dilated Previously: Yes    What are you currently using to see?  glasses and contacts       Distance Vision Acuity: Satisfied with vision    Near Vision Acuity: Not satisfied     Eye Comfort: dry  Do you use eye drops? 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       OBJECTIVE: See Ophthalmology exam    ASSESSMENT:    ICD-10-CM    1. Routine eye exam  Z01.00       2. Myopia of both eyes  H52.13       3. Regular astigmatism of both eyes  H52.223       4. Presbyopia  H52.4           PLAN:     Patient Instructions   Optional to fill new glasses prescription, minimal change in no line   Continue artificial tears as needed   Return in 1 year for eye exam    Minnie Akers, OD  [phone]                    Again, thank you for allowing me to participate in the care of your patient.        Sincerely,        Minnie Akers, OD

## 2023-10-11 ENCOUNTER — PATIENT OUTREACH (OUTPATIENT)
Dept: CARE COORDINATION | Facility: CLINIC | Age: 48
End: 2023-10-11
Payer: COMMERCIAL

## 2023-10-16 ENCOUNTER — LAB (OUTPATIENT)
Dept: LAB | Facility: CLINIC | Age: 48
End: 2023-10-16
Payer: COMMERCIAL

## 2023-10-16 DIAGNOSIS — M35.01 SJOGREN'S SYNDROME WITH KERATOCONJUNCTIVITIS SICCA (H): ICD-10-CM

## 2023-10-16 LAB
ALBUMIN MFR UR ELPH: <6 MG/DL
ALBUMIN SERPL BCG-MCNC: 4.2 G/DL (ref 3.5–5.2)
ALBUMIN UR-MCNC: NEGATIVE MG/DL
ALP SERPL-CCNC: 58 U/L (ref 35–104)
ALT SERPL W P-5'-P-CCNC: 20 U/L (ref 0–50)
ANION GAP SERPL CALCULATED.3IONS-SCNC: 8 MMOL/L (ref 7–15)
APPEARANCE UR: CLEAR
AST SERPL W P-5'-P-CCNC: 24 U/L (ref 0–45)
BACTERIA #/AREA URNS HPF: ABNORMAL /HPF
BASO+EOS+MONOS # BLD AUTO: NORMAL 10*3/UL
BASO+EOS+MONOS NFR BLD AUTO: NORMAL %
BASOPHILS # BLD AUTO: 0 10E3/UL (ref 0–0.2)
BASOPHILS NFR BLD AUTO: 1 %
BILIRUB SERPL-MCNC: 0.5 MG/DL
BILIRUB UR QL STRIP: NEGATIVE
BUN SERPL-MCNC: 9 MG/DL (ref 6–20)
CALCIUM SERPL-MCNC: 9.7 MG/DL (ref 8.6–10)
CHLORIDE SERPL-SCNC: 104 MMOL/L (ref 98–107)
COLOR UR AUTO: YELLOW
CREAT SERPL-MCNC: 0.85 MG/DL (ref 0.51–0.95)
CREAT UR-MCNC: 39.1 MG/DL
CRP SERPL-MCNC: <3 MG/L
DEPRECATED HCO3 PLAS-SCNC: 25 MMOL/L (ref 22–29)
EGFRCR SERPLBLD CKD-EPI 2021: 84 ML/MIN/1.73M2
EOSINOPHIL # BLD AUTO: 0.5 10E3/UL (ref 0–0.7)
EOSINOPHIL NFR BLD AUTO: 8 %
ERYTHROCYTE [DISTWIDTH] IN BLOOD BY AUTOMATED COUNT: 12.3 % (ref 10–15)
ERYTHROCYTE [SEDIMENTATION RATE] IN BLOOD BY WESTERGREN METHOD: 12 MM/HR (ref 0–20)
GLUCOSE SERPL-MCNC: 96 MG/DL (ref 70–99)
GLUCOSE UR STRIP-MCNC: NEGATIVE MG/DL
HCT VFR BLD AUTO: 45.6 % (ref 35–47)
HGB BLD-MCNC: 15.5 G/DL (ref 11.7–15.7)
HGB UR QL STRIP: ABNORMAL
IMM GRANULOCYTES # BLD: 0 10E3/UL
IMM GRANULOCYTES NFR BLD: 0 %
KETONES UR STRIP-MCNC: NEGATIVE MG/DL
LEUKOCYTE ESTERASE UR QL STRIP: NEGATIVE
LYMPHOCYTES # BLD AUTO: 2.4 10E3/UL (ref 0.8–5.3)
LYMPHOCYTES NFR BLD AUTO: 37 %
MCH RBC QN AUTO: 30.8 PG (ref 26.5–33)
MCHC RBC AUTO-ENTMCNC: 34 G/DL (ref 31.5–36.5)
MCV RBC AUTO: 91 FL (ref 78–100)
MONOCYTES # BLD AUTO: 0.7 10E3/UL (ref 0–1.3)
MONOCYTES NFR BLD AUTO: 11 %
NEUTROPHILS # BLD AUTO: 2.9 10E3/UL (ref 1.6–8.3)
NEUTROPHILS NFR BLD AUTO: 44 %
NITRATE UR QL: NEGATIVE
PH UR STRIP: 6 [PH] (ref 5–7)
PLATELET # BLD AUTO: 225 10E3/UL (ref 150–450)
POTASSIUM SERPL-SCNC: 4.4 MMOL/L (ref 3.4–5.3)
PROT SERPL-MCNC: 7.7 G/DL (ref 6.4–8.3)
PROT/CREAT 24H UR: NORMAL MG/G{CREAT}
RBC # BLD AUTO: 5.03 10E6/UL (ref 3.8–5.2)
RBC #/AREA URNS AUTO: ABNORMAL /HPF
SODIUM SERPL-SCNC: 137 MMOL/L (ref 135–145)
SP GR UR STRIP: 1.01 (ref 1–1.03)
SQUAMOUS #/AREA URNS AUTO: ABNORMAL /LPF
TOTAL PROTEIN SERUM FOR ELP: 7.3 G/DL (ref 6.4–8.3)
UROBILINOGEN UR STRIP-ACNC: 0.2 E.U./DL
WBC # BLD AUTO: 6.5 10E3/UL (ref 4–11)
WBC #/AREA URNS AUTO: ABNORMAL /HPF

## 2023-10-16 PROCEDURE — 86140 C-REACTIVE PROTEIN: CPT

## 2023-10-16 PROCEDURE — 85652 RBC SED RATE AUTOMATED: CPT

## 2023-10-16 PROCEDURE — 85025 COMPLETE CBC W/AUTO DIFF WBC: CPT

## 2023-10-16 PROCEDURE — 84155 ASSAY OF PROTEIN SERUM: CPT | Mod: 59

## 2023-10-16 PROCEDURE — 84165 PROTEIN E-PHORESIS SERUM: CPT | Performed by: PATHOLOGY

## 2023-10-16 PROCEDURE — 84156 ASSAY OF PROTEIN URINE: CPT

## 2023-10-16 PROCEDURE — 36415 COLL VENOUS BLD VENIPUNCTURE: CPT

## 2023-10-16 PROCEDURE — 81001 URINALYSIS AUTO W/SCOPE: CPT

## 2023-10-16 PROCEDURE — 80053 COMPREHEN METABOLIC PANEL: CPT

## 2023-10-17 LAB
ALBUMIN SERPL ELPH-MCNC: 4 G/DL (ref 3.7–5.1)
ALPHA1 GLOB SERPL ELPH-MCNC: 0.2 G/DL (ref 0.2–0.4)
ALPHA2 GLOB SERPL ELPH-MCNC: 0.6 G/DL (ref 0.5–0.9)
B-GLOBULIN SERPL ELPH-MCNC: 0.8 G/DL (ref 0.6–1)
GAMMA GLOB SERPL ELPH-MCNC: 1.6 G/DL (ref 0.7–1.6)
M PROTEIN SERPL ELPH-MCNC: 0 G/DL
PROT PATTERN SERPL ELPH-IMP: NORMAL

## 2023-10-23 ENCOUNTER — TELEPHONE (OUTPATIENT)
Dept: UROLOGY | Facility: CLINIC | Age: 48
End: 2023-10-23

## 2023-10-23 ENCOUNTER — OFFICE VISIT (OUTPATIENT)
Dept: RHEUMATOLOGY | Facility: CLINIC | Age: 48
End: 2023-10-23
Payer: COMMERCIAL

## 2023-10-23 VITALS
OXYGEN SATURATION: 96 % | DIASTOLIC BLOOD PRESSURE: 77 MMHG | HEART RATE: 69 BPM | RESPIRATION RATE: 16 BRPM | BODY MASS INDEX: 39.82 KG/M2 | SYSTOLIC BLOOD PRESSURE: 114 MMHG | WEIGHT: 224.8 LBS

## 2023-10-23 DIAGNOSIS — N20.0 KIDNEY STONE: Primary | ICD-10-CM

## 2023-10-23 DIAGNOSIS — R31.29 OTHER MICROSCOPIC HEMATURIA: ICD-10-CM

## 2023-10-23 DIAGNOSIS — M35.01 SJOGREN'S SYNDROME WITH KERATOCONJUNCTIVITIS SICCA (H): Primary | ICD-10-CM

## 2023-10-23 PROCEDURE — 99214 OFFICE O/P EST MOD 30 MIN: CPT | Performed by: INTERNAL MEDICINE

## 2023-10-23 RX ORDER — CEVIMELINE HYDROCHLORIDE 30 MG/1
30 CAPSULE ORAL 2 TIMES DAILY
Qty: 180 CAPSULE | Refills: 3 | Status: SHIPPED | OUTPATIENT
Start: 2023-10-23

## 2023-10-23 NOTE — Clinical Note
Gretchen Maria says she is overdue for urology follow-up that was supposed to happen after the CT scan that she didn't have completed.  Would you mind having your team reach out to her to schedule the urology follow-up and extend the CT scan so that she can have that completed?   Thank you! Dylan Donato

## 2023-10-23 NOTE — PROGRESS NOTES
Rheumatology Clinic Visit      Jesica Mooney MRN# [id number]   YOB: 1975 Age: 48 year old      Date of visit: 10/23/23   Hematology/Oncology: Dr. Flavia Leon and Dr. Ata Rendon  PCP: Brenda Graves    Chief Complaint   Patient presents with:  Sjogren's syndrome    Assessment and Plan     1. Sjogren's Syndrome (JAN >1:20374, SSA >8):  She has punctal plugs; restasis was reportedly ineffective when she used a sample from her ophthalmologist.  She continues following with her ophthalmologist.  Pilocarpine was tolerated but provided no benefit so she stopped it with no change in symptoms; now on Evoxac and she finds that 30 mg 1-2 times daily is sufficient to control her dry mouth.  Good dentition and follows with a dentist every 6 months.  No recent cavities.  Chronic illness, stable.    - Continue  Evoxac 30mg 1-2 times daily  - Labs in 1 year: CBC, CMP,  UA, Uprotein:creatinine    2. Pulmonary Embolism: Diagnosed 12/2015 and is on anticoagulation that is managed in another clinic.  Documented here for historical significance    3.  Left parotid cyst: Following with ENT and has had this drained previously and about once every 6 months for period of time but not for over 1 year now.  Following with Dr. Hernandez (ENT) and has already discussed about possibly having surgery for this and that is an option that she may consider in the future if the cyst continues to recur.    4.  Microscopic hematuria: History of nephrolithiasis and has already seen urology.  Was supposed to have a repeat CT scan but this has not yet been done.  Will send a message to Indu Farfan and the patient will reach out to the urology clinic as well.     5.  Vaccinations: Vaccinations reviewed with Ms. Mooney.    - Influenza: encouraged yearly vaccination  - Shingrix: Up to date  - COVID-19: Advised keeping updated    Total minutes spent in evaluation with patient, documentation, , and review of pertinent  studies and chart notes: 22     Thank you for involving me in the care of the patient    Return to clinic: 11-12 months, earlier PRN    HPI   Jesicaaustin Mooney is a 48 year old female with history of pulmonary embolism and obesity who presents for follow-up of Sjogren's Syndrome.     Today, 10/23/2023: Has not had the left parotid cyst drained for over 1 year now.  Left parotid cyst is not bothering her but is still firm and unchanged per patient.  Dry mouth controlled with Evoxac and frequent sips of water.  She notes that she drinks coffee throughout the morning.  Follows with a dentist at least twice yearly and has not had a recent dental caries.  Dry eyes treated with artificial tears, and warm compresses 1-2 times per day.  She is considering artificial eyelashes and tattooed eyeliner; she will discuss this with her ophthalmologist first before proceeding.    Denies fevers, chills, nausea, vomiting, constipation, diarrhea. No abdominal pain. No chest pain/pressure, palpitations, or shortness of breath. No oral or nasal sores. No neck pain. No rash.  No weight loss or night sweats.    Tobacco: None  EtOH: None  Drugs: None    ROS   12 point review of system was completed and negative except as noted in the HPI     Active Problem List     Patient Active Problem List   Diagnosis    BMI 40.0-44.9, adult (H)    CARDIOVASCULAR SCREENING; LDL GOAL LESS THAN 160    Other acute pulmonary embolism    Sjogren's syndrome (H24)    Screening for cervical cancer    Obstruction of left ureteropelvic junction due to stone     Past Medical History     Past Medical History:   Diagnosis Date    IUD (intrauterine device) in place     Mirena inserted 12/28/15    Obese      Past Surgical History     Past Surgical History:   Procedure Laterality Date    BIOPSY  a fews years ago    A mole was removed from by scalp    LASER HOLMIUM LITHOTRIPSY URETER(S), INSERT STENT, COMBINED Left 7/20/2022    Procedure: LEFT CYSTOURETEROSCOPY, WITH  LITHOTRIPSY USING LASER AND URETERAL STENT INSERTION;  Surgeon: Jesus Roper MD;  Location: [facility]    [facility] ORAL SURGERY PROCEDURE  10/1990    wisdom teeth extracted     Allergy     Allergies   Allergen Reactions    Keflex [Cephalexin Hcl] Hives    Penicillins Hives    Betadine [Povidone Iodine] Itching     Possibly itchiness, no hives, uncertain allergic reaction.     Current Medication List     Current Outpatient Medications   Medication Sig    cevimeline (EVOXAC) 30 MG capsule Take 1 capsule (30 mg) by mouth 2 times daily    levonorgestrel (MIRENA) 20 MCG/DAY IUD 1 each by Intrauterine route once    polyethylene glycol-propylene glycol (SYSTANE ULTRA) 0.4-0.3 % SOLN ophthalmic solution Place 1 drop into both eyes 2 times daily as needed for dry eyes    rivaroxaban ANTICOAGULANT (XARELTO ANTICOAGULANT) 20 MG TABS tablet TAKE 1 TABLET(20 MG) BY MOUTH DAILY WITH DINNER     No current facility-administered medications for this visit.     Social History   See HPI    Family History     Family History   Problem Relation Age of Onset    Eye Disorder Mother         cataracts     Rheumatoid Arthritis Mother     Cancer - colorectal Paternal Grandmother         ?    Thyroid Disease Father     Breast Cancer Maternal Aunt         diagnosed in 30s (mother's 1/2 sister)    Cancer No family hx of     Diabetes No family hx of     Hypertension No family hx of     Cerebrovascular Disease No family hx of     Macular Degeneration No family hx of     Glaucoma No family hx of      Mother: RA    Physical Exam     Temp Readings from Last 3 Encounters:   07/20/22 97.6  F (36.4  C) (Temporal)   06/06/22 97.9  F (36.6  C) (Tympanic)   10/20/21 98.4  F (36.9  C) (Tympanic)     BP Readings from Last 5 Encounters:   10/23/23 114/77   11/14/22 116/83   10/18/22 108/77   07/20/22 125/85   06/06/22 118/80     Pulse Readings from Last 1 Encounters:   10/23/23 69     Resp Readings from Last 1 Encounters:   10/23/23 16     Estimated body mass  "index is 39.82 kg/m  as calculated from the following:    Height as of 7/20/22: 1.6 m (5' 3\").    Weight as of this encounter: 102 kg (224 lb 12.8 oz).    GEN: NAD.  HEENT:  Anicteric, noninjected sclera. No obvious external lesions of the ear and nose. Hearing intact.  Dry mucous membranes.  Eyes appear to have good moisture by gross examination; tear pooling present.  CV: S1, S2. RRR. No m/r/g  PULM: No increased work of breathing. CTA bilaterally   MSK: MCPs, PIPs, DIPs without swelling or tenderness to palpation.  Wrists without swelling or tenderness to palpation.  Elbows without swelling or tenderness to palpation.   Knees and ankles without swelling or tenderness to palpation.  Negative MTP squeeze bilaterally.    SKIN: No rash or jaundice seen  PSYCH: Alert. Appropriate.      Labs / Imaging (select studies)     JAN  Recent Labs   Lab Test 05/24/16  0822 04/14/16  1447 01/12/16  1457   AVA  --  10.9* 11.3*   ANAIGG >1:36667  Reference range: <1:40  (Note)  Mixed Homogeneous and Speckled pattern.  INTERPRETIVE INFORMATION: JAN by IFA, IgG  Anti-nuclear antibodies (JAN) are seen in a variety of  systemic rheumatic diseases and are determined by indirect  fluorescence assay (IFA) using HEp-2 substrate with an  IgG-specific conjugate. JAN titers less than or equal to  1:80 have variable relevance while titers greater than or  equal to 1:160 are considered clinically significant. These  antibodies may precede clinical disease onset; however,  healthy individuals and those with advanced age have been  reported to be positive for JAN. When observed, one of the  five basic patterns is reported: homogeneous,  peripheral/rim, speckled, centromere, or nucleolar. If  cytoplasmic fluorescence is observed, it is noted. IFA  methodology is subjective and has occasionally been shown  to lack sensitivity for anti-SSA/Ro antibodies.  Negative results do not necessarily rule out the presence  of SSc. If clinical nelson spicion " remains, consider further  testing for U3-RNP, PM/Scl, or Th/To antibodies associated  with SSc.  Performed by [facility],  [address] [place] [place] [address] [phone]  [email or web address], Charles Kumar MD, Lab. Director    --   --      RNP/Sm/SSA/SSB  Recent Labs   Lab Test 05/24/16  0822   RNPIGG 0.9   SMIGG <0.2  Negative   Antibody index (AI) values reflect qualitative changes in antibody   concentration that cannot be directly associated with clinical condition or   disease state.     SSAIGG >8.0  Positive   Antibody index (AI) values reflect qualitative changes in antibody   concentration that cannot be directly associated with clinical condition or   disease state.  *   SSBIGG 0.3   SCLIGG <0.2  Negative   Antibody index (AI) values reflect qualitative changes in antibody   concentration that cannot be directly associated with clinical condition or   disease state.       dsDNA  Recent Labs   Lab Test 11/25/16  1512 05/24/16  0822   DNA 5 6     C3/C4  Recent Labs   Lab Test 11/27/17  0846 11/25/16  1512 05/24/16  0822   E9JDDGN 98 98 99   U7NQZQA 21 21 19     Antiphospholipid Antibodies  Recent Labs   Lab Test 04/14/16  1447 01/12/16  1457   B2GPG  --  2.1   B2GPM  --  <0.9  Negative     CARG <15.0  Interpretation:  Negative   <15.0  Interpretation:  Negative     DEEPTI <12.5  Interpretation:  Negative   14.7*   LUPINT  --  Negative  (Note)  COMMENTS:  The INR is normal.  APTT ratio is elevated.  Platelet Neutralization is negative.  APTT 1:2 Mix ratio is normal.  DRVVT Screen ratio is normal.  Thrombin time is normal.  NEGATIVE TEST; A LUPUS ANTICOAGULANT WAS NOT DETECTED IN THIS  SPECIMEN WITHIN THE LIMITS OF THE TESTING REPERTOIRE.  If the clinical picture is strongly suggestive of an antiphospholipid  syndrome, recommend anticardiolipin and beta-2-glycoprotein (IgG and  IgM) antibody tests.  Platelet Neutralization and APTT 1:2 Mix ratio are suggestive of  factor deficiency.  Recommend factors 8, 9,  11 and 12 (factors VIII, IX, XI, and XII)  levels if clinically indicated.  Nimisha Lovell M.D.  [phone]  1/15/2016    INR =  1.06    Reference range: 0.86-1.14  Thrombin Time= 16.5    Reference range: 13.0-19.0 sec    APTT:       Ratio  Patient  =  1.21  1:2 Mix  =  1.05  Reference:  Negative: Less than or equal to 1.16  Positive: Greater than or equal to 1.17    Platelet Neutralization (second s):  PTT Buffer-PTT Platelet Lysate =  0  Reference:  Negative: Less than or equal to 0  Positive: Greater than or equal to 1     DILUTE BRANDY VIPER VENOM TEST:  Screen Ratio = 1.15   Normal is less than 1.21         CBC  Recent Labs   Lab Test 10/16/23  0744 11/05/22  1026 06/06/22  0822 11/21/21  1016 11/23/20  0853 11/25/19  0837 11/26/18  0830   WBC 6.5 7.4 6.9   < > 7.6 7.0 7.3   RBC 5.03 4.92 4.81   < > 4.88 5.13 4.91   HGB 15.5 15.1 14.9   < > 15.0 15.7 15.1   HCT 45.6 44.3 43.8   < > 43.6 46.9 44.4   MCV 91 90 91   < > 89 91 90   RDW 12.3 12.8 13.1   < > 12.7 12.8 12.6    280 229   < > 254 248 246   MCH 30.8 30.7 31.0   < > 30.7 30.6 30.8   MCHC 34.0 34.1 34.0   < > 34.4 33.5 34.0   NEUTROPHIL 44 49 51   < > 48.2 47.9 44.9   LYMPH 37 36 29   < > 31.7 33.7 39.3   MONOCYTE 11 8 10   < > 12.3 9.7 9.4   EOSINOPHIL 8 6 9   < > 7.4 8.3 6.1   BASOPHIL 1 1 1   < > 0.4 0.4 0.3   ANEU  --   --   --   --  3.7 3.3 3.3   ALYM  --   --   --   --  2.4 2.4 2.9   CARLY  --   --   --   --  0.9 0.7 0.7   AEOS  --   --   --   --  0.6 0.6 0.4   ABAS  --   --   --   --  0.0 0.0 0.0   ANEUTAUTO 2.9 3.6 3.5   < >  --   --   --    ALYMPAUTO 2.4 2.7 2.0   < >  --   --   --    AMONOAUTO 0.7 0.6 0.7   < >  --   --   --    AEOSAUTO 0.5 0.5 0.6   < >  --   --   --    ABSBASO 0.0 0.1 0.1   < >  --   --   --     < > = values in this interval not displayed.     CMP  Recent Labs   Lab Test 10/16/23  0744 11/05/22  1026 06/06/22  0822 11/21/21  1016 11/23/20  0853 11/25/19  0837 11/26/18  0830 11/27/17  0846    141 139 138 138  137 138 137   POTASSIUM 4.4 4.2 4.1 4.0 4.0 4.2 4.1 4.0   CHLORIDE 104 109 107 107 106 108 106 105   CO2 25 28 29 27 29 28 27 29   ANIONGAP 8 4 3 4 3 1* 5 3   GLC 96 90 108* 87 89 98 96 88   BUN 9.0 11 12 12 14 13 14 14   CR 0.85 0.75 0.81 0.73 0.84 0.78 0.81 0.90   GFRESTIMATED 84 >90 90 >90 83 >90 77 68   GFRESTBLACK  --   --   --   --  >90 >90 >90 82   BROOKS 9.7 9.1 9.3 9.0 9.0 9.6 9.3 9.2   BILITOTAL 0.5 0.8 0.4 0.5 0.6 0.4 0.3 0.4   ALBUMIN 4.2 3.6 3.5 3.2* 3.4 3.6 3.6 3.8   PROTTOTAL 7.7 7.6 7.8 7.6 7.4 7.9 8.1 8.5   ALKPHOS 58 61 59 59 69 61 63 65   AST 24 27 27 26 21 22 25 26   ALT 20 45 46 47 46 36 35 42     Calcium/VitaminD  Recent Labs   Lab Test 10/16/23  0744 11/05/22  1026 06/06/22  0822   BROOKS 9.7 9.1 9.3     ESR/CRP  Recent Labs   Lab Test 10/16/23  0744 11/27/17  0846 11/25/16  1512 05/24/16  0822   SED 12 15 23* 17   CRP  --  <2.9 <2.9 <2.9   CRPI <3.00  --   --   --      CK/Aldolase  Recent Labs   Lab Test 11/25/16  1512 05/24/16  0822   CKT 96 72     Hepatitis C  Recent Labs   Lab Test 11/23/20  0853   HCVAB Nonreactive     HIV Screening  Recent Labs   Lab Test 11/23/20  0853   HIAGAB Nonreactive     UA  Recent Labs   Lab Test 10/16/23  0809 11/05/22  1103 06/06/22  0746 11/21/21  1016 11/23/20  0906 11/25/19  0840 11/26/18  0838   COLOR Yellow Yellow Brown*   < > Yellow Yellow Yellow   APPEARANCE Clear Clear Cloudy*   < > Clear Clear Clear   URINEGLC Negative Negative Negative   < > Negative Negative Negative   URINEBILI Negative Negative Negative   < > Negative Negative Negative   SG 1.010 <=1.005 >=1.030   < > 1.015 1.015 1.010   URINEPH 6.0 6.5 5.5   < > 6.0 6.5 6.5   PROTEIN Negative Negative 100*   < > Negative Negative Negative   UROBILINOGEN 0.2 0.2 0.2   < > 0.2 0.2 0.2   NITRITE Negative Negative Negative   < > Negative Negative Negative   UBLD Trace* Trace* Large*   < > Small* Small* Trace*   LEUKEST Negative Negative Trace*   < > Trace* Negative Trace*   WBCU 0-5 None Seen 0-5   < > 0 -  5 0 - 5 0 - 5   RBCU 0-2 5-10* >100*   < > O - 2 O - 2 2-5*   SQUAMOUSEPI  --   --   --   --  Moderate* Moderate* Few   BACTERIA Few* None Seen Few*  --   --  Few*  --    MUCOUS  --   --   --   --  Present*  --   --     < > = values in this interval not displayed.     Urine Microscopic  Recent Labs   Lab Test 10/16/23  0809 11/05/22  1103 06/06/22  0746 11/21/21  1016 11/23/20  0906 11/25/19  0840 11/26/18  0838   WBCU 0-5 None Seen 0-5   < > 0 - 5 0 - 5 0 - 5   RBCU 0-2 5-10* >100*   < > O - 2 O - 2 2-5*   SQUAMOUSEPI  --   --   --   --  Moderate* Moderate* Few   BACTERIA Few* None Seen Few*  --   --  Few*  --    MUCOUS  --   --   --   --  Present*  --   --     < > = values in this interval not displayed.     Urine Protein  GHUTP and UTP= Urine protein (random), GHUTPG and UTPG = urine protein:creatinine ratio (random), UCRR = urine creatinine (random)  Recent Labs   Lab Test 10/16/23  0809 11/05/22  1103 11/21/21  1016 11/23/20  0906 11/25/19  0840 11/26/18  0838   GHUTP <6.0 6.1  --   --   --   --    UTP  --   --  <0.05 <0.05 0.06 <0.05   GHUTPG  --  0.13  --   --   --   --    UTPG  --   --   --  Unable to calculate due to low value 0.07 Unable to calculate due to low value   UCRR 39.1 48.0 28 98 81 46     Immunization History     Immunization History   Administered Date(s) Administered    COVID-19 Bivalent 12+ (Pfizer) 10/29/2022    COVID-19 MONOVALENT 12+ (Pfizer) 03/24/2021, 04/12/2021, 11/26/2021    Influenza (IIV3) PF 10/02/2012    Influenza Vaccine >6 months (Alfuria,Fluzone) 10/05/2015, 10/13/2017, 10/15/2018, 09/16/2019, 10/13/2020, 10/20/2021    Nasal Influenza Vaccine 2-49 (FluMist) 10/11/2013, 10/16/2014, 10/11/2016    Pneumo Conj 13-V (2010&after) 03/12/2019    Pneumococcal 23 valent 09/03/2019    TDAP (Adacel,Boostrix) 01/17/2008    TDAP Vaccine (Adacel) 12/03/2018    Twinrix A/B 12/17/2012, 01/16/2013, 06/18/2013    Zoster recombinant adjuvanted (SHINGRIX) 03/12/2019, 09/03/2019          Chart  documentation done in part with Dragon Voice recognition Software. Although reviewed after completion, some word and grammatical error may remain.    Dylan Donato MD

## 2023-10-23 NOTE — PATIENT INSTRUCTIONS
RHEUMATOLOGY    [facility] [place]  [address]  [place] [address]    Phone number: [phone]  Fax number: [phone]    If you need a medication refill, please contact us as you may need lab work and/or a follow up visit prior to your refill.      Thank you for choosing [facility]!    Brittanie Rouse CMA Rheumatology    ------------------------------------------    If you do not hear from your urologist, please reach out to that clinic:   Indu Farfan, CNP  [phone]

## 2023-10-23 NOTE — TELEPHONE ENCOUNTER
Spoke with pt to schedule follow up with Indu Farfan, pt wants to schedule CT first. Pt will call me directly once she has scheduled the imaging to make sure follow up is afterwards. Did advise Indu Miles is booked until at least Dec at the moment.

## 2023-10-23 NOTE — NURSING NOTE
RAPID3 (0-30) Cumulative Score  1.0          RAPID3 Weighted Score (divide #4 by 3 and that is the weighted score)  0.3

## 2023-11-02 ENCOUNTER — ANCILLARY PROCEDURE (OUTPATIENT)
Dept: CT IMAGING | Facility: CLINIC | Age: 48
End: 2023-11-02
Attending: NURSE PRACTITIONER
Payer: COMMERCIAL

## 2023-11-02 DIAGNOSIS — N20.0 KIDNEY STONE: ICD-10-CM

## 2023-11-02 LAB — RADIOLOGIST FLAGS: ABNORMAL

## 2023-11-02 PROCEDURE — 74176 CT ABD & PELVIS W/O CONTRAST: CPT | Mod: GC | Performed by: STUDENT IN AN ORGANIZED HEALTH CARE EDUCATION/TRAINING PROGRAM

## 2023-11-05 ENCOUNTER — PRE VISIT (OUTPATIENT)
Dept: UROLOGY | Facility: CLINIC | Age: 48
End: 2023-11-05
Payer: COMMERCIAL

## 2023-11-08 ENCOUNTER — PATIENT OUTREACH (OUTPATIENT)
Dept: CARE COORDINATION | Facility: CLINIC | Age: 48
End: 2023-11-08
Payer: COMMERCIAL

## 2023-11-17 ENCOUNTER — VIRTUAL VISIT (OUTPATIENT)
Dept: UROLOGY | Facility: CLINIC | Age: 48
End: 2023-11-17
Payer: COMMERCIAL

## 2023-11-17 DIAGNOSIS — N20.0 KIDNEY STONE: Primary | ICD-10-CM

## 2023-11-17 PROCEDURE — 99213 OFFICE O/P EST LOW 20 MIN: CPT | Mod: VID | Performed by: NURSE PRACTITIONER

## 2023-11-17 ASSESSMENT — PAIN SCALES - GENERAL: PAINLEVEL: MODERATE PAIN (4)

## 2023-11-17 NOTE — PROGRESS NOTES
Virtual Visit Details    Type of service:  Video Visit   Originating Location (pt. Location): Home  Distant Location (provider location):  Off-site  Platform used for Video Visit: Blooie    Video start time: 3:35 PM  Video end time: 3:56 PM    CC: Kidney stones    Assessment/Plan:  48 year old female with a history of CaOx kidney stones, s/p URS last summer, with CT from earlier this month showing a nonobstructing 4 mm stone on the right, which appears to be new from her previous scan. Given this finding, recommend that we pursue 24-hour urine testing and she is open to doing this now. She is aware that her IUD is displaced on her scan and will discuss with OBGYN.  -Brendak x1 and follow up with me in the coming months to review results.     Indu Farfan, CNP  Department of Urology      Subjective:   48 year old female who presents for follow up regarding kidney stones. She is s/p URS last summer for treatment of an 8 mm UPJ stone, composed predominantly of CaOx. I saw her post-op in 08/2022 and gen recs reviewed. Diet low in fluid and high in salt. Planned for 6 month follow up with imaging but she was somewhat lost to follow up.     She did complete a CT A/P earlier this month, with results showing a nonobstructive 4 mm right renal stone. Her IUD also noted to be out of position.     Objective:     Exam:   Patient is a 48 year old female   No vital signs obtained due to virtual visit.  General Appearance: Well groomed, hygenic  Respiratory: No cough, no respiratory distress or labored breathing  Musculoskeletal: Grossly normal with no gross deficits  Skin: No discoloration or apparent rashes  Neurologic: No tremors  Psychiatric: Alert and oriented  Further examination is deferred due to the nature of our visit.

## 2023-11-17 NOTE — LETTER
11/17/2023       RE: Jesica Mooney  [address]  [place] [address]     Dear Colleague,    Thank you for referring your patient, Jesica Mooney, to the [facility] UROLOGY CLINIC [place] at [facility]. Please see a copy of my visit note below.    Virtual Visit Details    Type of service:  Video Visit   Originating Location (pt. Location): Home  Distant Location (provider location):  Off-site  Platform used for Video Visit: DropShip    Video start time: 3:35 PM  Video end time: 3:56 PM    CC: Kidney stones    Assessment/Plan:  48 year old female with a history of CaOx kidney stones, s/p URS last summer, with CT from earlier this month showing a nonobstructing 4 mm stone on the right, which appears to be new from her previous scan. Given this finding, recommend that we pursue 24-hour urine testing and she is open to doing this now. She is aware that her IUD is displaced on her scan and will discuss with OBGYN.  -Lithhever x1 and follow up with me in the coming months to review results.     Indu Farfan, CNP  Department of Urology      Subjective:   48 year old female who presents for follow up regarding kidney stones. She is s/p URS last summer for treatment of an 8 mm UPJ stone, composed predominantly of CaOx. I saw her post-op in 08/2022 and gen recs reviewed. Diet low in fluid and high in salt. Planned for 6 month follow up with imaging but she was somewhat lost to follow up.     She did complete a CT A/P earlier this month, with results showing a nonobstructive 4 mm right renal stone. Her IUD also noted to be out of position.     Objective:     Exam:   Patient is a 48 year old female   No vital signs obtained due to virtual visit.  General Appearance: Well groomed, hygenic  Respiratory: No cough, no respiratory distress or labored breathing  Musculoskeletal: Grossly normal with no gross deficits  Skin: No discoloration or apparent  rashes  Neurologic: No tremors  Psychiatric: Alert and oriented  Further examination is deferred due to the nature of our visit.

## 2023-11-17 NOTE — NURSING NOTE
Is the patient currently in the state of [place]? YES    Visit mode:VIDEO    If the visit is dropped, the patient can be reconnected by: VIDEO VISIT: Text to cell phone:   Telephone Information:   Mobile [phone]       Will anyone else be joining the visit? NO  (If patient encounters technical issues they should call [phone]    How would you like to obtain your AVS? MyChart    Are changes needed to the allergy or medication list? No    Reason for visit: RECHECK (CT follow up)    Nimisha PADILLA

## 2023-11-17 NOTE — PATIENT INSTRUCTIONS
UROLOGY CLINIC VISIT PATIENT INSTRUCTIONS    -Will pursue the 24-hour urine collection study.  This kit will be sent to your home address. Your at-home kit will include everything you need to complete your 24-hour urine collection. Detailed instructions, collection supplies, return shipping box, and a pre-paid Fed-Ex label are being sent directly to you.   -If you don't receive this kit in the next two weeks, let me know.   -Will schedule a follow up visit to review results in January, as discussed.     If you have any issues, questions or concerns in the meantime, do not hesitate to contact us at [phone] or via [employer].     Indu Farfan, CNP  Department of Urology

## 2023-11-18 ENCOUNTER — ANCILLARY PROCEDURE (OUTPATIENT)
Dept: GENERAL RADIOLOGY | Facility: CLINIC | Age: 48
End: 2023-11-18
Attending: PEDIATRICS
Payer: COMMERCIAL

## 2023-11-18 ENCOUNTER — OFFICE VISIT (OUTPATIENT)
Dept: ORTHOPEDICS | Facility: CLINIC | Age: 48
End: 2023-11-18
Payer: COMMERCIAL

## 2023-11-18 VITALS
HEART RATE: 71 BPM | DIASTOLIC BLOOD PRESSURE: 87 MMHG | HEIGHT: 64 IN | SYSTOLIC BLOOD PRESSURE: 122 MMHG | WEIGHT: 224 LBS | BODY MASS INDEX: 38.24 KG/M2

## 2023-11-18 DIAGNOSIS — M54.50 ACUTE LEFT-SIDED LOW BACK PAIN WITHOUT SCIATICA: ICD-10-CM

## 2023-11-18 DIAGNOSIS — M54.50 ACUTE LEFT-SIDED LOW BACK PAIN WITHOUT SCIATICA: Primary | ICD-10-CM

## 2023-11-18 PROCEDURE — 72100 X-RAY EXAM L-S SPINE 2/3 VWS: CPT | Mod: TC | Performed by: RADIOLOGY

## 2023-11-18 PROCEDURE — 99204 OFFICE O/P NEW MOD 45 MIN: CPT | Performed by: PEDIATRICS

## 2023-11-18 RX ORDER — CYCLOBENZAPRINE HCL 5 MG
5-10 TABLET ORAL 2 TIMES DAILY PRN
Qty: 30 TABLET | Refills: 0 | Status: SHIPPED | OUTPATIENT
Start: 2023-11-18 | End: 2024-04-22

## 2023-11-18 RX ORDER — METHYLPREDNISOLONE 4 MG
TABLET, DOSE PACK ORAL
Qty: 21 TABLET | Refills: 0 | Status: SHIPPED | OUTPATIENT
Start: 2023-11-18 | End: 2024-04-22

## 2023-11-18 ASSESSMENT — PAIN SCALES - GENERAL: PAINLEVEL: SEVERE PAIN (7)

## 2023-11-18 NOTE — PROGRESS NOTES
ASSESSMENT & PLAN    Gretchen was seen today for pain.    Diagnoses and all orders for this visit:    Acute left-sided low back pain without sciatica  -     XR Lumbar Spine 2-3 Views*; Future  -     methylPREDNISolone (MEDROL DOSEPAK) 4 MG tablet therapy pack; Follow Package Directions  -     cyclobenzaprine (FLEXERIL) 5 MG tablet; Take 1-2 tablets (5-10 mg) by mouth 2 times daily as needed for muscle spasms  -     Physical Therapy Referral; Future      This issue is acute and Unchanged.      ICD-10-CM    1. Acute left-sided low back pain without sciatica  M54.50 XR Lumbar Spine 2-3 Views*     methylPREDNISolone (MEDROL DOSEPAK) 4 MG tablet therapy pack     cyclobenzaprine (FLEXERIL) 5 MG tablet     Physical Therapy Referral          Discussed likely contributing factors to mechanical back pain SI joint pain and muscular pain.  Recommended physical therapy.  Would consider further work up pending clinical course.    Plan:  - Today's Plan of Care:  Prescription Medication as directed: Medrol Dose Pack and Cyclobenzaprine - do not take with other sedating medications (I reviewed the mechanism of action as well as risk/benefit profile of medications. Questions answered.)    Start with Home Exercise Program    Rehab: Physical Therapy: [facility] - [phone]    Discussed activity considerations and other supportive care including Ice/Heat, OTC and other topical medications as needed (active ingredients typically include lidocaine or menthol)    -We also discussed other future treatment options:  MRI if not improving    Follow Up: 6 - 8 weeks and as needed    Concerning signs and symptoms were reviewed and all questions were answered at this time.    Akanksha Villa MD [facility]  Sports Medicine Physician  [facility]    -----  Chief Complaint   Patient presents with    Lower Back - Pain       SUBJECTIVE  Jesicaaustin Mooney is a/an 48 year old female who is seen as a WALK IN patient for evaluation of  "acute low back pain.     The patient is seen by themselves.  The patient is Right handed    Onset: 2 day(s) ago. Patient describes injury as she was bent over th attach her dogs leash and she felt a pop in her low back, her back tightened up soon after  Location of Pain: left side low back, pain is also diffuse across the low back  Worsened by: walking, moving, twisting  Better with: pain is better at the end of the day  Treatments tried: rest/activity avoidance, heat, and Tylenol  Associated symptoms: no distal numbness or tingling; denies swelling or warmth    Orthopedic/Surgical history: NO  Social History/Occupation: central scheduling at [employer]      REVIEW OF SYSTEMS:  Review of Systems    OBJECTIVE:  /87   Pulse 71   Ht 1.613 m (5' 3.5\")   Wt 101.6 kg (224 lb)   BMI 39.06 kg/m     General: healthy, alert and in no distress  Skin: no suspicious lesions or rash.  CV: distal perfusion intact  Resp: normal respiratory effort without conversational dyspnea   Psych: normal mood and affect  Gait: NORMAL  Neuro: Normal light sensory exam of lower extremity    Low back exam:  Inspection:     no visible deformity in the low back       normal skin       normal vascular       normal lymphatic    Posture:      normal    Foot Inspection:     no deformity noted    Tender:     midline       paraspinal muscles    Non Tender:     remainder of lumbar spine    ROM:      limited flexion due to pain       limited extension due to pain    Strength:     hip flexion 5/5 bilateral       knee extension 5/5 bilateral       ankle dorsiflexion 5/5 bilateral       ankle plantarflexion 5/5 bilateral       dorsiflexion of the great toe 5/5 bilateral    Reflexes:     patellar (L3, L4) symmetric normal       achilles tendons (S1) symmetric normal    Sensation:    grossly intact throughout lower extremities    Special tests:      straight leg raise left negative        straight leg raise right negative       neg (-) DAKOTA  " bilateral       neg (-) FADIR  bilateral      RADIOLOGY:  Final results and radiologist's interpretation, available in the [facility] health record.  Images were reviewed with the patient in the office today.  My personal interpretation of the performed imagin XR views of lumbar spine reviewed: no acute bony abnormality, lower lumbar degenerative change  - will follow official read    Review of the result(s) of each unique test - XR

## 2023-11-18 NOTE — LETTER
11/18/2023         RE: Jesica Mooney  [address]  [place] [address]        Dear Colleague,    Thank you for referring your patient, Jesica Mooney, to the [facility] SPORTS MEDICINE CLINIC [place]. Please see a copy of my visit note below.    ASSESSMENT & PLAN    Gretchen was seen today for pain.    Diagnoses and all orders for this visit:    Acute left-sided low back pain without sciatica  -     XR Lumbar Spine 2-3 Views*; Future  -     methylPREDNISolone (MEDROL DOSEPAK) 4 MG tablet therapy pack; Follow Package Directions  -     cyclobenzaprine (FLEXERIL) 5 MG tablet; Take 1-2 tablets (5-10 mg) by mouth 2 times daily as needed for muscle spasms  -     Physical Therapy Referral; Future      This issue is acute and Unchanged.      ICD-10-CM    1. Acute left-sided low back pain without sciatica  M54.50 XR Lumbar Spine 2-3 Views*     methylPREDNISolone (MEDROL DOSEPAK) 4 MG tablet therapy pack     cyclobenzaprine (FLEXERIL) 5 MG tablet     Physical Therapy Referral          Discussed likely contributing factors to mechanical back pain SI joint pain and muscular pain.  Recommended physical therapy.  Would consider further work up pending clinical course.    Plan:  - Today's Plan of Care:  Prescription Medication as directed: Medrol Dose Pack and Cyclobenzaprine - do not take with other sedating medications (I reviewed the mechanism of action as well as risk/benefit profile of medications. Questions answered.)    Start with Home Exercise Program    Rehab: Physical Therapy: [facility] - [phone]    Discussed activity considerations and other supportive care including Ice/Heat, OTC and other topical medications as needed (active ingredients typically include lidocaine or menthol)    -We also discussed other future treatment options:  MRI if not improving    Follow Up: 6 - 8 weeks and as needed    Concerning signs and symptoms were reviewed and all questions were answered at this  "time.    Akanksha Villa MD [facility]  Sports Medicine Physician  [facility]    -----  Chief Complaint   Patient presents with     Lower Back - Pain       SUBJECTIVE  Jesica Mooney is a/an 48 year old female who is seen as a WALK IN patient for evaluation of acute low back pain.     The patient is seen by themselves.  The patient is Right handed    Onset: 2 day(s) ago. Patient describes injury as she was bent over th attach her dogs leash and she felt a pop in her low back, her back tightened up soon after  Location of Pain: left side low back, pain is also diffuse across the low back  Worsened by: walking, moving, twisting  Better with: pain is better at the end of the day  Treatments tried: rest/activity avoidance, heat, and Tylenol  Associated symptoms: no distal numbness or tingling; denies swelling or warmth    Orthopedic/Surgical history: NO  Social History/Occupation: central scheduling at [employer]      REVIEW OF SYSTEMS:  Review of Systems    OBJECTIVE:  /87   Pulse 71   Ht 1.613 m (5' 3.5\")   Wt 101.6 kg (224 lb)   BMI 39.06 kg/m     General: healthy, alert and in no distress  Skin: no suspicious lesions or rash.  CV: distal perfusion intact  Resp: normal respiratory effort without conversational dyspnea   Psych: normal mood and affect  Gait: NORMAL  Neuro: Normal light sensory exam of lower extremity    Low back exam:  Inspection:     no visible deformity in the low back       normal skin       normal vascular       normal lymphatic    Posture:      normal    Foot Inspection:     no deformity noted    Tender:     midline       paraspinal muscles    Non Tender:     remainder of lumbar spine    ROM:      limited flexion due to pain       limited extension due to pain    Strength:     hip flexion 5/5 bilateral       knee extension 5/5 bilateral       ankle dorsiflexion 5/5 bilateral       ankle plantarflexion 5/5 bilateral       dorsiflexion of the great toe 5/5 " bilateral    Reflexes:     patellar (L3, L4) symmetric normal       achilles tendons (S1) symmetric normal    Sensation:    grossly intact throughout lower extremities    Special tests:      straight leg raise left negative        straight leg raise right negative       neg (-) DAKOTA  bilateral       neg (-) FADIR  bilateral      RADIOLOGY:  Final results and radiologist's interpretation, available in the [facility] health record.  Images were reviewed with the patient in the office today.  My personal interpretation of the performed imagin XR views of lumbar spine reviewed: no acute bony abnormality, lower lumbar degenerative change  - will follow official read    Review of the result(s) of each unique test - XR             Again, thank you for allowing me to participate in the care of your patient.        Sincerely,        Akanksha Villa MD

## 2023-11-18 NOTE — PATIENT INSTRUCTIONS
Discussed likely contributing factors to mechanical back pain SI joint pain and muscular pain.  Recommended physical therapy.  Would consider further work up pending clinical course.    Plan:  - Today's Plan of Care:  Prescription Medication as directed: Medrol Dose Pack and Cyclobenzaprine - do not take with other sedating medications (I reviewed the mechanism of action as well as risk/benefit profile of medications. Questions answered.)    Start with Home Exercise Program    Rehab: Physical Therapy: [facility] - [phone]    Discussed activity considerations and other supportive care including Ice/Heat, OTC and other topical medications as needed (active ingredients typically include lidocaine or menthol)    -We also discussed other future treatment options:  MRI if not improving    Follow Up: 6 - 8 weeks and as needed    If you have any further questions for your physician or physician s care team you can call [phone] and use option 3 to leave a voice message.

## 2023-11-22 ENCOUNTER — THERAPY VISIT (OUTPATIENT)
Dept: PHYSICAL THERAPY | Facility: CLINIC | Age: 48
End: 2023-11-22
Payer: COMMERCIAL

## 2023-11-22 ENCOUNTER — TELEPHONE (OUTPATIENT)
Dept: UROLOGY | Facility: CLINIC | Age: 48
End: 2023-11-22

## 2023-11-22 DIAGNOSIS — M54.50 ACUTE LEFT-SIDED LOW BACK PAIN WITHOUT SCIATICA: Primary | ICD-10-CM

## 2023-11-22 PROCEDURE — 97161 PT EVAL LOW COMPLEX 20 MIN: CPT | Mod: GP

## 2023-11-22 PROCEDURE — 97110 THERAPEUTIC EXERCISES: CPT | Mod: GP

## 2023-11-22 NOTE — PROGRESS NOTES
PHYSICAL THERAPY EVALUATION  Type of Visit: Evaluation    See electronic medical record for Abuse and Falls Screening details.    Subjective   Patient presents w/ acute L sided LBP after being bent forward and turning to the L to grab a dogs leash - felt a popping sensation in the low back but it wasn't immediately painful until the next day. Hx of occasional low back pain, but nothing lately. Moving between positions is the most painful, she is moving very slow and with intention and reports fear of damaging tissue by moving certain ways.       Presenting condition or subjective complaint:  injured low back  Date of onset: 11/18/23    Relevant medical history:   implanted device, overweight  Dates & types of surgery:  kidney stone 2022    Prior diagnostic imaging/testing results:     x-ray, mild scoliosis and degenerative changes  Prior therapy history for the same diagnosis, illness or injury:    no    Living Environment  Social support:   family members  Type of home:   2 story  Stairs to enter the home:       yes, 8  Ramp:   no  Stairs inside the home:       yes, 14  Help at home:  self cares, none  Equipment owned:       Employment:    yes, [employer] central scheduler   Hobbies/Interests:  reading, TV, walking, scrapbooking    Patient goals for therapy:  everything! Tie shoes, bend, etc.     Pain assessment: Pain present  See objective evaluation for additional pain details     Objective   LUMBAR SPINE EVALUATION  PAIN: Pain Level at Rest: 3/10  Pain is Exacerbated By: longer duration activities, bending forward and tieing shoes, very stiff in the morning  Pain is Relieved By: flexeril and prednisone, not moving  Pain is most prominent when transitioning between positions and moving, eases with stopping movement and staying still. Observable fear of movement, and pt reports she is fearful of damaging tissues when bending forward.    POSTURE: WNL  GAIT:   Weightbearing Status: WBAT  Assistive Device(s):  None  Gait Deviations: Antalgic  Base of support decreased  Bibi decreased  ROM:  flexion to ankles, but fearful of movement and slow with movement; extension slightly past neutral, pain in L PSIS; sidebend WNL; sideglide WNL   PELVIC/SI SCREEN:  - Leg length, - compression, - distraction   NEURAL TENSION:  - Slump B  FLEXIBILITY:  90/90 WNL, B SLR WNL; B piriformis WNL.   LUMBAR/HIP Special Tests:  -DAKOTA    PALPATION:  painful and tender L PSIS, decreased PA mobility at TL junction, decreased lumbar/lower thoracic rotation to the L. L paraspinals increased tone.     Assessment & Plan   CLINICAL IMPRESSIONS  Medical Diagnosis: Acute left-sided low back pain without sciatica    Treatment Diagnosis: L sided LBP w/out radiating pain   Impression/Assessment: Patient is a 48 year old female with acute L low back pain complaints.  The following significant findings have been identified: Pain, Decreased joint mobility, Decreased strength, Impaired gait, and Decreased activity tolerance. These impairments interfere with their ability to perform recreational activities, household chores, household mobility, and community mobility as compared to previous level of function.     Clinical Decision Making (Complexity):  Clinical Presentation: Stable/Uncomplicated  Clinical Presentation Rationale: based on medical and personal factors listed in PT evaluation  Clinical Decision Making (Complexity): Low complexity    PLAN OF CARE  Treatment Interventions:  Interventions: Manual Therapy, Neuromuscular Re-education, Therapeutic Activity, Therapeutic Exercise    Long Term Goals     PT Goal 1  Goal Identifier: walking  Goal Description: Patient will be able to walk .5mi w/ no pain in the back w/ normal walking speed (~3 to 4mph).  Rationale: to maximize safety and independence within the community  Target Date: 01/03/24  PT Goal 2  Goal Identifier: rolling in bed  Goal Description: patient will be able to roll over in bed w/ no  increase in pain > 2/10 and w/out fear of movement  Rationale: to maximize safety and independence with performance of ADLs and functional tasks;to maximize safety and independence with self cares  Target Date: 01/03/24      Frequency of Treatment: 1x/week  Duration of Treatment: 6 weeks    Recommended Referrals to Other Professionals:  none  Education Assessment:   Learner/Method: Patient    Risks and benefits of evaluation/treatment have been explained.   Patient/Family/caregiver agrees with Plan of Care.     Evaluation Time:     PT Eval, Low Complexity Minutes (33153): 25       Signing Clinician: Casie Castro, SPT and Kyaw Echevarria PT

## 2023-11-27 ENCOUNTER — MEDICAL CORRESPONDENCE (OUTPATIENT)
Dept: HEALTH INFORMATION MANAGEMENT | Facility: CLINIC | Age: 48
End: 2023-11-27
Payer: COMMERCIAL

## 2023-12-08 ENCOUNTER — THERAPY VISIT (OUTPATIENT)
Dept: PHYSICAL THERAPY | Facility: CLINIC | Age: 48
End: 2023-12-08
Attending: PEDIATRICS
Payer: COMMERCIAL

## 2023-12-08 DIAGNOSIS — M54.50 ACUTE LEFT-SIDED LOW BACK PAIN WITHOUT SCIATICA: ICD-10-CM

## 2023-12-08 PROCEDURE — 97110 THERAPEUTIC EXERCISES: CPT | Mod: 59

## 2023-12-08 PROCEDURE — 97530 THERAPEUTIC ACTIVITIES: CPT | Mod: GP

## 2023-12-12 ENCOUNTER — DOCUMENTATION ONLY (OUTPATIENT)
Dept: HEMATOLOGY | Facility: CLINIC | Age: 48
End: 2023-12-12
Payer: COMMERCIAL

## 2023-12-12 NOTE — PROGRESS NOTES
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 Patient was contacted, voicemail left, and [employer] message sent. Genetic counseling was offered. Awaiting patient call/message back. This does not change her management plan which is to continue on long term anticoagulation with Xarelto 20mg once daily.           ANGI RUDD PA-C on 12/12/2023 at 9:15 AM

## 2024-01-03 ENCOUNTER — TRANSFERRED RECORDS (OUTPATIENT)
Dept: HEALTH INFORMATION MANAGEMENT | Facility: CLINIC | Age: 49
End: 2024-01-03
Payer: COMMERCIAL

## 2024-01-12 ENCOUNTER — PRE VISIT (OUTPATIENT)
Dept: UROLOGY | Facility: CLINIC | Age: 49
End: 2024-01-12

## 2024-01-22 NOTE — PROGRESS NOTES
Video start time: 10:01 AM  Video end time: 10:25 AM    CC: Kidney stone follow up    Assessment/Plan:  48 year old female with a history of CaOx kidney stones, with a 4 mm stone in her right kidney per CT from a few months ago. Completed her first Litholink and results reviewed today, showing suboptimal urine volume, borderline hypercalciuria, high urine pH, and mild CaOx/high CaP stone risks. Advised her to work on drinking a bit more water daily, and also try to reduce her sodium a bit. Dietary sodium calculated to be ~3000 mg the day she did this test, which in honesty is not bad, though tighter control down to 4114-9551 mg would be helpful. She will continue to work on this.  -Will see her back in 6 months with a KUB x-ray and repeat Litholink completed beforehand.    Indu Farfan, CNP  Department of Urology      Subjective:   48 year old female who presents for follow up regarding kidney stones. She is s/p URS in 07/2022, with stones composed of CaOx. 4 mm right renal stone on CT obtained in November, which appeared to be new from her previous imaging. Given this finding, recommend we pursue 24-hour urine collection testing to help guide prevention.     She completed Litholink a few weeks ago, with results showing suboptimal urine volume, borderline hypercalciuria, high urine pH, and mild CaOx/high CaP stone risks, per below:         Today, she states that she has continued to do well since our last visit. Has been working on hydration and trying to keep her sodium low, thought she admits this has been a struggle.     Objective:     Exam:   Patient is a 48 year old female   No vital signs obtained due to virtual visit.  General Appearance: Well groomed, hygenic  Respiratory: No cough, no respiratory distress or labored breathing  Musculoskeletal: Grossly normal with no gross deficits  Skin: No discoloration or apparent rashes  Neurologic: No tremors  Psychiatric: Alert and oriented  Further examination is  deferred due to the nature of our visit.

## 2024-01-23 ENCOUNTER — TELEPHONE (OUTPATIENT)
Dept: UROLOGY | Facility: CLINIC | Age: 49
End: 2024-01-23

## 2024-01-23 ENCOUNTER — VIRTUAL VISIT (OUTPATIENT)
Dept: UROLOGY | Facility: CLINIC | Age: 49
End: 2024-01-23
Payer: COMMERCIAL

## 2024-01-23 DIAGNOSIS — N20.0 KIDNEY STONE: Primary | ICD-10-CM

## 2024-01-23 PROCEDURE — 99213 OFFICE O/P EST LOW 20 MIN: CPT | Mod: 95 | Performed by: NURSE PRACTITIONER

## 2024-01-23 ASSESSMENT — PAIN SCALES - GENERAL: PAINLEVEL: NO PAIN (0)

## 2024-01-23 NOTE — LETTER
1/23/2024       RE: Jesica Mooney  [address]  [place] [address]     Dear Colleague,    Thank you for referring your patient, Jesica Mooeny, to the [facility] UROLOGY CLINIC [place] at [facility]. Please see a copy of my visit note below.    Video start time: 10:01 AM  Video end time: 10:25 AM    CC: Kidney stone follow up    Assessment/Plan:  48 year old female with a history of CaOx kidney stones, with a 4 mm stone in her right kidney per CT from a few months ago. Completed her first Litholink and results reviewed today, showing suboptimal urine volume, borderline hypercalciuria, high urine pH, and mild CaOx/high CaP stone risks. Advised her to work on drinking a bit more water daily, and also try to reduce her sodium a bit. Dietary sodium calculated to be ~3000 mg the day she did this test, which in honesty is not bad, though tighter control down to 1758-6142 mg would be helpful. She will continue to work on this.  -Will see her back in 6 months with a KUB x-ray and repeat Litholink completed beforehand.    Idnu Farfan, CNP  Department of Urology      Subjective:   48 year old female who presents for follow up regarding kidney stones. She is s/p URS in 07/2022, with stones composed of CaOx. 4 mm right renal stone on CT obtained in November, which appeared to be new from her previous imaging. Given this finding, recommend we pursue 24-hour urine collection testing to help guide prevention.     She completed Litholink a few weeks ago, with results showing suboptimal urine volume, borderline hypercalciuria, high urine pH, and mild CaOx/high CaP stone risks, per below:         Today, she states that she has continued to do well since our last visit. Has been working on hydration and trying to keep her sodium low, thought she admits this has been a struggle.     Objective:     Exam:   Patient is a 48 year old female   No vital signs  obtained due to virtual visit.  General Appearance: Well groomed, hygenic  Respiratory: No cough, no respiratory distress or labored breathing  Musculoskeletal: Grossly normal with no gross deficits  Skin: No discoloration or apparent rashes  Neurologic: No tremors  Psychiatric: Alert and oriented  Further examination is deferred due to the nature of our visit.

## 2024-01-23 NOTE — NURSING NOTE
Is the patient currently in the state of [place]? YES    Visit mode:VIDEO    If the visit is dropped, the patient can be reconnected by: VIDEO VISIT: Text to cell phone:   Telephone Information:   Mobile [phone]       Will anyone else be joining the visit? NO  (If patient encounters technical issues they should call [phone]    How would you like to obtain your AVS? MyChart    Are changes needed to the allergy or medication list? No    Reason for visit: BARRETT PADILLA

## 2024-01-23 NOTE — PATIENT INSTRUCTIONS
UROLOGY CLINIC VISIT PATIENT INSTRUCTIONS    -Continue to work on hydration and keeping your sodium level low.   -Follow up with me in 6 months with a KUB x-ray and repeat 24-hour collection repeated beforehand.    If you have any issues, questions or concerns in the meantime, do not hesitate to contact us at [phone] or via [employer].     Indu Farfan, CNP  Department of Urology

## 2024-01-25 ENCOUNTER — MEDICAL CORRESPONDENCE (OUTPATIENT)
Dept: HEALTH INFORMATION MANAGEMENT | Facility: CLINIC | Age: 49
End: 2024-01-25
Payer: COMMERCIAL

## 2024-02-17 ENCOUNTER — HEALTH MAINTENANCE LETTER (OUTPATIENT)
Age: 49
End: 2024-02-17

## 2024-04-22 ENCOUNTER — OFFICE VISIT (OUTPATIENT)
Dept: OPTOMETRY | Facility: CLINIC | Age: 49
End: 2024-04-22
Payer: COMMERCIAL

## 2024-04-22 DIAGNOSIS — H52.223 REGULAR ASTIGMATISM OF BOTH EYES: ICD-10-CM

## 2024-04-22 DIAGNOSIS — H52.4 PRESBYOPIA: ICD-10-CM

## 2024-04-22 DIAGNOSIS — H52.13 MYOPIA OF BOTH EYES: ICD-10-CM

## 2024-04-22 DIAGNOSIS — H04.123 DRY EYES: ICD-10-CM

## 2024-04-22 DIAGNOSIS — Z01.00 ROUTINE EYE EXAM: Primary | ICD-10-CM

## 2024-04-22 PROCEDURE — 92015 DETERMINE REFRACTIVE STATE: CPT | Performed by: OPTOMETRIST

## 2024-04-22 PROCEDURE — 92014 COMPRE OPH EXAM EST PT 1/>: CPT | Performed by: OPTOMETRIST

## 2024-04-22 ASSESSMENT — VISUAL ACUITY
OD_CC+: -2
OD_CC: 20/20
OS_CC: 20/20
OS_CC: 20/25-1
OD_CC: 20/20
OS_CC+: -2
METHOD: SNELLEN - LINEAR
CORRECTION_TYPE: GLASSES

## 2024-04-22 ASSESSMENT — REFRACTION_MANIFEST
OD_AXIS: 072
OD_ADD: +2.25
OS_CYLINDER: +1.50
OS_SPHERE: -10.50
OD_SPHERE: -9.75
OS_AXIS: 075
OD_AXIS: 060
OS_ADD: +2.25
OS_SPHERE: -10.00
METHOD_AUTOREFRACTION: 1
OS_AXIS: 075
OD_CYLINDER: +1.75
OS_CYLINDER: +2.25
OD_CYLINDER: +1.00
OD_SPHERE: -10.75

## 2024-04-22 ASSESSMENT — REFRACTION_WEARINGRX
OS_AXIS: 075
OD_CYLINDER: +1.00
OD_SPHERE: -10.00
OS_SPHERE: -9.75
OD_ADD: +1.50
OD_AXIS: 057
OS_CYLINDER: +1.25
SPECS_TYPE: PAL
OS_ADD: +1.50

## 2024-04-22 ASSESSMENT — KERATOMETRY
OD_K1POWER_DIOPTERS: 45.00
OS_K2POWER_DIOPTERS: 47.25
OS_K1POWER_DIOPTERS: 45.25
OD_AXISANGLE2_DEGREES: 166
OS_AXISANGLE2_DEGREES: 167
OD_K2POWER_DIOPTERS: 46.75

## 2024-04-22 ASSESSMENT — CONF VISUAL FIELD
OS_SUPERIOR_TEMPORAL_RESTRICTION: 0
OD_SUPERIOR_NASAL_RESTRICTION: 0
OD_INFERIOR_TEMPORAL_RESTRICTION: 0
OS_NORMAL: 1
OS_SUPERIOR_NASAL_RESTRICTION: 0
OS_INFERIOR_NASAL_RESTRICTION: 0
OD_NORMAL: 1
METHOD: COUNTING FINGERS
OD_SUPERIOR_TEMPORAL_RESTRICTION: 0
OS_INFERIOR_TEMPORAL_RESTRICTION: 0
OD_INFERIOR_NASAL_RESTRICTION: 0

## 2024-04-22 ASSESSMENT — EXTERNAL EXAM - RIGHT EYE: OD_EXAM: NORMAL

## 2024-04-22 ASSESSMENT — TONOMETRY
OD_IOP_MMHG: 11
IOP_METHOD: APPLANATION
OS_IOP_MMHG: 11

## 2024-04-22 ASSESSMENT — CUP TO DISC RATIO
OS_RATIO: 0.2
OD_RATIO: 0.2

## 2024-04-22 ASSESSMENT — SLIT LAMP EXAM - LIDS
COMMENTS: MEIBOMIAN GLAND DYSFUNCTION
COMMENTS: MEIBOMIAN GLAND DYSFUNCTION

## 2024-04-22 ASSESSMENT — EXTERNAL EXAM - LEFT EYE: OS_EXAM: NORMAL

## 2024-04-22 NOTE — PROGRESS NOTES
[facility]  [address] [place] [address] [address], [place] [address]  Main: [phone], Fax: [phone]      Video Virtual Visit Note:    Patient: Jesica Mooney  MRN: [id number]  : 1975  OWEN: 2024      Due to the ongoing COVID-19 outbreak, this visit was conducted by video, with the patient's approval.      Reason of today's visit:  Follow up, history of antithrombin deficiency and venous thromboembolism, on long term anticoagulation     Clinical History Summary:  Jesica Mooney is a 48 year old female with past medical history significant for pulmonary embolism while on chronic (>20 years) estrogen containing oral contraceptive pills in 2015. She was started on Xarelto and her OCPs were discontinued. She ultimately ended up undergoing a hypercoagulable workup that showed high-titer positive JAN, persistently elevated D-dimer around 2.5 and persistent mildly reduced levels of antithrombin III (70s). Her lupus inhibitor, anticardiolipin antibodies, beta-2-glycoprotein antibodies, protein C and S levels, factor V Leiden and prothrombin gene mutation testing was negative. She was seen by Rheumatology and diagnosed with Sjogren's. She did have gene sequencing for antithrombin III deficiency and had VUS of SERPINC1 gene likely to be pathogenic in terms of clotting risk thus she was recommended to continue on long term anticoagulation for secondary prevention of venous thromboembolism. She has been maintained on Xarelto 20mg once daily.      Within the last year, her mutation was reclassified to pathogenic. She was informed of these results. It did not change her anticoagulation plan.     She has had nephrolithiasis. No planned surgeries at present.    Interim History:  Today, Gretchen notes that she is doing well. She notes no major life events this last year and no major changes to medications. She has been tolerating Xarelto well without side  effects. She denies any bleeding symptoms. No concerns about recurrent venous thromboembolism. Cost is acceptable with the copay program. She has no planned surgeries or procedures.     ROS:  Denies any bleeding complications. Specifically, no frequent epistaxis. No issues with oral mucosal bleeding. Denies any hematuria or blood in stools. Denies any shortness of breath. No chest pain. No cough. No fever.      Medications:   Current Outpatient Medications   Medication Sig Dispense Refill    cevimeline (EVOXAC) 30 MG capsule Take 1 capsule (30 mg) by mouth 2 times daily 180 capsule 3    levonorgestrel (MIRENA) 20 MCG/DAY IUD 1 each by Intrauterine route once      polyethylene glycol-propylene glycol (SYSTANE ULTRA) 0.4-0.3 % SOLN ophthalmic solution Place 1 drop into both eyes 2 times daily as needed for dry eyes      rivaroxaban ANTICOAGULANT (XARELTO ANTICOAGULANT) 20 MG TABS tablet TAKE 1 TABLET(20 MG) BY MOUTH DAILY WITH DINNER 90 tablet 3        Allergies:      Allergies   Allergen Reactions    Keflex [Cephalexin Hcl] Hives    Penicillins Hives    Betadine [Povidone Iodine] Itching     Possibly itchiness, no hives, uncertain allergic reaction.       PMH:   Past Medical History:   Diagnosis Date    IUD (intrauterine device) in place     Mirena inserted 12/28/15    Obese        Social History:   Social History     Tobacco Use    Smoking status: Never    Smokeless tobacco: Never    Tobacco comments:     Lives in smoke free household   Substance Use Topics    Alcohol use: No     Alcohol/week: 0.0 standard drinks of alcohol    Drug use: No       Family History:  Denies any family histroy of venous thromboembolism   Son and daughter have not had testing to Gretchen's knowledge but they are aware.     Objective:  Visual Examination via Video:  Pleasant in no acute distress.  Normal work of breathing   A+O x 3    Labs:  CBC RESULTS:   Recent Labs   Lab Test 10/16/23  0744   WBC 6.5   RBC 5.03   HGB 15.5   HCT 45.6   MCV  91   MCH 30.8   MCHC 34.0   RDW 12.3        Last Comprehensive Metabolic Panel:  Sodium   Date Value Ref Range Status   10/16/2023 137 135 - 145 mmol/L Final     Comment:     Reference intervals for this test were updated on 09/26/2023 to more accurately reflect our healthy population. There may be differences in the flagging of prior results with similar values performed with this method. Interpretation of those prior results can be made in the context of the updated reference intervals.    11/23/2020 138 133 - 144 mmol/L Final     Potassium   Date Value Ref Range Status   10/16/2023 4.4 3.4 - 5.3 mmol/L Final   11/05/2022 4.2 3.4 - 5.3 mmol/L Final   11/23/2020 4.0 3.4 - 5.3 mmol/L Final     Chloride   Date Value Ref Range Status   10/16/2023 104 98 - 107 mmol/L Final   11/05/2022 109 94 - 109 mmol/L Final   11/23/2020 106 94 - 109 mmol/L Final     Carbon Dioxide   Date Value Ref Range Status   11/23/2020 29 20 - 32 mmol/L Final     Carbon Dioxide (CO2)   Date Value Ref Range Status   10/16/2023 25 22 - 29 mmol/L Final   11/05/2022 28 20 - 32 mmol/L Final     Anion Gap   Date Value Ref Range Status   10/16/2023 8 7 - 15 mmol/L Final   11/05/2022 4 3 - 14 mmol/L Final   11/23/2020 3 3 - 14 mmol/L Final     Glucose   Date Value Ref Range Status   10/16/2023 96 70 - 99 mg/dL Final   11/05/2022 90 70 - 99 mg/dL Final   11/23/2020 89 70 - 99 mg/dL Final     Comment:     Non Fasting     Urea Nitrogen   Date Value Ref Range Status   10/16/2023 9.0 6.0 - 20.0 mg/dL Final   11/05/2022 11 7 - 30 mg/dL Final   11/23/2020 14 7 - 30 mg/dL Final     Creatinine   Date Value Ref Range Status   10/16/2023 0.85 0.51 - 0.95 mg/dL Final   11/23/2020 0.84 0.52 - 1.04 mg/dL Final     GFR Estimate   Date Value Ref Range Status   10/16/2023 84 >60 mL/min/1.73m2 Final   11/23/2020 83 >60 mL/min/[1.73_m2] Final     Comment:     Non  GFR Calc  Starting 12/18/2018, serum creatinine based estimated GFR (eGFR) will be    calculated using the Chronic Kidney Disease Epidemiology Collaboration   (CKD-EPI) equation.       Calcium   Date Value Ref Range Status   10/16/2023 9.7 8.6 - 10.0 mg/dL Final   11/23/2020 9.0 8.5 - 10.1 mg/dL Final     Liver Function Studies -   Recent Labs   Lab Test 10/16/23  0744   PROTTOTAL 7.7   ALBUMIN 4.2   BILITOTAL 0.5   ALKPHOS 58   AST 24   ALT 20         Imaging:  No results found for this or any previous visit (from the past 744 hour(s)).     Assessment:  In summary, Jesica Mooney is a 48 year old female with:     1. History of pulmonary embolism 12/2015 - on long term OCPs since discontinued. Remains on Xarelto 20mg once daily.   2. Antithrombin III deficiency, with pathogenic SERPINC gene mutation   3. Sjogren's followed by rheumatology   4. Nephrolithiasis    Plan:  Gretchen remains a good candidate for long term anticoagulation with Xarelto. I will refill her prescription for the next year. She is on savings card program.   Annual CBC and CMP. She typically has these done at her rheumatology appointments.  She will call if she has any planned surgeries or procedures.         Video-Visit Details:  Type of service:  Video Visit  Joined the call at 4/24/2024, 1:58:29 pm.  Left the call at 4/24/2024, 2:10:36 pm.  You were on the call for 12 minutes 7 seconds .  Originating Location (pt. Location): Home  Distant Location (provider location):  [facility]   Mode of Communication:  Video Conference via Alexandre Trejo, MPH, PA-C  [facility]    20 minutes spent by me on the date of the encounter doing chart review, review of test results, interpretation of tests, patient visit, and documentation

## 2024-04-22 NOTE — LETTER
4/22/2024         RE: Jesica Mooney  [address]  [place] [address]        Dear Colleague,    Thank you for referring your patient, Jesica Mooney, to the [facility]. Please see a copy of my visit note below.    Chief Complaint   Patient presents with     COMPREHENSIVE EYE EXAM         Last Eye Exam: 6/5/23  Dilated Previously: Yes    What are you currently using to see?  Glasses, gave up on the contacts        Distance Vision Acuity: Noticed gradual change in both eyes, feels like it's time to update glasses     Near Vision Acuity: Satisfied with vision while reading and using computer with glasses. Sometimes looks over her glasses     Eye Comfort: good. Has questions about stick on lashes and tattoo eyeliner - in terms of safety    Do you use eye drops? : Yes: Uses Systane drops twice a day  , uses nonpreserved artificial tear in vials    Occupation or Hobbies: Ortho Scheduling Lead       Liudmila Tariq Optometric Assistant           Medical, surgical and family histories reviewed and updated 4/22/2024.        No history of eye surgery    No family history of glaucoma or macular degeneration       OBJECTIVE: See Ophthalmology exam    ASSESSMENT:    ICD-10-CM    1. Routine eye exam  Z01.00       2. Myopia of both eyes  H52.13       3. Regular astigmatism of both eyes  H52.223       4. Presbyopia  H52.4       5. Dry eyes  H04.123           PLAN:   Advised against tattoo eyeliner, occasional use of false lashes is ok as long as they do not cause discomfort   Patient Instructions   Optional to fill new glasses prescription, minimal change   Continue use of warm compress and Systane Balance   Use Systane Complete PF, Systane Hydration PF or Refresh Relieva PF , iVIZA  as frequently as needed.  Return in 1 year for eye exam    Minnie Akers, OD  [phone]                  Again, thank you for allowing me to participate in the care of your patient.         Sincerely,        Minnie Akers, OD

## 2024-04-22 NOTE — PATIENT INSTRUCTIONS
Optional to fill new glasses prescription, minimal change   Continue use of warm compress and Systane Balance   Use Systane Complete PF, Systane Hydration PF or Refresh Relieva PF , iVIZA  as frequently as needed.  Return in 1 year for eye exam    Minnie Akers, OD  [phone]

## 2024-04-22 NOTE — PROGRESS NOTES
Chief Complaint   Patient presents with    COMPREHENSIVE EYE EXAM         Last Eye Exam: 6/5/23  Dilated Previously: Yes    What are you currently using to see?  Glasses, gave up on the contacts        Distance Vision Acuity: Noticed gradual change in both eyes, feels like it's time to update glasses     Near Vision Acuity: Satisfied with vision while reading and using computer with glasses. Sometimes looks over her glasses     Eye Comfort: good. Has questions about stick on lashes and tattoo eyeliner - in terms of safety    Do you use eye drops? : Yes: Uses Systane drops twice a day  , uses nonpreserved artificial tear in vials    Occupation or Hobbies: Ortho Scheduling Lead       Liudmila Apple Optometric Assistant           Medical, surgical and family histories reviewed and updated 4/22/2024.        No history of eye surgery    No family history of glaucoma or macular degeneration       OBJECTIVE: See Ophthalmology exam    ASSESSMENT:    ICD-10-CM    1. Routine eye exam  Z01.00       2. Myopia of both eyes  H52.13       3. Regular astigmatism of both eyes  H52.223       4. Presbyopia  H52.4       5. Dry eyes  H04.123           PLAN:   Advised against tattoo eyeliner, occasional use of false lashes is ok as long as they do not cause discomfort   Patient Instructions   Optional to fill new glasses prescription, minimal change   Continue use of warm compress and Systane Balance   Use Systane Complete PF, Systane Hydration PF or Refresh Relieva PF , iVIZA  as frequently as needed.  Return in 1 year for eye exam    Minnie Akers, OD  [phone]

## 2024-04-24 ENCOUNTER — VIRTUAL VISIT (OUTPATIENT)
Dept: HEMATOLOGY | Facility: CLINIC | Age: 49
End: 2024-04-24
Attending: PHYSICIAN ASSISTANT
Payer: COMMERCIAL

## 2024-04-24 DIAGNOSIS — Z79.01 CHRONIC ANTICOAGULATION: ICD-10-CM

## 2024-04-24 DIAGNOSIS — Z86.711 HISTORY OF PULMONARY EMBOLISM: ICD-10-CM

## 2024-04-24 DIAGNOSIS — D68.59 ANTITHROMBIN DEFICIENCY (H): Primary | ICD-10-CM

## 2024-04-24 PROCEDURE — 99213 OFFICE O/P EST LOW 20 MIN: CPT | Mod: 95 | Performed by: PHYSICIAN ASSISTANT

## 2024-04-24 NOTE — PATIENT INSTRUCTIONS
[facility]  [facility]  [facility], [address], [place] [address]  Main: [phone], Fax: [phone]    Jesica,   It was a pleasure seeing you today.  Thank you for allowing us to be involved in your care.  Please let us know if there is anything else we can do for you, so that we can be sure you are leaving completely satisfied with your care experience. Below is the plan that we discussed.     Continue Xarelto 20mg once daily with food.   Call if any concerns about new clots or if you have any bleeding issues.   Call if you have any planned surgeries or procedures scheduled. We typically hold Xarelto x 48 hours prior to surgeries and resume within 24 hours after.       We would like a provider on our team to see you at least annually for optimal care and to allow us to continue to prescribe for you.        Return to clinic in  in one year.    If you have questions or concerns, please don't hesitate to send a [employer] Message for non urgent matters or contact my nurse clinician, Sean. His number is [phone]. If they are unavailable and you have immediate concerns, please call [phone] and ask for a nurse.     Brittni Trejo, MPH, PA-C  [facility]

## 2024-04-24 NOTE — PROGRESS NOTES
Patient was contacted to complete the pre-visit call prior to their telephone visit with the provider.     Allergies and medications were reviewed.     I thanked them for their time to cover this information.     Arcelia Vasquez MA

## 2024-05-01 ENCOUNTER — MYC MEDICAL ADVICE (OUTPATIENT)
Dept: UROLOGY | Facility: CLINIC | Age: 49
End: 2024-05-01
Payer: COMMERCIAL

## 2024-05-01 DIAGNOSIS — N20.0 KIDNEY STONE: Primary | ICD-10-CM

## 2024-05-09 ENCOUNTER — ANCILLARY PROCEDURE (OUTPATIENT)
Dept: CT IMAGING | Facility: CLINIC | Age: 49
End: 2024-05-09
Attending: NURSE PRACTITIONER
Payer: COMMERCIAL

## 2024-05-09 DIAGNOSIS — N20.0 KIDNEY STONE: ICD-10-CM

## 2024-05-09 PROCEDURE — 74176 CT ABD & PELVIS W/O CONTRAST: CPT | Mod: TC | Performed by: RADIOLOGY

## 2024-05-13 NOTE — TELEPHONE ENCOUNTER
Spoke with patient regarding ct results and updated her regarding the IUD and to follow up with pcp.  Patient has appointment to discuss her ct and stones in July.  Peri Reyes RN

## 2024-06-30 NOTE — PROGRESS NOTES
Chief Complaint - left parotid cyst    History of Present Illness - Jesica Mooney is a 48 year old female who returns with a left parotid cyst. It has been present since 11/2020. She had one episode of painful parotid or cheek swelling about 10 years ago too. She has a h/o Sjogren's, diagnosis 6/2016, but she has had symptoms prior to that. CT neck 12/10/2020 images reviewed showing a left parotid cystic mass measuring approximately 2.7 x 2.4 x 2.5 cm. No lymphadenopathy. I performed a core needle biopsy on this (12/2020). It was negative and she had a lot of fluid that poured out at the time of biopsy.  I carefully reviewed her CT neck and her ultrasound images.  This information in combination with a very thin copious fluid collection on core needle biopsy leads me to believe that this is a simple cyst, a lymphoepithelial cyst, or sialocele.  She has Sjogren's and the anatomy of her parotid gland on the CT suggests Sjogren's with a cyst.  I see no adjacent nodule or mass suggesting malignancy or other tumor. The cyst enlarges intermittently, and I have had to drain this about every 6 months at one point, but last saw her 10/2022. I drained this 12/2020, 7/2021, 12/2021, 6/9/22, and 10/2022.    She returns and notes slowly worsening left parotid swelling at her cyst site. No infection or pain. No redness. No numbness. No other lumps or bumps. no facial weakness. She would like the cyst aspirated again.    Past Medical History -   Patient Active Problem List   Diagnosis    BMI 40.0-44.9, adult (H)    CARDIOVASCULAR SCREENING; LDL GOAL LESS THAN 160    Other acute pulmonary embolism    Sjogren's syndrome (H24)    Screening for cervical cancer    Obstruction of left ureteropelvic junction due to stone    Acute left-sided low back pain without sciatica       Current Medications -   Current Outpatient Medications:     cevimeline (EVOXAC) 30 MG capsule, Take 1 capsule (30 mg) by mouth 2 times daily, Disp: 180  capsule, Rfl: 3    levonorgestrel (MIRENA) 20 MCG/DAY IUD, 1 each by Intrauterine route once, Disp: , Rfl:     polyethylene glycol-propylene glycol (SYSTANE ULTRA) 0.4-0.3 % SOLN ophthalmic solution, Place 1 drop into both eyes 2 times daily as needed for dry eyes, Disp: , Rfl:     rivaroxaban ANTICOAGULANT (XARELTO ANTICOAGULANT) 20 MG TABS tablet, TAKE 1 TABLET(20 MG) BY MOUTH DAILY WITH DINNER, Disp: 90 tablet, Rfl: 3    Allergies -   Allergies   Allergen Reactions    Keflex [Cephalexin Hcl] Hives    Penicillins Hives    Betadine [Povidone Iodine] Itching     Possibly itchiness, no hives, uncertain allergic reaction.       Social History -   Social History     Socioeconomic History    Marital status:      Spouse name: Not on file    Number of children: Not on file    Years of education: Not on file    Highest education level: Not on file   Occupational History     Employer: [employer]     Comment: Scheduling   Social Needs    Financial resource strain: Not on file    Food insecurity     Worry: Not on file     Inability: Not on file    Transportation needs     Medical: Not on file     Non-medical: Not on file   Tobacco Use    Smoking status: Never Smoker    Smokeless tobacco: Never Used    Tobacco comment: Lives in smoke free household   Substance and Sexual Activity    Alcohol use: No     Alcohol/week: 0.0 standard drinks    Drug use: No    Sexual activity: Yes     Partners: Male     Birth control/protection: I.U.D.   Lifestyle    Physical activity     Days per week: Not on file     Minutes per session: Not on file    Stress: Not on file   Relationships    Social connections     Talks on phone: Not on file     Gets together: Not on file     Attends [employer] service: Not on file     Active member of club or organization: Not on file     Attends meetings of clubs or organizations: Not on file     Relationship status: Not on file    Intimate partner violence     Fear of current or ex partner: Not on file      Emotionally abused: Not on file     Physically abused: Not on file     Forced sexual activity: Not on file   Other Topics Concern    Parent/sibling w/ CABG, MI or angioplasty before 65F 55M? No     Service Yes    Blood Transfusions No    Caffeine Concern No    Occupational Exposure No    Hobby Hazards No    Sleep Concern No    Stress Concern No    Weight Concern No    Special Diet No    Back Care No    Exercise Yes    Bike Helmet No    Seat Belt Yes    Self-Exams Yes   Social History Narrative    Not on file       Family History -   Family History   Problem Relation Age of Onset    Eye Disorder Mother         cataracts     Rheumatoid Arthritis Mother     Cancer - colorectal Paternal Grandmother         ?    Thyroid Disease Father     Breast Cancer Maternal Aunt         diagnosed in 30s (mother's 1/2 sister)    Cancer No family hx of     Diabetes No family hx of     Hypertension No family hx of     Cerebrovascular Disease No family hx of     Macular Degeneration No family hx of     Glaucoma No family hx of        Physical Exam  General - The patient is in no distress.  Alert, answers questions and cooperates with examination appropriately.   Voice and Breathing - The patient was breathing comfortably without the use of accessory muscles. There was no wheezing, stridor, or stertor.  The patients voice was clear and strong.  Head and Face - HB grade 1 of 6 bilaterally  Neck - The left parotid gland has a 2 cm cyst, somewhat firm, of the superficial gland just anterior to the tragus and deep to the lobule. No tenderness. The rest of the occipital, submental, submandibular, internal jugular chain, and supraclavicular nodes did not demonstrate any abnormal lymph nodes or masses. Right parotid gland feels normal.    PROCEDURE - I explained the risks of needle aspiration including infection, bleeding, recurrence, the need for future procedures including removal. She agreed and wished to proceed. I cleansed the skin  with alcohol (she has a betadine allergey). I injected 1% lidocaine, 1:100,000 epinephrine (1 ml) into the soft tissue just anterior to the left ear lobule. I then used a 16 gauge needle and aspirated 7 ml of turbid brown cyst fluid. Minimal bleeding. Bacitracin and a bandaid was placed.     A/P -     ICD-10-CM    1. Parotid cyst  K11.6 CT Soft Tissue Neck w Contrast          Jesica TAMIKA Mooney is a 48 year old female with a left parotid cyst. She has Sjogren's disease. I took a core needle biopsy 12/2020 and drained cyst fluid. I then drained it again 7/2021, 12/2021, 6/2022 and today. She was doing well until recently she had worsening swelling at the site. It has filled to about 2 cm. I drained this again today at her request (7 ml). Recheck in 6 months, sooner if this gets infected, enlarges faster, other lumps or masses appear. I recommend re-imaging following drainage to look for any mass associated with this. I want to make sure there is no solid, possibly neoplastic component to this that could be changing.    Cirilo Hernandez MD  Otolaryngology  [facility]

## 2024-07-02 ENCOUNTER — OFFICE VISIT (OUTPATIENT)
Dept: OTOLARYNGOLOGY | Facility: CLINIC | Age: 49
End: 2024-07-02
Payer: COMMERCIAL

## 2024-07-02 VITALS
WEIGHT: 215 LBS | HEART RATE: 72 BPM | BODY MASS INDEX: 37.49 KG/M2 | SYSTOLIC BLOOD PRESSURE: 113 MMHG | OXYGEN SATURATION: 95 % | DIASTOLIC BLOOD PRESSURE: 79 MMHG

## 2024-07-02 DIAGNOSIS — K11.6 PAROTID CYST: Primary | ICD-10-CM

## 2024-07-02 PROCEDURE — 99213 OFFICE O/P EST LOW 20 MIN: CPT | Mod: 25 | Performed by: OTOLARYNGOLOGY

## 2024-07-02 PROCEDURE — 10160 PNXR ASPIR ABSC HMTMA BULLA: CPT | Performed by: OTOLARYNGOLOGY

## 2024-07-02 NOTE — LETTER
7/2/2024      Jesica Mooney  [address]  [place] [address]      Dear Colleague,    Thank you for referring your patient, Jesica Mooney, to the [facility]. Please see a copy of my visit note below.    Chief Complaint - left parotid cyst    History of Present Illness - Jesica Mooney is a 48 year old female who returns with a left parotid cyst. It has been present since 11/2020. She had one episode of painful parotid or cheek swelling about 10 years ago too. She has a h/o Sjogren's, diagnosis 6/2016, but she has had symptoms prior to that. CT neck 12/10/2020 images reviewed showing a left parotid cystic mass measuring approximately 2.7 x 2.4 x 2.5 cm. No lymphadenopathy. I performed a core needle biopsy on this (12/2020). It was negative and she had a lot of fluid that poured out at the time of biopsy.  I carefully reviewed her CT neck and her ultrasound images.  This information in combination with a very thin copious fluid collection on core needle biopsy leads me to believe that this is a simple cyst, a lymphoepithelial cyst, or sialocele.  She has Sjogren's and the anatomy of her parotid gland on the CT suggests Sjogren's with a cyst.  I see no adjacent nodule or mass suggesting malignancy or other tumor. The cyst enlarges intermittently, and I have had to drain this about every 6 months at one point, but last saw her 10/2022. I drained this 12/2020, 7/2021, 12/2021, 6/9/22, and 10/2022.    She returns and notes slowly worsening left parotid swelling at her cyst site. No infection or pain. No redness. No numbness. No other lumps or bumps. no facial weakness. She would like the cyst aspirated again.    Past Medical History -   Patient Active Problem List   Diagnosis     BMI 40.0-44.9, adult (H)     CARDIOVASCULAR SCREENING; LDL GOAL LESS THAN 160     Other acute pulmonary embolism     Sjogren's syndrome (H24)     Screening for cervical cancer     Obstruction of left  ureteropelvic junction due to stone     Acute left-sided low back pain without sciatica       Current Medications -   Current Outpatient Medications:      cevimeline (EVOXAC) 30 MG capsule, Take 1 capsule (30 mg) by mouth 2 times daily, Disp: 180 capsule, Rfl: 3     levonorgestrel (MIRENA) 20 MCG/DAY IUD, 1 each by Intrauterine route once, Disp: , Rfl:      polyethylene glycol-propylene glycol (SYSTANE ULTRA) 0.4-0.3 % SOLN ophthalmic solution, Place 1 drop into both eyes 2 times daily as needed for dry eyes, Disp: , Rfl:      rivaroxaban ANTICOAGULANT (XARELTO ANTICOAGULANT) 20 MG TABS tablet, TAKE 1 TABLET(20 MG) BY MOUTH DAILY WITH DINNER, Disp: 90 tablet, Rfl: 3    Allergies -   Allergies   Allergen Reactions     Keflex [Cephalexin Hcl] Hives     Penicillins Hives     Betadine [Povidone Iodine] Itching     Possibly itchiness, no hives, uncertain allergic reaction.       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   Lifestyle     Physical activity     Days per week: Not on file     Minutes per session: Not on file     Stress: Not on file   Relationships     Social connections     Talks on phone: Not on file     Gets together: Not on file      Attends Oriental orthodox service: Not on file     Active member of club or organization: Not on file     Attends meetings of clubs or organizations: Not on file     Relationship status: Not on file     Intimate partner violence     Fear of current or ex partner: Not on file     Emotionally abused: Not on file     Physically abused: Not on file     Forced sexual activity: Not on file   Other Topics Concern     Parent/sibling w/ CABG, MI or angioplasty before 65F 55M? No      Service Yes     Blood Transfusions No     Caffeine Concern No     Occupational Exposure No     Hobby Hazards No     Sleep Concern No     Stress Concern No     Weight Concern No     Special Diet No     Back Care No     Exercise Yes     Bike Helmet No     Seat Belt Yes     Self-Exams Yes   Social History Narrative     Not on file       Family History -   Family History   Problem Relation Age of Onset     Eye Disorder Mother         cataracts      Rheumatoid Arthritis Mother      Cancer - colorectal Paternal Grandmother         ?     Thyroid Disease Father      Breast Cancer Maternal Aunt         diagnosed in 30s (mother's 1/2 sister)     Cancer No family hx of      Diabetes No family hx of      Hypertension No family hx of      Cerebrovascular Disease No family hx of      Macular Degeneration No family hx of      Glaucoma No family hx of        Physical Exam  General - The patient is in no distress.  Alert, answers questions and cooperates with examination appropriately.   Voice and Breathing - The patient was breathing comfortably without the use of accessory muscles. There was no wheezing, stridor, or stertor.  The patients voice was clear and strong.  Head and Face - HB grade 1 of 6 bilaterally  Neck - The left parotid gland has a 2 cm cyst, somewhat firm, of the superficial gland just anterior to the tragus and deep to the lobule. No tenderness. The rest of the occipital, submental, submandibular, internal jugular chain, and supraclavicular  nodes did not demonstrate any abnormal lymph nodes or masses. Right parotid gland feels normal.    PROCEDURE - I explained the risks of needle aspiration including infection, bleeding, recurrence, the need for future procedures including removal. She agreed and wished to proceed. I cleansed the skin with alcohol (she has a betadine allergey). I injected 1% lidocaine, 1:100,000 epinephrine (1 ml) into the soft tissue just anterior to the left ear lobule. I then used a 16 gauge needle and aspirated 7 ml of turbid brown cyst fluid. Minimal bleeding. Bacitracin and a bandaid was placed.     A/P -     ICD-10-CM    1. Parotid cyst  K11.6 CT Soft Tissue Neck w Contrast          Jesica Mooney is a 48 year old female with a left parotid cyst. She has Sjogren's disease. I took a core needle biopsy 12/2020 and drained cyst fluid. I then drained it again 7/2021, 12/2021, 6/2022 and today. She was doing well until recently she had worsening swelling at the site. It has filled to about 2 cm. I drained this again today at her request (7 ml). Recheck in 6 months, sooner if this gets infected, enlarges faster, other lumps or masses appear. I recommend re-imaging following drainage to look for any mass associated with this. I want to make sure there is no solid, possibly neoplastic component to this that could be changing.    Cirilo Hernandez MD  Otolaryngology  [facility]      Again, thank you for allowing me to participate in the care of your patient.        Sincerely,        Cirilo Hernandez MD

## 2024-07-09 ENCOUNTER — TRANSFERRED RECORDS (OUTPATIENT)
Dept: HEALTH INFORMATION MANAGEMENT | Facility: CLINIC | Age: 49
End: 2024-07-09
Payer: COMMERCIAL

## 2024-07-16 ENCOUNTER — PRE VISIT (OUTPATIENT)
Dept: UROLOGY | Facility: CLINIC | Age: 49
End: 2024-07-16
Payer: COMMERCIAL

## 2024-07-16 ENCOUNTER — TELEPHONE (OUTPATIENT)
Dept: UROLOGY | Facility: CLINIC | Age: 49
End: 2024-07-16
Payer: COMMERCIAL

## 2024-07-16 NOTE — PROGRESS NOTES
Video start time: 6:55 AM  Video end time: 6:13 AM    CC: Kidney stone follow up    Assessment/Plan:  48 year old female with a history of kidney stones, with Litholink showing worsening hypercalciuria despite a largely-normal sodium level. Will check some labs to rule out influence of this, including BMP, PTH and 1,25 dihydroxy D. We discussed that if these labs come back normal, we would consider this to be idiopathic hypercalciuria and she would then benefit from a thiazide to help bring her urine calcium down. Will await results of the labs and if unrevealing, will start her on hydrochlorothiazide 25 mg daily and have her repeat Litholink after a few months.     Indu Farfan, CNP  Department of Urology      Subjective:   48 year old female with PMH of kidney stones who presents for follow up. Known right lower pole stone that we have been monitoring. Our last visit was in January, at which time we reviewed her Litholink results, showing  suboptimal urine volume, borderline hypercalciuria, high urine pH, and mild CaOx/high CaP stone risks. Advised her to work on drinking more water and reducing her sodium intake.     She developed some flank pain a few months ago and a CT was obtained to check for passing stone, though only showed the small stone at the bottom of her right kidney and no obstructing, ureteral stones.     Litholink repeated last week, with results showing worsening hypercalciuria with largely stable urine sodium, high urine pH and high CaP stone risk, per below:         Serum calcium has been normal. She states that she has been eating a lot of cereal lately, but her milk intake actually seems less than it has been in the past. Eats ice cream occasionally. Denies any use of calcium/vitamin D supplements or Tums.     Objective:     Exam:   Patient is a 48 year old female   No vital signs obtained due to virtual visit.  General Appearance: Well groomed, hygenic  Respiratory: No cough, no  respiratory distress or labored breathing  Musculoskeletal: Grossly normal with no gross deficits  Skin: No discoloration or apparent rashes  Neurologic: No tremors  Psychiatric: Alert and oriented  Further examination is deferred due to the nature of our visit.

## 2024-07-16 NOTE — TELEPHONE ENCOUNTER
Reason for visit: follow-up     Relevant information: 6-month. Discussion of CT and kidney stones, KUB x-ray and repeat 24-hour collection beforehand    Records/imaging/labs/orders: records available, appointment specific labs still needed    Pt called: Yes, left voicemail    At Rooming: standard procedure    Joleen Mendoza  7/16/2024  10:34 AM

## 2024-07-16 NOTE — TELEPHONE ENCOUNTER
Writer called pt to confirm whether or not they had their x-ray and 24-hour collection completed prior to their appointment. Pt did not  the phone so writer left a voicemail detailing the above.

## 2024-07-19 ENCOUNTER — VIRTUAL VISIT (OUTPATIENT)
Dept: UROLOGY | Facility: CLINIC | Age: 49
End: 2024-07-19
Payer: COMMERCIAL

## 2024-07-19 DIAGNOSIS — N20.0 KIDNEY STONE: Primary | ICD-10-CM

## 2024-07-19 PROCEDURE — 99213 OFFICE O/P EST LOW 20 MIN: CPT | Mod: 95 | Performed by: NURSE PRACTITIONER

## 2024-07-19 ASSESSMENT — PAIN SCALES - GENERAL: PAINLEVEL: NO PAIN (0)

## 2024-07-19 NOTE — PROGRESS NOTES
Virtual Visit Details    Type of service:  Video Visit  Originating Location (pt. Location): Home  Distant Location (provider location):  Off-site  Platform used for Video Visit: [employer]

## 2024-07-19 NOTE — LETTER
7/19/2024       RE: Jesica Mooney  [address]  [place] [address]     Dear Colleague,    Thank you for referring your patient, Jesica Mooney, to the [facility] [place] [facility]. Please see a copy of my visit note below.    Video start time: 6:55 AM  Video end time: 6:13 AM    CC: Kidney stone follow up    Assessment/Plan:  48 year old female with a history of kidney stones, with Litholink showing worsening hypercalciuria despite a largely-normal sodium level. Will check some labs to rule out influence of this, including BMP, PTH and 1,25 dihydroxy D. We discussed that if these labs come back normal, we would consider this to be idiopathic hypercalciuria and she would then benefit from a thiazide to help bring her urine calcium down. Will await results of the labs and if unrevealing, will start her on hydrochlorothiazide 25 mg daily and have her repeat Litholink after a few months.     Indu Farfan, CNP  Department of Urology      Subjective:   48 year old female with PMH of kidney stones who presents for follow up. Known right lower pole stone that we have been monitoring. Our last visit was in January, at which time we reviewed her Litholink results, showing  suboptimal urine volume, borderline hypercalciuria, high urine pH, and mild CaOx/high CaP stone risks. Advised her to work on drinking more water and reducing her sodium intake.     She developed some flank pain a few months ago and a CT was obtained to check for passing stone, though only showed the small stone at the bottom of her right kidney and no obstructing, ureteral stones.     Litholink repeated last week, with results showing worsening hypercalciuria with largely stable urine sodium, high urine pH and high CaP stone risk, per below:         Serum calcium has been normal. She states that she has been eating a lot of cereal lately, but her milk intake  actually seems less than it has been in the past. Eats ice cream occasionally. Denies any use of calcium/vitamin D supplements or Tums.     Objective:     Exam:   Patient is a 48 year old female   No vital signs obtained due to virtual visit.  General Appearance: Well groomed, hygenic  Respiratory: No cough, no respiratory distress or labored breathing  Musculoskeletal: Grossly normal with no gross deficits  Skin: No discoloration or apparent rashes  Neurologic: No tremors  Psychiatric: Alert and oriented  Further examination is deferred due to the nature of our visit.             Virtual Visit Details    Type of service:  Video Visit  Originating Location (pt. Location): Home  Distant Location (provider location):  Off-site  Platform used for Video Visit: [employer]

## 2024-07-19 NOTE — PATIENT INSTRUCTIONS
UROLOGY CLINIC VISIT PATIENT INSTRUCTIONS    -Will check some labs, including a metabolic panel, parathyroid hormone level and vitamin D. I will be in touch with you regarding results and next steps.     If you have any issues, questions or concerns in the meantime, do not hesitate to contact us at [phone] or via [employer].     Indu Farfan, CNP  Department of Urology

## 2024-07-19 NOTE — NURSING NOTE
Current patient location: [address] [place] [address]  [place] [address]    Is the patient currently in the state of [place]? YES    Visit mode:VIDEO    If the visit is dropped, the patient can be reconnected by: VIDEO VISIT: Text to cell phone:   Telephone Information:   Mobile [phone]       Will anyone else be joining the visit? NO  (If patient encounters technical issues they should call [phone]    How would you like to obtain your AVS? MyChart    Are changes needed to the allergy or medication list? No    Are refills needed on medications prescribed by this physician? NO    Reason for visit: RECHECK (6 month follow up with KUB prior/)    Nimisha PRIDEF

## 2024-07-31 ENCOUNTER — LAB (OUTPATIENT)
Dept: LAB | Facility: CLINIC | Age: 49
End: 2024-07-31
Payer: COMMERCIAL

## 2024-07-31 DIAGNOSIS — N20.0 KIDNEY STONE: ICD-10-CM

## 2024-07-31 LAB
ANION GAP SERPL CALCULATED.3IONS-SCNC: 8 MMOL/L (ref 7–15)
BUN SERPL-MCNC: 12.4 MG/DL (ref 6–20)
CALCIUM SERPL-MCNC: 9.3 MG/DL (ref 8.8–10.4)
CHLORIDE SERPL-SCNC: 108 MMOL/L (ref 98–107)
CREAT SERPL-MCNC: 0.82 MG/DL (ref 0.51–0.95)
EGFRCR SERPLBLD CKD-EPI 2021: 87 ML/MIN/1.73M2
GLUCOSE SERPL-MCNC: 91 MG/DL (ref 70–99)
HCO3 SERPL-SCNC: 25 MMOL/L (ref 22–29)
POTASSIUM SERPL-SCNC: 4.3 MMOL/L (ref 3.4–5.3)
PTH-INTACT SERPL-MCNC: 28 PG/ML (ref 15–65)
SODIUM SERPL-SCNC: 141 MMOL/L (ref 135–145)

## 2024-07-31 PROCEDURE — 83970 ASSAY OF PARATHORMONE: CPT

## 2024-07-31 PROCEDURE — 82652 VIT D 1 25-DIHYDROXY: CPT

## 2024-07-31 PROCEDURE — 80048 BASIC METABOLIC PNL TOTAL CA: CPT

## 2024-07-31 PROCEDURE — 36415 COLL VENOUS BLD VENIPUNCTURE: CPT

## 2024-08-01 LAB — 1,25(OH)2D SERPL-MCNC: 32.2 PG/ML (ref 19.9–79.3)

## 2024-08-02 ENCOUNTER — TELEPHONE (OUTPATIENT)
Dept: UROLOGY | Facility: CLINIC | Age: 49
End: 2024-08-02
Payer: COMMERCIAL

## 2024-08-02 DIAGNOSIS — R82.994 HYPERCALCIURIA: Primary | ICD-10-CM

## 2024-08-02 RX ORDER — HYDROCHLOROTHIAZIDE 25 MG/1
25 TABLET ORAL DAILY
Qty: 90 TABLET | Refills: 3 | Status: SHIPPED | OUTPATIENT
Start: 2024-08-02

## 2024-08-02 NOTE — TELEPHONE ENCOUNTER
Left Voicemail (1st Attempt) for the patient to call back and schedule the following:    Appointment type: Return   Provider: Indu Farfan   Return date: November   Specialty phone number: [phone]  Additonal Notes: follow up visit with me in about three months to review. - per staff message

## 2024-08-02 NOTE — TELEPHONE ENCOUNTER
Sent order for 24 hour urine kit x 1 to [facility] via [employer]. [employer] message sent to patient with instructions for completion in approximately 2 months     CULLEN Perkins  Care Coordinator- Urology   [phone]

## 2024-09-16 ENCOUNTER — ANCILLARY PROCEDURE (OUTPATIENT)
Dept: CT IMAGING | Facility: CLINIC | Age: 49
End: 2024-09-16
Attending: OTOLARYNGOLOGY
Payer: COMMERCIAL

## 2024-09-16 DIAGNOSIS — K11.6 PAROTID CYST: ICD-10-CM

## 2024-09-16 PROCEDURE — 70491 CT SOFT TISSUE NECK W/DYE: CPT | Mod: TC | Performed by: RADIOLOGY

## 2024-09-16 RX ORDER — IOPAMIDOL 755 MG/ML
90 INJECTION, SOLUTION INTRAVASCULAR ONCE
Status: COMPLETED | OUTPATIENT
Start: 2024-09-16 | End: 2024-09-16

## 2024-09-16 RX ADMIN — IOPAMIDOL 90 ML: 755 INJECTION, SOLUTION INTRAVASCULAR at 11:50

## 2024-10-16 DIAGNOSIS — R82.994 HYPERCALCIURIA: ICD-10-CM

## 2024-10-16 RX ORDER — HYDROCHLOROTHIAZIDE 25 MG/1
25 TABLET ORAL DAILY
Qty: 90 TABLET | Refills: 3 | Status: SHIPPED | OUTPATIENT
Start: 2024-10-16

## 2024-10-16 NOTE — TELEPHONE ENCOUNTER
Patient request script be reordered to a new pharmacy    Thank you,  NATHANIEL EscalonaN RN-Triage-Urology

## 2024-10-18 ENCOUNTER — MYC MEDICAL ADVICE (OUTPATIENT)
Dept: RHEUMATOLOGY | Facility: CLINIC | Age: 49
End: 2024-10-18
Payer: COMMERCIAL

## 2024-10-18 DIAGNOSIS — M35.01 SJOGREN'S SYNDROME WITH KERATOCONJUNCTIVITIS SICCA (H): ICD-10-CM

## 2024-10-21 ENCOUNTER — LAB (OUTPATIENT)
Dept: LAB | Facility: CLINIC | Age: 49
End: 2024-10-21
Payer: COMMERCIAL

## 2024-10-21 DIAGNOSIS — M35.01 SJOGREN'S SYNDROME WITH KERATOCONJUNCTIVITIS SICCA (H): ICD-10-CM

## 2024-10-21 LAB
ALBUMIN MFR UR ELPH: 6.1 MG/DL
ALBUMIN SERPL BCG-MCNC: 4.1 G/DL (ref 3.5–5.2)
ALBUMIN UR-MCNC: NEGATIVE MG/DL
ALP SERPL-CCNC: 54 U/L (ref 40–150)
ALT SERPL W P-5'-P-CCNC: 16 U/L (ref 0–50)
ANION GAP SERPL CALCULATED.3IONS-SCNC: 12 MMOL/L (ref 7–15)
APPEARANCE UR: ABNORMAL
AST SERPL W P-5'-P-CCNC: 29 U/L (ref 0–45)
BACTERIA #/AREA URNS HPF: ABNORMAL /HPF
BASOPHILS # BLD AUTO: 0 10E3/UL (ref 0–0.2)
BASOPHILS NFR BLD AUTO: 1 %
BILIRUB SERPL-MCNC: 0.6 MG/DL
BILIRUB UR QL STRIP: NEGATIVE
BUN SERPL-MCNC: 9.3 MG/DL (ref 6–20)
CALCIUM SERPL-MCNC: 9.5 MG/DL (ref 8.8–10.4)
CHLORIDE SERPL-SCNC: 101 MMOL/L (ref 98–107)
COLOR UR AUTO: YELLOW
CREAT SERPL-MCNC: 0.9 MG/DL (ref 0.51–0.95)
CREAT UR-MCNC: 99.7 MG/DL
EGFRCR SERPLBLD CKD-EPI 2021: 78 ML/MIN/1.73M2
EOSINOPHIL # BLD AUTO: 0.4 10E3/UL (ref 0–0.7)
EOSINOPHIL NFR BLD AUTO: 6 %
ERYTHROCYTE [DISTWIDTH] IN BLOOD BY AUTOMATED COUNT: 12.2 % (ref 10–15)
GLUCOSE SERPL-MCNC: 88 MG/DL (ref 70–99)
GLUCOSE UR STRIP-MCNC: NEGATIVE MG/DL
HCO3 SERPL-SCNC: 25 MMOL/L (ref 22–29)
HCT VFR BLD AUTO: 45.5 % (ref 35–47)
HGB BLD-MCNC: 15.6 G/DL (ref 11.7–15.7)
HGB UR QL STRIP: ABNORMAL
IMM GRANULOCYTES # BLD: 0 10E3/UL
IMM GRANULOCYTES NFR BLD: 0 %
KETONES UR STRIP-MCNC: NEGATIVE MG/DL
LEUKOCYTE ESTERASE UR QL STRIP: ABNORMAL
LYMPHOCYTES # BLD AUTO: 2.2 10E3/UL (ref 0.8–5.3)
LYMPHOCYTES NFR BLD AUTO: 31 %
MCH RBC QN AUTO: 31 PG (ref 26.5–33)
MCHC RBC AUTO-ENTMCNC: 34.3 G/DL (ref 31.5–36.5)
MCV RBC AUTO: 91 FL (ref 78–100)
MONOCYTES # BLD AUTO: 0.8 10E3/UL (ref 0–1.3)
MONOCYTES NFR BLD AUTO: 10 %
NEUTROPHILS # BLD AUTO: 3.8 10E3/UL (ref 1.6–8.3)
NEUTROPHILS NFR BLD AUTO: 52 %
NITRATE UR QL: NEGATIVE
PH UR STRIP: 6 [PH] (ref 5–7)
PLATELET # BLD AUTO: 253 10E3/UL (ref 150–450)
POTASSIUM SERPL-SCNC: 3.8 MMOL/L (ref 3.4–5.3)
PROT SERPL-MCNC: 7.7 G/DL (ref 6.4–8.3)
PROT/CREAT 24H UR: 0.06 MG/MG CR (ref 0–0.2)
RBC # BLD AUTO: 5.03 10E6/UL (ref 3.8–5.2)
RBC #/AREA URNS AUTO: ABNORMAL /HPF
SODIUM SERPL-SCNC: 138 MMOL/L (ref 135–145)
SP GR UR STRIP: <=1.005 (ref 1–1.03)
SQUAMOUS #/AREA URNS AUTO: ABNORMAL /LPF
UROBILINOGEN UR STRIP-ACNC: 0.2 E.U./DL
WBC # BLD AUTO: 7.2 10E3/UL (ref 4–11)
WBC #/AREA URNS AUTO: ABNORMAL /HPF

## 2024-10-21 PROCEDURE — 80053 COMPREHEN METABOLIC PANEL: CPT

## 2024-10-21 PROCEDURE — 85025 COMPLETE CBC W/AUTO DIFF WBC: CPT

## 2024-10-21 PROCEDURE — 81001 URINALYSIS AUTO W/SCOPE: CPT

## 2024-10-21 PROCEDURE — 36415 COLL VENOUS BLD VENIPUNCTURE: CPT

## 2024-10-21 PROCEDURE — 84156 ASSAY OF PROTEIN URINE: CPT

## 2024-10-21 RX ORDER — CEVIMELINE HYDROCHLORIDE 30 MG/1
30 CAPSULE ORAL 2 TIMES DAILY
Qty: 180 CAPSULE | Refills: 3 | Status: CANCELLED | OUTPATIENT
Start: 2024-10-21

## 2024-10-21 RX ORDER — CEVIMELINE HYDROCHLORIDE 30 MG/1
30 CAPSULE ORAL 2 TIMES DAILY
Qty: 180 CAPSULE | Refills: 0 | Status: SHIPPED | OUTPATIENT
Start: 2024-10-21 | End: 2024-10-28

## 2024-10-21 NOTE — TELEPHONE ENCOUNTER
Medication:   Cevimeline HCL 30mg  Last written on:   10/23/2023  Quantity:   180    Refills:   3    Last office visit:   10/23/2023  Next office visit:   10/28/2023  Last labs:   10/21/2024

## 2024-10-21 NOTE — TELEPHONE ENCOUNTER
After chart review and based on prior discussion with MD it was noted that this is appropriate for a refill.    NATHANIEL DixonN RN Specialty Triage 10/21/2024 2:58 PM

## 2024-10-28 ENCOUNTER — OFFICE VISIT (OUTPATIENT)
Dept: RHEUMATOLOGY | Facility: CLINIC | Age: 49
End: 2024-10-28
Payer: COMMERCIAL

## 2024-10-28 VITALS
WEIGHT: 218.4 LBS | SYSTOLIC BLOOD PRESSURE: 109 MMHG | OXYGEN SATURATION: 94 % | HEART RATE: 70 BPM | DIASTOLIC BLOOD PRESSURE: 77 MMHG | TEMPERATURE: 98.7 F | BODY MASS INDEX: 38.08 KG/M2

## 2024-10-28 DIAGNOSIS — Z23 NEED FOR PROPHYLACTIC VACCINATION AND INOCULATION AGAINST INFLUENZA: ICD-10-CM

## 2024-10-28 DIAGNOSIS — M35.01 SJOGREN'S SYNDROME WITH KERATOCONJUNCTIVITIS SICCA (H): Primary | ICD-10-CM

## 2024-10-28 PROCEDURE — 90471 IMMUNIZATION ADMIN: CPT | Performed by: INTERNAL MEDICINE

## 2024-10-28 PROCEDURE — 90673 RIV3 VACCINE NO PRESERV IM: CPT | Performed by: INTERNAL MEDICINE

## 2024-10-28 PROCEDURE — 99214 OFFICE O/P EST MOD 30 MIN: CPT | Mod: 25 | Performed by: INTERNAL MEDICINE

## 2024-10-28 RX ORDER — CEVIMELINE HYDROCHLORIDE 30 MG/1
30 CAPSULE ORAL 2 TIMES DAILY
Qty: 180 CAPSULE | Refills: 3 | Status: SHIPPED | OUTPATIENT
Start: 2024-10-28

## 2024-10-28 NOTE — PROGRESS NOTES
Rheumatology Clinic Visit      Jesica Mooney MRN# [id number]   YOB: 1975 Age: 49 year old      Date of visit: 10/28/24   Hematology/Oncology: Dr. Flavia Leon and Dr. Ata Rendon  PCP: Brenda Graves    Chief Complaint   Patient presents with:   Sjogren's syndrome     Assessment and Plan     1. Sjogren's Syndrome (JAN >1:[id number], SSA >8):  She has punctal plugs; restasis was reportedly ineffective when she used a sample from her ophthalmologist.  She continues following with her ophthalmologist.  Pilocarpine was tolerated but provided no benefit so she stopped it with no change in symptoms; now on Evoxac and she finds that 30 mg 1-2 times daily is sufficient to control her dry mouth.  Good dentition and follows with a dentist every 6 months.  No recent cavities.  Chronic illness, stable.    - Continue  Evoxac 30mg 1-2 times daily  - Labs in 1 year: CBC, CMP,  UA, Uprotein:creatinine    2. Pulmonary Embolism: Diagnosed 12/2015 and is on anticoagulation that is managed in another clinic.  Documented here for historical significance    3.  History of left parotid cyst: Following with ENT and has had this drained previously and about once every 6 months for period of time but not for over 1 year now.  Following with Dr. Hernandez (ENT) and has already discussed about possibly having surgery for this and that is an option that she may consider in the future if the cyst continues to recur.    4.  Microscopic hematuria: History of nephrolithiasis and has already seen urology.  Was supposed to have a repeat CT scan but this has not yet been done.  Will send a message to Indu Farfan and the patient will reach out to the urology clinic as well.     5.  Vaccinations: Vaccinations reviewed with Ms. Mooney.    - Influenza: encouraged yearly vaccination  - Shingrix: Up to date  - COVID-19: Advised keeping updated    Total minutes spent in evaluation with patient, documentation, , and review  of pertinent studies and chart notes: 16  The longitudinal plan of care for the rheumatology problem(s) were addressed during this visit.  Due to added complexity of care, we will continue to support the patient and the subsequent management of this condition with ongoing continuity of care.      Thank you for involving me in the care of the patient    Return to clinic: 11-12 months, earlier PRN    HPI   Jesica Mooney is a 49 year old female with history of pulmonary embolism and obesity who presents for follow-up of Sjogren's Syndrome.     10/23/2023: Has not had the left parotid cyst drained for over 1 year now.  Left parotid cyst is not bothering her but is still firm and unchanged per patient.  Dry mouth controlled with Evoxac and frequent sips of water.  She notes that she drinks coffee throughout the morning.  Follows with a dentist at least twice yearly and has not had a recent dental caries.  Dry eyes treated with artificial tears, and warm compresses 1-2 times per day.  She is considering artificial eyelashes and tattooed eyeliner; she will discuss this with her ophthalmologist first before proceeding.    Today, 10/28/2024: Dry eye and dry mouth symptoms are stable.  Occasional mild degenerative symptoms at the PIPs and DIPs that does not occur on a daily basis.  Following with urology and was recently started on hydrochlorothiazide.  No new concerns.    Denies fevers, chills, nausea, vomiting, constipation, diarrhea. No abdominal pain. No chest pain/pressure, palpitations, or shortness of breath. No oral or nasal sores. No neck pain. No rash.  No weight loss or night sweats.    Tobacco: None  EtOH: None  Drugs: None    ROS   12 point review of system was completed and negative except as noted in the HPI     Active Problem List     Patient Active Problem List   Diagnosis    BMI 40.0-44.9, adult (H)    CARDIOVASCULAR SCREENING; LDL GOAL LESS THAN 160    Other acute pulmonary embolism    Sjogren's  syndrome (H)    Screening for cervical cancer    Obstruction of left ureteropelvic junction due to stone    Acute left-sided low back pain without sciatica     Past Medical History     Past Medical History:   Diagnosis Date    IUD (intrauterine device) in place     Mirena inserted 12/28/15    Obese      Past Surgical History     Past Surgical History:   Procedure Laterality Date    BIOPSY  a fews years ago    A mole was removed from by scalp    LASER HOLMIUM LITHOTRIPSY URETER(S), INSERT STENT, COMBINED Left 7/20/2022    Procedure: LEFT CYSTOURETEROSCOPY, WITH LITHOTRIPSY USING LASER AND URETERAL STENT INSERTION;  Surgeon: Jesus Roper MD;  [place] [facility] [place]    [facility]AL SURGERY PROCEDURE  10/1990    wisdom teeth extracted     Allergy     Allergies   Allergen Reactions    Keflex [Cephalexin Hcl] Hives    Penicillins Hives    Betadine [Povidone Iodine] Itching     Possibly itchiness, no hives, uncertain allergic reaction.     Current Medication List     Current Outpatient Medications   Medication Sig Dispense Refill    cevimeline (EVOXAC) 30 MG capsule Take 1 capsule (30 mg) by mouth 2 times daily. 180 capsule 0    hydrochlorothiazide (HYDRODIURIL) 25 MG tablet Take 1 tablet (25 mg) by mouth daily. 90 tablet 3    levonorgestrel (MIRENA) 20 MCG/DAY IUD 1 each by Intrauterine route once      polyethylene glycol-propylene glycol (SYSTANE ULTRA) 0.4-0.3 % SOLN ophthalmic solution Place 1 drop into both eyes 2 times daily as needed for dry eyes      rivaroxaban ANTICOAGULANT (XARELTO ANTICOAGULANT) 20 MG TABS tablet TAKE 1 TABLET(20 MG) BY MOUTH DAILY WITH DINNER 90 tablet 3     No current facility-administered medications for this visit.     Social History   See HPI    Family History     Family History   Problem Relation Age of Onset    Eye Disorder Mother         cataracts     Rheumatoid Arthritis Mother     Cancer - colorectal Paternal Grandmother         ?    Thyroid Disease Father     Breast Cancer Maternal  "Aunt         diagnosed in 30s (mother's 1/2 sister)    Cancer No family hx of     Diabetes No family hx of     Hypertension No family hx of     Cerebrovascular Disease No family hx of     Macular Degeneration No family hx of     Glaucoma No family hx of      Mother: RA    Physical Exam     Temp Readings from Last 3 Encounters:   10/28/24 98.7  F (37.1  C)   07/20/22 97.6  F (36.4  C) (Temporal)   06/06/22 97.9  F (36.6  C) (Tympanic)     BP Readings from Last 5 Encounters:   10/28/24 109/77   07/02/24 113/79   11/18/23 122/87   10/23/23 114/77   11/14/22 116/83     Pulse Readings from Last 1 Encounters:   10/28/24 70     Resp Readings from Last 1 Encounters:   10/23/23 16     Estimated body mass index is 38.08 kg/m  as calculated from the following:    Height as of 11/18/23: 1.613 m (5' 3.5\").    Weight as of this encounter: 99.1 kg (218 lb 6.4 oz).    GEN: NAD.  HEENT:  Anicteric, noninjected sclera. No obvious external lesions of the ear and nose. Hearing intact.  Dry mucous membranes.  Eyes appear to have good moisture by gross examination; tear pooling present.  CV: S1, S2. RRR. No m/r/g  PULM: No increased work of breathing. CTA bilaterally   MSK: MCPs, PIPs, DIPs without swelling or tenderness to palpation.  Wrists without swelling or tenderness to palpation.  Elbows without swelling or tenderness to palpation.   Knees and ankles without swelling or tenderness to palpation.  Negative MTP squeeze bilaterally.    SKIN: No rash or jaundice seen  PSYCH: Alert. Appropriate.      Labs / Imaging (select studies)     dsDNA  Recent Labs   Lab Test 11/25/16  1512   DNA 5     C3/C4  Recent Labs   Lab Test 11/27/17  0846 11/25/16  1512   F2KYLMS 98 98   L9EGMFI 21 21     CBC  Recent Labs   Lab Test 10/21/24  0802 10/16/23  0744 11/05/22  1026 11/21/21  1016 11/23/20  0853 11/25/19  0837 11/26/18  0830   WBC 7.2 6.5 7.4   < > 7.6 7.0 7.3   RBC 5.03 5.03 4.92   < > 4.88 5.13 4.91   HGB 15.6 15.5 15.1   < > 15.0 15.7 15.1 "   HCT 45.5 45.6 44.3   < > 43.6 46.9 44.4   MCV 91 91 90   < > 89 91 90   RDW 12.2 12.3 12.8   < > 12.7 12.8 12.6    225 280   < > 254 248 246   MCH 31.0 30.8 30.7   < > 30.7 30.6 30.8   MCHC 34.3 34.0 34.1   < > 34.4 33.5 34.0   NEUTROPHIL 52 44 49   < > 48.2 47.9 44.9   LYMPH 31 37 36   < > 31.7 33.7 39.3   MONOCYTE 10 11 8   < > 12.3 9.7 9.4   EOSINOPHIL 6 8 6   < > 7.4 8.3 6.1   BASOPHIL 1 1 1   < > 0.4 0.4 0.3   ANEU  --   --   --   --  3.7 3.3 3.3   ALYM  --   --   --   --  2.4 2.4 2.9   CARLY  --   --   --   --  0.9 0.7 0.7   AEOS  --   --   --   --  0.6 0.6 0.4   ABAS  --   --   --   --  0.0 0.0 0.0   ANEUTAUTO 3.8 2.9 3.6   < >  --   --   --    ALYMPAUTO 2.2 2.4 2.7   < >  --   --   --    AMONOAUTO 0.8 0.7 0.6   < >  --   --   --    AEOSAUTO 0.4 0.5 0.5   < >  --   --   --    ABSBASO 0.0 0.0 0.1   < >  --   --   --     < > = values in this interval not displayed.     CMP  Recent Labs   Lab Test 10/21/24  0802 07/31/24  0736 10/16/23  0744 11/05/22  1026 06/06/22  0822 11/21/21  1016 11/21/21  1016 11/23/20  0853 11/25/19  0837 11/26/18  0830 11/27/17  0846    141 137 141 139   < > 138 138 137 138 137   POTASSIUM 3.8 4.3 4.4 4.2 4.1   < > 4.0 4.0 4.2 4.1 4.0   CHLORIDE 101 108* 104 109 107   < > 107 106 108 106 105   CO2 25 25 25 28 29   < > 27 29 28 27 29   ANIONGAP 12 8 8 4 3   < > 4 3 1* 5 3   GLC 88 91 96 90 108*  --  87 89 98 96 88   BUN 9.3 12.4 9.0 11 12   < > 12 14 13 14 14   CR 0.90 0.82 0.85 0.75 0.81   < > 0.73 0.84 0.78 0.81 0.90   GFRESTIMATED 78 87 84 >90 90   < > >90 83 >90 77 68   GFRESTBLACK  --   --   --   --   --   --   --  >90 >90 >90 82   BROOKS 9.5 9.3 9.7 9.1 9.3   < > 9.0 9.0 9.6 9.3 9.2   BILITOTAL 0.6  --  0.5 0.8 0.4   < > 0.5 0.6 0.4 0.3 0.4   ALBUMIN 4.1  --  4.2 3.6 3.5   < > 3.2* 3.4 3.6 3.6 3.8   PROTTOTAL 7.7  --  7.7 7.6 7.8   < > 7.6 7.4 7.9 8.1 8.5   ALKPHOS 54  --  58 61 59   < > 59 69 61 63 65   AST 29  --  24 27 27   < > 26 21 22 25 26   ALT 16  --  20 45 46    < > 47 46 36 35 42    < > = values in this interval not displayed.     Calcium/VitaminD  Recent Labs   Lab Test 10/21/24  0802 07/31/24  0736 10/16/23  0744   BROOKS 9.5 9.3 9.7     ESR/CRP  Recent Labs   Lab Test 10/16/23  0744 11/27/17  0846 11/25/16  1512   SED 12 15 23*   CRP  --  <2.9 <2.9   CRPI <3.00  --   --      CK/Aldolase  Recent Labs   Lab Test 11/25/16  1512   CKT 96     Hepatitis C  Recent Labs   Lab Test 11/23/20  0853   HCVAB Nonreactive     HIV Screening  Recent Labs   Lab Test 11/23/20  0853   HIAGAB Nonreactive     UA  Recent Labs   Lab Test 10/21/24  0843 10/16/23  0809 11/05/22  1103 11/21/21  1016 11/23/20  0906 11/25/19  0840 11/26/18  0838   COLOR Yellow Yellow Yellow   < > Yellow Yellow Yellow   APPEARANCE Slightly Cloudy* Clear Clear   < > Clear Clear Clear   URINEGLC Negative Negative Negative   < > Negative Negative Negative   URINEBILI Negative Negative Negative   < > Negative Negative Negative   SG <=1.005 1.010 <=1.005   < > 1.015 1.015 1.010   URINEPH 6.0 6.0 6.5   < > 6.0 6.5 6.5   PROTEIN Negative Negative Negative   < > Negative Negative Negative   UROBILINOGEN 0.2 0.2 0.2   < > 0.2 0.2 0.2   NITRITE Negative Negative Negative   < > Negative Negative Negative   UBLD Small* Trace* Trace*   < > Small* Small* Trace*   LEUKEST Small* Negative Negative   < > Trace* Negative Trace*   WBCU 5-10* 0-5 None Seen   < > 0 - 5 0 - 5 0 - 5   RBCU 0-2 0-2 5-10*   < > O - 2 O - 2 2-5*   SQUAMOUSEPI  --   --   --   --  Moderate* Moderate* Few   BACTERIA Few* Few* None Seen   < >  --  Few*  --    MUCOUS  --   --   --   --  Present*  --   --     < > = values in this interval not displayed.     Urine Microscopic  Recent Labs   Lab Test 10/21/24  0843 10/16/23  0809 11/05/22  1103 11/21/21  1016 11/23/20  0906 11/25/19  0840 11/26/18  0838   WBCU 5-10* 0-5 None Seen   < > 0 - 5 0 - 5 0 - 5   RBCU 0-2 0-2 5-10*   < > O - 2 O - 2 2-5*   SQUAMOUSEPI  --   --   --   --  Moderate* Moderate* Few   BACTERIA Few*  Few* None Seen   < >  --  Few*  --    MUCOUS  --   --   --   --  Present*  --   --     < > = values in this interval not displayed.     Urine Protein  GHUTP and UTP= Urine protein (random), GHUTPG and UTPG = urine protein:creatinine ratio (random), UCRR = urine creatinine (random)  Recent Labs   Lab Test 10/21/24  0843 10/16/23  0809 11/05/22  1103 11/21/21  1016 11/23/20  0906 11/25/19  0840 11/26/18  0838   GHUTP 6.1 <6.0 6.1  --   --   --   --    UTP  --   --   --  <0.05 <0.05 0.06 <0.05   GHUTPG 0.06  --  0.13  --   --   --   --    UTPG  --   --   --   --  Unable to calculate due to low value 0.07 Unable to calculate due to low value   UCRR 99.7 39.1 48.0 28 98 81 46       Immunization History     Immunization History   Administered Date(s) Administered    COVID-19 Bivalent 12+ (Pfizer) 10/29/2022    COVID-19 MONOVALENT 12+ (Pfizer) 03/24/2021, 04/12/2021, 11/26/2021    Influenza (IIV3) PF 10/02/2012    Influenza Vaccine >6 months,quad, PF 10/05/2015, 10/13/2017, 10/15/2018, 09/16/2019, 10/13/2020, 10/20/2021    Nasal Influenza Vaccine 2-49 (FluMist) 10/11/2013, 10/16/2014, 10/11/2016    Pneumo Conj 13-V (2010&after) 03/12/2019    Pneumococcal 23 valent 09/03/2019    TDAP (Adacel,Boostrix) 01/17/2008    TDAP Vaccine (Adacel) 12/03/2018    Twinrix A/B 12/17/2012, 01/16/2013, 06/18/2013    Zoster recombinant adjuvanted (SHINGRIX) 03/12/2019, 09/03/2019          Chart documentation done in part with Dragon Voice recognition Software. Although reviewed after completion, some word and grammatical error may remain.    Dylan Donaot MD

## 2024-10-28 NOTE — PATIENT INSTRUCTIONS
RHEUMATOLOGY    [facility] [place]  [address]  [place] [address]    Phone number: [phone]  Fax number: [phone]    If you need a medication refill, please contact us as you may need lab work and/or a follow up visit prior to your refill.      Thank you for choosing [facility]!    DI Cobian RN 10/28/2024 10:44 AM

## 2024-11-11 ENCOUNTER — TRANSFERRED RECORDS (OUTPATIENT)
Dept: HEALTH INFORMATION MANAGEMENT | Facility: CLINIC | Age: 49
End: 2024-11-11
Payer: COMMERCIAL

## 2024-11-18 ENCOUNTER — TELEPHONE (OUTPATIENT)
Dept: UROLOGY | Facility: CLINIC | Age: 49
End: 2024-11-18
Payer: COMMERCIAL

## 2024-11-18 ENCOUNTER — PRE VISIT (OUTPATIENT)
Dept: UROLOGY | Facility: CLINIC | Age: 49
End: 2024-11-18
Payer: COMMERCIAL

## 2024-11-18 NOTE — TELEPHONE ENCOUNTER
Writer called pt to discuss their upcoming litholink follow-up appointment. Pt picked up the phone and stated that they had sent the completed litholink kit in about 10 days ago. Writer said they'd keep checking to make sure that the HIM scans end up in epic.

## 2024-11-18 NOTE — TELEPHONE ENCOUNTER
Reason for visit: return kidney stone     Relevant information: pt has sent the litholink, results haven't been received by [facility] and HIM scans aren't in epic.    Records/imaging/labs/orders: all records available    Joleen Mendoza  11/18/2024  12:36 PM

## 2025-01-11 ENCOUNTER — HEALTH MAINTENANCE LETTER (OUTPATIENT)
Age: 50
End: 2025-01-11

## 2025-02-24 ENCOUNTER — ANCILLARY PROCEDURE (OUTPATIENT)
Dept: MAMMOGRAPHY | Facility: CLINIC | Age: 50
End: 2025-02-24
Attending: NURSE PRACTITIONER
Payer: COMMERCIAL

## 2025-02-24 DIAGNOSIS — Z12.31 VISIT FOR SCREENING MAMMOGRAM: ICD-10-CM

## 2025-02-24 PROCEDURE — 77067 SCR MAMMO BI INCL CAD: CPT | Mod: TC | Performed by: RADIOLOGY

## 2025-02-24 PROCEDURE — 77063 BREAST TOMOSYNTHESIS BI: CPT | Mod: TC | Performed by: RADIOLOGY

## 2025-03-08 ENCOUNTER — HEALTH MAINTENANCE LETTER (OUTPATIENT)
Age: 50
End: 2025-03-08

## 2025-05-01 ENCOUNTER — VIRTUAL VISIT (OUTPATIENT)
Dept: HEMATOLOGY | Facility: CLINIC | Age: 50
End: 2025-05-01
Attending: PHYSICIAN ASSISTANT
Payer: COMMERCIAL

## 2025-05-01 VITALS — WEIGHT: 220 LBS | BODY MASS INDEX: 38.36 KG/M2

## 2025-05-01 DIAGNOSIS — D68.59 ANTITHROMBIN DEFICIENCY: Primary | ICD-10-CM

## 2025-05-01 DIAGNOSIS — Z71.89 ENCOUNTER FOR ANTICOAGULATION DISCUSSION AND COUNSELING: ICD-10-CM

## 2025-05-01 DIAGNOSIS — Z86.711 HISTORY OF PULMONARY EMBOLISM: ICD-10-CM

## 2025-05-01 DIAGNOSIS — Z79.01 CHRONIC ANTICOAGULATION: ICD-10-CM

## 2025-05-01 NOTE — PATIENT INSTRUCTIONS
[facility]  [facility]  [facility] [address], [address], [place] [address]  Main: [phone], Fax: [phone]    Jesica,   It was a pleasure seeing you today.  Thank you for allowing us to be involved in your care.  Please let us know if there is anything else we can do for you, so that we can be sure you are leaving completely satisfied with your care experience. Below is the plan that we discussed.     Continue Xarelto 20mg once daily. Refill sent.   Labs to be done prior to seeing Dr. Donato in the fall.   Call if you have any procedures, surgeries scheduled.   Set up appointment with primary care at [facility] is great.   For colonoscopy - hold Xarelto x 48 hours prior to procedure time. If no biopsies or polyps taken, okay to resume right away after procedure. If they do take biopsies or polyps, resume the next day.       We would like a provider on our team to see you at least annually for optimal care and to allow us to continue to prescribe for you.        Return to clinic in  in one year.    If you have questions or concerns, please don't hesitate to send a [employer] Message for non urgent matters or contact my nurse clinician, Lyndsay. Her number is [phone]. If they are unavailable and you have immediate concerns, please call [phone] and ask for a nurse.     Brittni Trejo, MPH, PA-C  [facility]

## 2025-05-01 NOTE — PROGRESS NOTES
[facility]  [address] [place] [address] [address], [place] [address]  Main: [phone], Fax: [phone]      Video Virtual Visit Note:    Patient: Jesica Mooney  MRN: [id number]  : 1975  OWEN: May 1, 2025      Due to the ongoing COVID-19 outbreak, this visit was conducted by video, with the patient's approval.      Reason of today's visit:  Follow up, history of antithrombin deficiency and venous thromboembolism, on long term anticoagulation     Clinical History Summary:  Jesica Mooney is a 49 year old female with past medical history significant for pulmonary embolism while on chronic (>20 years) estrogen containing oral contraceptive pills in 2015. She was started on Xarelto and her OCPs were discontinued. She ultimately ended up undergoing a hypercoagulable workup that showed high-titer positive JAN, persistently elevated D-dimer around 2.5 and persistent mildly reduced levels of antithrombin III (70s). Her lupus inhibitor, anticardiolipin antibodies, beta-2-glycoprotein antibodies, protein C and S levels, factor V Leiden and prothrombin gene mutation testing was negative. She was seen by Rheumatology and diagnosed with Sjogren's. She did have gene sequencing for antithrombin III deficiency and had VUS of SERPINC1 gene likely to be pathogenic in terms of clotting risk thus she was recommended to continue on long term anticoagulation for secondary prevention of venous thromboembolism. She has been maintained on Xarelto 20mg once daily.       In , her mutation was reclassified to pathogenic. She was informed of these results. It did not change her anticoagulation plan.        Interim History:  Today, Gretchen notes she is doing well. She denies any acute concerns. She is consistently taking Xarelto 20mg once daily with food. She notes no bleeding concerns or any frequently missed doses. She denies any venous thromboembolism symptoms. She has not  had any procedures this last year and has none planned. She has never had colonoscopy.          ROS:  Denies any bleeding complications. Specifically, no frequent epistaxis. No issues with oral mucosal bleeding. Denies any hematuria or blood in stools. Denies any shortness of breath. No chest pain. No cough. No fever. No leg pain or swelling.       Medications:   Current Outpatient Medications   Medication Sig Dispense Refill    cevimeline (EVOXAC) 30 MG capsule Take 1 capsule (30 mg) by mouth 2 times daily. 180 capsule 3    hydrochlorothiazide (HYDRODIURIL) 25 MG tablet Take 1 tablet (25 mg) by mouth daily. 90 tablet 3    levonorgestrel (MIRENA) 20 MCG/DAY IUD 1 each by Intrauterine route once      polyethylene glycol-propylene glycol (SYSTANE ULTRA) 0.4-0.3 % SOLN ophthalmic solution Place 1 drop into both eyes 2 times daily as needed for dry eyes      rivaroxaban ANTICOAGULANT (XARELTO ANTICOAGULANT) 20 MG TABS tablet TAKE 1 TABLET(20 MG) BY MOUTH DAILY WITH DINNER 90 tablet 3        Allergies:      Allergies   Allergen Reactions    Keflex [Cephalexin Hcl] Hives    Penicillins Hives    Betadine [Povidone Iodine] Itching     Possibly itchiness, no hives, uncertain allergic reaction.       PMH:   Past Medical History:   Diagnosis Date    IUD (intrauterine device) in place     Mirena inserted 12/28/15    Obese        Social History:   Social History     Tobacco Use    Smoking status: Never    Smokeless tobacco: Never    Tobacco comments:     Lives in smoke free household   Substance Use Topics    Alcohol use: No     Alcohol/week: 0.0 standard drinks of alcohol    Drug use: No       Family History:  Deferred    Objective:  Visual Examination via Video:  Pleasant in no acute distress.  Normal work of breathing   A+O x 3    Labs:  CBC RESULTS:   Recent Labs   Lab Test 10/21/24  0802   WBC 7.2   RBC 5.03   HGB 15.6   HCT 45.5   MCV 91   MCH 31.0   MCHC 34.3   RDW 12.2        Last Comprehensive Metabolic  Panel:  Sodium   Date Value Ref Range Status   10/21/2024 138 135 - 145 mmol/L Final   11/23/2020 138 133 - 144 mmol/L Final     Potassium   Date Value Ref Range Status   10/21/2024 3.8 3.4 - 5.3 mmol/L Final   11/05/2022 4.2 3.4 - 5.3 mmol/L Final   11/23/2020 4.0 3.4 - 5.3 mmol/L Final     Chloride   Date Value Ref Range Status   10/21/2024 101 98 - 107 mmol/L Final   11/05/2022 109 94 - 109 mmol/L Final   11/23/2020 106 94 - 109 mmol/L Final     Carbon Dioxide   Date Value Ref Range Status   11/23/2020 29 20 - 32 mmol/L Final     Carbon Dioxide (CO2)   Date Value Ref Range Status   10/21/2024 25 22 - 29 mmol/L Final   11/05/2022 28 20 - 32 mmol/L Final     Anion Gap   Date Value Ref Range Status   10/21/2024 12 7 - 15 mmol/L Final   11/05/2022 4 3 - 14 mmol/L Final   11/23/2020 3 3 - 14 mmol/L Final     Glucose   Date Value Ref Range Status   10/21/2024 88 70 - 99 mg/dL Final   11/05/2022 90 70 - 99 mg/dL Final   11/23/2020 89 70 - 99 mg/dL Final     Comment:     Non Fasting     Urea Nitrogen   Date Value Ref Range Status   10/21/2024 9.3 6.0 - 20.0 mg/dL Final   11/05/2022 11 7 - 30 mg/dL Final   11/23/2020 14 7 - 30 mg/dL Final     Creatinine   Date Value Ref Range Status   10/21/2024 0.90 0.51 - 0.95 mg/dL Final   11/23/2020 0.84 0.52 - 1.04 mg/dL Final     GFR Estimate   Date Value Ref Range Status   10/21/2024 78 >60 mL/min/1.73m2 Final     Comment:     eGFR calculated using 2021 CKD-EPI equation.   11/23/2020 83 >60 mL/min/[1.73_m2] Final     Comment:     Non  GFR Calc  Starting 12/18/2018, serum creatinine based estimated GFR (eGFR) will be   calculated using the Chronic Kidney Disease Epidemiology Collaboration   (CKD-EPI) equation.       Calcium   Date Value Ref Range Status   10/21/2024 9.5 8.8 - 10.4 mg/dL Final     Comment:     Reference intervals for this test were updated on 7/16/2024 to reflect our healthy population more accurately. There may be differences in the flagging of prior  results with similar values performed with this method. Those prior results can be interpreted in the context of the updated reference intervals.   11/23/2020 9.0 8.5 - 10.1 mg/dL Final     Liver Function Studies -   Recent Labs   Lab Test 10/21/24  0802   PROTTOTAL 7.7   ALBUMIN 4.1   BILITOTAL 0.6   ALKPHOS 54   AST 29   ALT 16         Imaging:  No results found for this or any previous visit (from the past 744 hours).     Assessment:  In summary, Jesica Mooney is a 49 year old female with a history of:  1. History of pulmonary embolism 12/2015 - on long term OCPs since discontinued. Remains on Xarelto 20mg once daily.   2. Antithrombin III deficiency, with pathogenic SERPINC gene mutation   3. Sjogren's followed by rheumatology   4. Nephrolithiasis  5. Overdue for colon cancer screening.        Plan:  Gretchen remains a good candidate for long term anticoagulation with Xarelto. I will refill her prescription for the next year. She is on savings card program.   Annual CBC and CMP. She typically has these done at her rheumatology appointments.  She did not know that colonoscopy screenings now recommended at age 45. She just turned 49 this last year and has not had colon cancer screening. I recommended she establish care with PCP for referral.  She will call if she has any planned surgeries or procedures. If colonoscopy scheduled, hold Xarelto x 48 hours prior to procedure, can resume after procedure if no polyps, biopsies taken. If they are, resume the following day.     Follow up in 1 year.         Video-Visit Details:  Type of service:  Video Visit  Joined the call at 5/1/2025, 2:01:25 pm.  Left the call at 5/1/2025, 2:16:25 pm.  You were on the call for 14 minutes 59 seconds.  Originating Location (pt. Location): Home  Distant Location (provider location):  [facility] [place] [facility]   Mode of Communication:  Video Conference via Alexandre Trejo, MPH,  [place]  [facility]    20 minutes spent by me on the date of the encounter doing chart review, review of outside records, review of test results, interpretation of tests, patient visit, and documentation

## 2025-05-12 ENCOUNTER — RESULTS FOLLOW-UP (OUTPATIENT)
Dept: UROLOGY | Facility: CLINIC | Age: 50
End: 2025-05-12

## 2025-05-12 ENCOUNTER — ANCILLARY PROCEDURE (OUTPATIENT)
Dept: CT IMAGING | Facility: CLINIC | Age: 50
End: 2025-05-12
Attending: NURSE PRACTITIONER
Payer: COMMERCIAL

## 2025-05-12 DIAGNOSIS — N20.0 KIDNEY STONE: ICD-10-CM

## 2025-05-12 PROCEDURE — 74176 CT ABD & PELVIS W/O CONTRAST: CPT | Mod: TC | Performed by: RADIOLOGY

## 2025-06-03 SDOH — HEALTH STABILITY: PHYSICAL HEALTH: ON AVERAGE, HOW MANY MINUTES DO YOU ENGAGE IN EXERCISE AT THIS LEVEL?: 60 MIN

## 2025-06-03 SDOH — HEALTH STABILITY: PHYSICAL HEALTH: ON AVERAGE, HOW MANY DAYS PER WEEK DO YOU ENGAGE IN MODERATE TO STRENUOUS EXERCISE (LIKE A BRISK WALK)?: 1 DAY

## 2025-06-03 ASSESSMENT — SOCIAL DETERMINANTS OF HEALTH (SDOH): HOW OFTEN DO YOU GET TOGETHER WITH FRIENDS OR RELATIVES?: ONCE A WEEK

## 2025-06-04 ENCOUNTER — OFFICE VISIT (OUTPATIENT)
Dept: FAMILY MEDICINE | Facility: CLINIC | Age: 50
End: 2025-06-04
Payer: COMMERCIAL

## 2025-06-04 VITALS
BODY MASS INDEX: 39.87 KG/M2 | SYSTOLIC BLOOD PRESSURE: 108 MMHG | OXYGEN SATURATION: 99 % | HEART RATE: 78 BPM | HEIGHT: 63 IN | RESPIRATION RATE: 16 BRPM | TEMPERATURE: 96 F | WEIGHT: 225 LBS | DIASTOLIC BLOOD PRESSURE: 82 MMHG

## 2025-06-04 DIAGNOSIS — Z12.4 CERVICAL CANCER SCREENING: ICD-10-CM

## 2025-06-04 DIAGNOSIS — Z13.1 SCREENING FOR DIABETES MELLITUS: ICD-10-CM

## 2025-06-04 DIAGNOSIS — Z12.11 SCREEN FOR COLON CANCER: ICD-10-CM

## 2025-06-04 DIAGNOSIS — Z13.29 SCREENING FOR THYROID DISORDER: ICD-10-CM

## 2025-06-04 DIAGNOSIS — Z13.220 SCREENING CHOLESTEROL LEVEL: ICD-10-CM

## 2025-06-04 DIAGNOSIS — Z00.00 ROUTINE GENERAL MEDICAL EXAMINATION AT A HEALTH CARE FACILITY: Primary | ICD-10-CM

## 2025-06-04 LAB
CHOLEST SERPL-MCNC: 174 MG/DL
EST. AVERAGE GLUCOSE BLD GHB EST-MCNC: 111 MG/DL
FASTING STATUS PATIENT QL REPORTED: YES
FASTING STATUS PATIENT QL REPORTED: YES
GLUCOSE SERPL-MCNC: 94 MG/DL (ref 70–99)
HBA1C MFR BLD: 5.5 % (ref 0–5.6)
HDLC SERPL-MCNC: 48 MG/DL
HPV HR 12 DNA CVX QL NAA+PROBE: NEGATIVE
HPV16 DNA CVX QL NAA+PROBE: NEGATIVE
HPV18 DNA CVX QL NAA+PROBE: NEGATIVE
HUMAN PAPILLOMA VIRUS FINAL DIAGNOSIS: NORMAL
LDLC SERPL CALC-MCNC: 113 MG/DL
NONHDLC SERPL-MCNC: 126 MG/DL
TRIGL SERPL-MCNC: 65 MG/DL
TSH SERPL DL<=0.005 MIU/L-ACNC: 2.79 UIU/ML (ref 0.3–4.2)

## 2025-06-04 PROCEDURE — 84443 ASSAY THYROID STIM HORMONE: CPT | Performed by: NURSE PRACTITIONER

## 2025-06-04 PROCEDURE — 3079F DIAST BP 80-89 MM HG: CPT | Performed by: NURSE PRACTITIONER

## 2025-06-04 PROCEDURE — 80061 LIPID PANEL: CPT | Performed by: NURSE PRACTITIONER

## 2025-06-04 PROCEDURE — 87624 HPV HI-RISK TYP POOLED RSLT: CPT | Performed by: NURSE PRACTITIONER

## 2025-06-04 PROCEDURE — 99386 PREV VISIT NEW AGE 40-64: CPT | Performed by: NURSE PRACTITIONER

## 2025-06-04 PROCEDURE — 36415 COLL VENOUS BLD VENIPUNCTURE: CPT | Performed by: NURSE PRACTITIONER

## 2025-06-04 PROCEDURE — 3074F SYST BP LT 130 MM HG: CPT | Performed by: NURSE PRACTITIONER

## 2025-06-04 PROCEDURE — 3044F HG A1C LEVEL LT 7.0%: CPT | Performed by: NURSE PRACTITIONER

## 2025-06-04 PROCEDURE — 82947 ASSAY GLUCOSE BLOOD QUANT: CPT | Performed by: NURSE PRACTITIONER

## 2025-06-04 PROCEDURE — 83036 HEMOGLOBIN GLYCOSYLATED A1C: CPT | Performed by: NURSE PRACTITIONER

## 2025-06-04 NOTE — PATIENT INSTRUCTIONS
Patient Education   Preventive Care Advice   This is general advice given by our system to help you stay healthy. However, your care team may have specific advice just for you. Please talk to your care team about your preventive care needs.  Nutrition  Eat 5 or more servings of fruits and vegetables each day.  Try wheat bread, brown rice and whole grain pasta (instead of white bread, rice, and pasta).  Get enough calcium and vitamin D. Check the label on foods and aim for 100% of the RDA (recommended daily allowance).  Lifestyle  Exercise at least 150 minutes each week  (30 minutes a day, 5 days a week).  Do muscle strengthening activities 2 days a week. These help control your weight and prevent disease.  No smoking.  Wear sunscreen to prevent skin cancer.  Have a dental exam and cleaning every 6 months.  Yearly exams  See your health care team every year to talk about:  Any changes in your health.  Any medicines your care team has prescribed.  Preventive care, family planning, and ways to prevent chronic diseases.  Shots (vaccines)   HPV shots (up to age 26), if you've never had them before.  Hepatitis B shots (up to age 59), if you've never had them before.  COVID-19 shot: Get this shot when it's due.  Flu shot: Get a flu shot every year.  Tetanus shot: Get a tetanus shot every 10 years.  Pneumococcal, hepatitis A, and RSV shots: Ask your care team if you need these based on your risk.  Shingles shot (for age 50 and up)  General health tests  Diabetes screening:  Starting at age 35, Get screened for diabetes at least every 3 years.  If you are younger than age 35, ask your care team if you should be screened for diabetes.  Cholesterol test: At age 39, start having a cholesterol test every 5 years, or more often if advised.  Bone density scan (DEXA): At age 50, ask your care team if you should have this scan for osteoporosis (brittle bones).  Hepatitis C: Get tested at least once in your life.  STIs (sexually  transmitted infections)  Before age 24: Ask your care team if you should be screened for STIs.  After age 24: Get screened for STIs if you're at risk. You are at risk for STIs (including HIV) if:  You are sexually active with more than one person.  You don't use condoms every time.  You or a partner was diagnosed with a sexually transmitted infection.  If you are at risk for HIV, ask about PrEP medicine to prevent HIV.  Get tested for HIV at least once in your life, whether you are at risk for HIV or not.  Cancer screening tests  Cervical cancer screening: If you have a cervix, begin getting regular cervical cancer screening tests starting at age 21.  Breast cancer scan (mammogram): If you've ever had breasts, begin having regular mammograms starting at age 40. This is a scan to check for breast cancer.  Colon cancer screening: It is important to start screening for colon cancer at age 45.  Have a colonoscopy test every 10 years (or more often if you're at risk) Or, ask your provider about stool tests like a FIT test every year or Cologuard test every 3 years.  To learn more about your testing options, visit:   .  For help making a decision, visit:   https://bit.ly/ne71751.  Prostate cancer screening test: If you have a prostate, ask your care team if a prostate cancer screening test (PSA) at age 55 is right for you.  Lung cancer screening: If you are a current or former smoker ages 50 to 80, ask your care team if ongoing lung cancer screenings are right for you.  For informational purposes only. Not to replace the advice of your health care provider. Copyright   2023 [employer]. All rights reserved. Clinically reviewed by the [facility] Transitions Program. [employer] 438707 - REV 01/24.

## 2025-06-04 NOTE — PROGRESS NOTES
"Preventive Care Visit  [facility] ESSIE DOMNIGUEZ, Family Medicine  Jun 4, 2025      Assessment & Plan     Routine general medical examination at a health care facility      Cervical cancer screening  Remote hx of abnormal pap  - HPV and Gynecologic Cytology Panel - Recommended Age 30 - 65 Years    Screen for colon cancer  Due for colon cancer screening, in agreement to colonoscopy  - Colonoscopy Screening  Referral; Future    Screening for diabetes mellitus  Due for screening labs  - Glucose; Future  - Hemoglobin A1c; Future    Screening for thyroid disorder  Due for screening labs  - TSH with free T4 reflex; Future    Screening cholesterol level  Due for screening labs  - Lipid panel reflex to direct LDL Fasting; Future    BMI 40.0-44.9, adult (H)  Discussed walking, getting heart rate up 10 minutes per day, finding activity she enjoys.    Patient has been advised of split billing requirements and indicates understanding: Yes        BMI  Estimated body mass index is 40.37 kg/m  as calculated from the following:    Height as of this encounter: 1.59 m (5' 2.6\").    Weight as of this encounter: 102.1 kg (225 lb).   Weight management plan: Discussed healthy diet and exercise guidelines Discussed walking, getting heart rate up 10 minutes per day, finding activity she enjoys.    Counseling  Appropriate preventive services were addressed with this patient via screening, questionnaire, or discussion as appropriate for fall prevention, nutrition, physical activity, Tobacco-use cessation, social engagement, weight loss and cognition.  Checklist reviewing preventive services available has been given to the patient.  Reviewed patient's diet, addressing concerns and/or questions.   She is at risk for lack of exercise and has been provided with information to increase physical activity for the benefit of her well-being.       Follow-up  Return in about 1 year (around 6/4/2026), or if " symptoms worsen or fail to improve.    Alexia Godinez is a 49 year old, presenting for the following:  Establish Care, Referral (IUD concerns), and Physical        6/4/2025     7:02 AM   Additional Questions   Roomed by Candida Wong CMA        Via the Health Maintenance questionnaire, the patient has reported the following services have been completed -Eye Exam: [facility] with Dr. Minnie Akers 2024-05-20, this information has not been sent to the abstraction team.    HPI    Advance Care Planning    Discussed advance care planning with patient; informed AVS has link to [employer].        6/3/2025   General Health   How would you rate your overall physical health? Good   Feel stress (tense, anxious, or unable to sleep) Only a little   (!) STRESS CONCERN      6/3/2025   Nutrition   Three or more servings of calcium each day? (!) NO   Diet: Regular (no restrictions)   How many servings of fruit and vegetables per day? (!) 2-3   How many sweetened beverages each day? 0-1         6/3/2025   Exercise   Days per week of moderate/strenous exercise 1 day   Average minutes spent exercising at this level 60 min   (!) EXERCISE CONCERN      6/3/2025   Social Factors   Frequency of gathering with friends or relatives Once a week   Worry food won't last until get money to buy more No   Food not last or not have enough money for food? No   Do you have housing? (Housing is defined as stable permanent housing and does not include staying outside in a car, in a tent, in an abandoned building, in an overnight shelter, or couch-surfing.) Yes   Are you worried about losing your housing? No   Lack of transportation? No   Unable to get utilities (heat,electricity)? No         6/3/2025   Fall Risk   Fallen 2 or more times in the past year? No   Trouble with walking or balance? No          6/3/2025   Dental   Dentist two times every year? Yes         Today's PHQ-2 Score:       6/3/2025     2:11 PM   PHQ-2 ( 1999 [employer])   Q1:  Little interest or pleasure in doing things 0   Q2: Feeling down, depressed or hopeless 0   PHQ-2 Score 0    Q1: Little interest or pleasure in doing things Not at all   Q2: Feeling down, depressed or hopeless Not at all   PHQ-2 Score 0       Patient-reported           6/3/2025   Substance Use   Alcohol more than 3/day or more than 7/wk No   Do you use any other substances recreationally? No     Social History     Tobacco Use    Smoking status: Never    Smokeless tobacco: Never    Tobacco comments:     Lives in smoke free household   Vaping Use    Vaping status: Never Used   Substance Use Topics    Alcohol use: No    Drug use: No           2/24/2025   LAST FHS-7 RESULTS   1st degree relative breast or ovarian cancer No   Any relative bilateral breast cancer No   Any male have breast cancer No   Any ONE woman have BOTH breast AND ovarian cancer No   Any woman with breast cancer before 50yrs No   2 or more relatives with breast AND/OR ovarian cancer No   2 or more relatives with breast AND/OR bowel cancer No        Mammogram Screening - Mammogram every 1-2 years updated in Health Maintenance based on mutual decision making        6/3/2025   STI Screening   New sexual partner(s) since last STI/HIV test? No     History of abnormal Pap smear: No - age 30- 64 PAP with HPV every 5 years recommended        Latest Ref Rng & Units 11/26/2018    12:27 PM 11/26/2018    12:23 PM 10/5/2015     8:45 AM   PAP / HPV   PAP (Historical)   NIL     HPV 16 DNA NEG^Negative Negative   Negative    HPV 18 DNA NEG^Negative Negative   Negative    Other HR HPV NEG^Negative Negative   Negative      ASCVD Risk   The 10-year ASCVD risk score (Iesha MAHAJAN, et al., 2019) is: 1%    Values used to calculate the score:      Age: 49 years      Sex: Female      Is Non- : No      Diabetic: No      Tobacco smoker: No      Systolic Blood Pressure: 108 mmHg      Is BP treated: No      HDL Cholesterol: 45 mg/dL      Total  Cholesterol: 181 mg/dL            6/3/2025   Contraception/Family Planning   Questions about contraception or family planning (!) YES         Reviewed and updated as needed this visit by Provider                    Past Medical History:   Diagnosis Date    Cavernous angioma     Cerebral cavernous malformation     Dementia (H)     Encephalitis     Head injury     History of stroke with residual effects     Intracerebral hemorrhage (H)     IUD (intrauterine device) in place     Mirena inserted 12/28/15    Meningitis     Migraines     Obese     Other nervous system complications     Seizures (H)      Past Surgical History:   Procedure Laterality Date    BIOPSY  a fews years ago    A mole was removed from by scalp    LASER HOLMIUM LITHOTRIPSY URETER(S), INSERT STENT, COMBINED Left 2022    Procedure: LEFT CYSTOURETEROSCOPY, WITH LITHOTRIPSY USING LASER AND URETERAL STENT INSERTION;  Surgeon: Jesus Roper MD;  Location: [facility] [place]    [facility] ORAL SURGERY PROCEDURE  10/1990    wisdom teeth extracted     OB History    Para Term  AB Living   2 2 2 0 0 2   SAB IAB Ectopic Multiple Live Births   0 0 0 0 2      # Outcome Date GA Lbr Femi/2nd Weight Sex Type Anes PTL Lv   2 Term  41w0d  4.082 kg (9 lb) F    ISREAL      Birth Comments: uncomplicated      Name: Renea   1 Term  39w0d  3.402 kg (7 lb 8 oz) M    ISREAL      Birth Comments: uncomplicated      Name: Wali     Lab work is in process  Labs reviewed in EPIC  BP Readings from Last 3 Encounters:   25 108/82   10/28/24 109/77   24 113/79    Wt Readings from Last 3 Encounters:   25 102.1 kg (225 lb)   25 99.8 kg (220 lb)   10/28/24 99.1 kg (218 lb 6.4 oz)                  Patient Active Problem List   Diagnosis    BMI 40.0-44.9, adult (H)    CARDIOVASCULAR SCREENING; LDL GOAL LESS THAN 160    Other acute pulmonary embolism    Sjogren's syndrome    Screening for cervical cancer    Obstruction of left ureteropelvic junction  due to stone    Acute left-sided low back pain without sciatica     Past Surgical History:   Procedure Laterality Date    BIOPSY  a fews years ago    A mole was removed from by scalp    LASER HOLMIUM LITHOTRIPSY URETER(S), INSERT STENT, COMBINED Left 7/20/2022    Procedure: LEFT CYSTOURETEROSCOPY, WITH LITHOTRIPSY USING LASER AND URETERAL STENT INSERTION;  Surgeon: Jesus Roper MD;  [place] [facility] [place]    [facility] SURGERY PROCEDURE  10/1990    wisdom teeth extracted       Social History     Tobacco Use    Smoking status: Never    Smokeless tobacco: Never    Tobacco comments:     Lives in smoke free household   Substance Use Topics    Alcohol use: No     Family History   Problem Relation Age of Onset    Eye Disorder Mother         cataracts     Rheumatoid Arthritis Mother     Cancer - colorectal Paternal Grandmother         ?    Colon Cancer Paternal Grandmother     Thyroid Disease Father     Breast Cancer Maternal Aunt         diagnosed in 30s (mother's 1/2 sister)    Cancer No family hx of     Diabetes No family hx of     Hypertension No family hx of     Cerebrovascular Disease No family hx of     Macular Degeneration No family hx of     Glaucoma No family hx of          Current Outpatient Medications   Medication Sig Dispense Refill    cevimeline (EVOXAC) 30 MG capsule Take 1 capsule (30 mg) by mouth 2 times daily. 180 capsule 3    hydrochlorothiazide (HYDRODIURIL) 25 MG tablet Take 1 tablet (25 mg) by mouth daily. 90 tablet 3    levonorgestrel (MIRENA) 20 MCG/DAY IUD 1 each by Intrauterine route once      polyethylene glycol-propylene glycol (SYSTANE ULTRA) 0.4-0.3 % SOLN ophthalmic solution Place 1 drop into both eyes 2 times daily as needed for dry eyes      rivaroxaban ANTICOAGULANT (XARELTO ANTICOAGULANT) 20 MG TABS tablet TAKE 1 TABLET(20 MG) BY MOUTH DAILY WITH DINNER 90 tablet 3     Allergies   Allergen Reactions    Keflex [Cephalexin Hcl] Hives    Penicillins Hives    Betadine [Povidone Iodine]  "Itching     Possibly itchiness, no hives, uncertain allergic reaction.     Recent Labs   Lab Test 10/21/24  0802 07/31/24  0736 10/16/23  0744 11/05/22  1026 11/05/22  1026 11/21/21  1016 11/23/20  0853 11/25/19  0837   LDL  --   --   --   --   --   --  113*  --    HDL  --   --   --   --   --   --  45*  --    TRIG  --   --   --   --   --   --  115  --    ALT 16  --  20  --  45   < > 46 36   CR 0.90 0.82 0.85  --  0.75   < > 0.84 0.78   GFRESTIMATED 78 87 84  --  >90   < > 83 >90   GFRESTBLACK  --   --   --   --   --   --  >90 >90   POTASSIUM 3.8 4.3 4.4   < > 4.2   < > 4.0 4.2    < > = values in this interval not displayed.               Review of Systems  CONSTITUTIONAL: NEGATIVE for fever, chills, change in weight  INTEGUMENTARY/SKIN: NEGATIVE for worrisome rashes, moles or lesions  EYES: NEGATIVE for vision changes or irritation  ENT/MOUTH: NEGATIVE for ear, mouth and throat problems  RESP: NEGATIVE for significant cough or SOB  BREAST: NEGATIVE for masses, tenderness or discharge  CV: NEGATIVE for chest pain, palpitations or peripheral edema  GI: NEGATIVE for nausea, abdominal pain, heartburn, or change in bowel habits  : NEGATIVE for frequency, dysuria, or hematuria  MUSCULOSKELETAL: NEGATIVE for significant arthralgias or myalgia  NEURO: NEGATIVE for weakness, dizziness or paresthesias  ENDOCRINE: NEGATIVE for temperature intolerance, skin/hair changes  HEME: NEGATIVE for bleeding problems  PSYCHIATRIC: NEGATIVE for changes in mood or affect     Objective    Exam  /82 (BP Location: Left arm, Patient Position: Sitting, Cuff Size: Adult Regular)   Pulse 78   Temp (!) 96  F (35.6  C) (Temporal)   Resp 16   Ht 1.59 m (5' 2.6\")   Wt 102.1 kg (225 lb)   LMP 05/24/2025 (Exact Date)   SpO2 99%   BMI 40.37 kg/m     Estimated body mass index is 40.37 kg/m  as calculated from the following:    Height as of this encounter: 1.59 m (5' 2.6\").    Weight as of this encounter: 102.1 kg (225 lb).    Physical " Exam  GENERAL: alert and no distress  EYES: Eyes grossly normal to inspection, PERRL and conjunctivae and sclerae normal  HENT: ear canals and TM's normal, nose and mouth without ulcers or lesions  NECK: no adenopathy, no asymmetry, masses, or scars  RESP: lungs clear to auscultation - no rales, rhonchi or wheezes  BREAST: deferred per guidelines, asymptomatic per pt, discussed SBE, symptoms to watch out for and when to follow up, discussed mammogram  CV: regular rate and rhythm, normal S1 S2, no S3 or S4, no murmur, click or rub, no peripheral edema  ABDOMEN: soft, nontender, no hepatosplenomegaly, no masses and bowel sounds normal   (female): normal female external genitalia, normal urethral meatus, normal vaginal mucosa  MS: no gross musculoskeletal defects noted, no edema, no CVA tenderness  SKIN: no suspicious lesions or rashes  NEURO: Normal strength and tone, cranial nerves intact, mentation intact and speech normal  PSYCH: mentation appears normal, affect normal/bright  LYMPH: no cervical, supraclavicular, axillary, or inguinal adenopathy    See orders    Signed Electronically by: ESSIE TAVERA

## 2025-06-05 ENCOUNTER — RESULTS FOLLOW-UP (OUTPATIENT)
Dept: OBGYN | Facility: CLINIC | Age: 50
End: 2025-06-05

## 2025-06-06 ENCOUNTER — TELEPHONE (OUTPATIENT)
Dept: GASTROENTEROLOGY | Facility: CLINIC | Age: 50
End: 2025-06-06
Payer: COMMERCIAL

## 2025-06-06 ENCOUNTER — HOSPITAL ENCOUNTER (OUTPATIENT)
Facility: AMBULATORY SURGERY CENTER | Age: 50
End: 2025-06-06
Attending: COLON & RECTAL SURGERY
Payer: COMMERCIAL

## 2025-06-06 DIAGNOSIS — Z12.11 SPECIAL SCREENING FOR MALIGNANT NEOPLASMS, COLON: Primary | ICD-10-CM

## 2025-06-06 NOTE — TELEPHONE ENCOUNTER
Bowel Prep Review:  Disclaimer: No call was made to the patient.     Extended Golytely bowel prep. Bowel prep sent to    [facility] [place] - [address] [facility]  Recommended due to BMI > 40.   Instructions were sent via Tomo Clasesmikey Franklin LPN  Endoscopy Procedure Pre Assessment

## 2025-06-09 LAB
BKR AP ASSOCIATED HPV REPORT: NORMAL
BKR LAB AP GYN ADEQUACY: NORMAL
BKR LAB AP GYN INTERPRETATION: NORMAL
BKR LAB AP LMP: NORMAL
BKR LAB AP PREVIOUS ABNORMAL: NORMAL
PATH REPORT.COMMENTS IMP SPEC: NORMAL
PATH REPORT.COMMENTS IMP SPEC: NORMAL
PATH REPORT.RELEVANT HX SPEC: NORMAL

## 2025-06-09 RX ORDER — BISACODYL 5 MG/1
TABLET, DELAYED RELEASE ORAL
Qty: 4 TABLET | Refills: 0 | Status: SHIPPED | OUTPATIENT
Start: 2025-06-09

## 2025-06-09 NOTE — TELEPHONE ENCOUNTER
Outgoing call to patient to complete PA call. Patient states that she is at work right now and can't talk. She will return a call later to complete PA.    Cheli Franklin LPN on 6/9/2025 at 8:47 AM

## 2025-06-09 NOTE — TELEPHONE ENCOUNTER
Pre visit planning completed.      Procedure details:    Patient scheduled for Colonoscopy on 6/24/25.     Approximate arrival time: 0630. Procedure time 0700.   *Ensure patient is aware that endoscopy team will be calling about 2 days prior to procedure date to confirm arrival time as this may change.     Facility location: [facility]; [address], [place] [address]. Check in location: 2nd Floor at Surgery desk.  *Disclaimer: Drivers are to check in with patient and stay on campus during procedure.     Sedation type: Conscious sedation     Pre op exam needed? No.    Indication for procedure: Screening       Chart review:     Electronic implanted devices? No    Recent diagnosis of diverticulitis within the last 6 weeks? No      Medication review:    Diabetic? No    Anticoagulants? Yes Rivaroxaban (Xarelto): Recommended HOLD 2 days before procedure.  Consult with your managing provider.    Weight loss medication/injectable? No GLP-1 medication per patient's medication list. Nursing to verify with pre-assessment call.    Other medication HOLDING recommendations:  N/A      Prep for procedure:     Bowel prep recommendation: Extended Golytely. Bowel prep sent to    [facility] - [address] [facility] [place]  Due to: BMI > 40    Procedure information and instructions sent via victor m Baptiste RN  Endoscopy Procedure Pre Assessment   [phone] option 3

## 2025-06-11 ENCOUNTER — TELEPHONE (OUTPATIENT)
Dept: GASTROENTEROLOGY | Facility: CLINIC | Age: 50
End: 2025-06-11
Payer: COMMERCIAL

## 2025-06-11 NOTE — TELEPHONE ENCOUNTER
Patient called this morning she needs to reschedule her appointment, warm transferred patient to [place] in scheduling.  Analisa Salcedo LPN

## 2025-06-11 NOTE — TELEPHONE ENCOUNTER
Caller: Gretchen,    Reason for Reschedule/Cancellation (please be detailed, any staff messages or encounters to note?):   Conflict with pt schedule     Did you cancel or rescheduled an EUS procedure? No.    Is screening questionnaire older than 3 months from the reschedule date.   If Yes, please complete screening questionnaire. No    Prior to reschedule please review:  Ordering Provider: AMALIA   Sedation Determined: moderate  Does patient have any ASC Exclusions, please identify?: n    Notes on Cancelled Procedure:  Procedure: Lower Endoscopy [Colonoscopy]   Date: 6/24  Location: [facility]; [address], [place] [address]   Surgeon: justino    Rescheduled: No, pt will call to r/s once she has time

## 2025-06-11 NOTE — TELEPHONE ENCOUNTER
Second call attempt to complete pre assessment.     No answer. Left message to return call to [phone] #3 by next business day prior to 4PM.    Callback communication sent via [employer].      Cheli Franklin LPN  Endoscopy Procedure Pre Assessment

## 2025-06-17 ENCOUNTER — PRE VISIT (OUTPATIENT)
Dept: UROLOGY | Facility: CLINIC | Age: 50
End: 2025-06-17
Payer: COMMERCIAL

## 2025-06-17 NOTE — TELEPHONE ENCOUNTER
Reason for visit: follow-up     Relevant information: 6 month, review CT scans. CT completed on 05/12/2025. Note from Amaya on pts chart: One thing I noticed on the CT is that your IUD looks malpositioned. Please reach out to your PCP or gyn regarding repositioning     Records/imaging/labs/orders: all records available    Joleen Mendoza  6/17/2025  8:15 AM

## 2025-07-10 ENCOUNTER — PATIENT OUTREACH (OUTPATIENT)
Dept: CARE COORDINATION | Facility: CLINIC | Age: 50
End: 2025-07-10
Payer: COMMERCIAL

## 2025-07-21 ENCOUNTER — TELEPHONE (OUTPATIENT)
Dept: GASTROENTEROLOGY | Facility: CLINIC | Age: 50
End: 2025-07-21
Payer: COMMERCIAL

## 2025-07-21 NOTE — TELEPHONE ENCOUNTER
Rescheduled Colonoscopy  Due to patient requested another day/time.    Pre visit planning completed.      Procedure details:    Patient scheduled for Colonoscopy on 8/12/25.     Approximate arrival time: 0645. Procedure time 0730.   *Ensure patient is aware that endoscopy team will be calling about 2 days prior to procedure date to confirm arrival time as this may change.     Facility location: [facility]; [address], [place] [address]. Check in location: 2nd Floor at Surgery desk.  *Disclaimer: Drivers are to check in with patient and stay on campus during procedure.     Sedation type: Conscious sedation     Pre op exam needed? No.    Indication for procedure: screening      Chart review:     Electronic implanted devices? No    Recent diagnosis of diverticulitis within the last 6 weeks? No      Medication review:    Diabetic? No    Anticoagulants? Yes Rivaroxaban (Xarelto): Recommended HOLD 2 days before procedure.  Consult with your managing provider.    Weight loss medication/injectable? No GLP-1 medication per patient's medication list. Nursing to verify with pre-assessment call.    Other medication HOLDING recommendations:  N/A      Prep for procedure:     Bowel prep recommendation: Extended Golytely. Bowel prep sent to    [facility] - [address] [facility] [place]     **Sent 6/6/25 - did patient ?    Due to: BMI > 40    Prep instructions sent via [employer] Corinne Kliber, RN  Endoscopy Procedure Pre Assessment   [phone] option 3

## 2025-07-22 NOTE — TELEPHONE ENCOUNTER
Pre assessment completed for upcoming procedure.   (Please see previous telephone encounter notes for complete details)    I called and spoke with patient       Procedure details:    Procedure date 8/12, approximate arrival time 0645 and facility location reviewed.   Patient is aware that endoscopy team will be calling about 2 days prior to confirm arrival time.    Designated  policy reviewed and that site requests drivers to check in and stay on campus. Instructed to have someone stay 6  hours post procedure.   *Disclaimer - please notify the [facility] RN GI staff with any  issues/concerns.    Medication review:    Medications reviewed. Please see supporting documentation below. Holding recommendations discussed (if applicable).       Prep for procedure:     Procedure prep instructions reviewed.        Any additional information needed:  N/A      Patient verbalized understanding and had no questions or concerns at this time.      Cheli Franklin LPN  Endoscopy Procedure Pre Assessment   [phone] option 3

## 2025-08-12 ENCOUNTER — HOSPITAL ENCOUNTER (OUTPATIENT)
Facility: AMBULATORY SURGERY CENTER | Age: 50
Discharge: HOME | End: 2025-08-12
Attending: SURGERY | Admitting: SURGERY
Payer: COMMERCIAL

## 2025-08-12 VITALS
OXYGEN SATURATION: 94 % | RESPIRATION RATE: 16 BRPM | DIASTOLIC BLOOD PRESSURE: 86 MMHG | SYSTOLIC BLOOD PRESSURE: 105 MMHG | TEMPERATURE: 97.7 F | HEART RATE: 66 BPM

## 2025-08-12 LAB — COLONOSCOPY: NORMAL

## 2025-08-12 RX ORDER — PROCHLORPERAZINE MALEATE 10 MG
10 TABLET ORAL EVERY 6 HOURS PRN
Status: CANCELLED | OUTPATIENT
Start: 2025-08-12

## 2025-08-12 RX ORDER — NALOXONE HYDROCHLORIDE 0.4 MG/ML
0.2 INJECTION, SOLUTION INTRAMUSCULAR; INTRAVENOUS; SUBCUTANEOUS
Status: CANCELLED | OUTPATIENT
Start: 2025-08-12

## 2025-08-12 RX ORDER — FLUMAZENIL 0.1 MG/ML
0.2 INJECTION, SOLUTION INTRAVENOUS
Status: CANCELLED | OUTPATIENT
Start: 2025-08-12 | End: 2025-08-12

## 2025-08-12 RX ORDER — LIDOCAINE 40 MG/G
CREAM TOPICAL
Status: DISCONTINUED | OUTPATIENT
Start: 2025-08-12 | End: 2025-08-13 | Stop reason: HOSPADM

## 2025-08-12 RX ORDER — ONDANSETRON 4 MG/1
4 TABLET, ORALLY DISINTEGRATING ORAL EVERY 6 HOURS PRN
Status: CANCELLED | OUTPATIENT
Start: 2025-08-12

## 2025-08-12 RX ORDER — ONDANSETRON 2 MG/ML
4 INJECTION INTRAMUSCULAR; INTRAVENOUS EVERY 6 HOURS PRN
Status: CANCELLED | OUTPATIENT
Start: 2025-08-12

## 2025-08-12 RX ORDER — NALOXONE HYDROCHLORIDE 0.4 MG/ML
0.4 INJECTION, SOLUTION INTRAMUSCULAR; INTRAVENOUS; SUBCUTANEOUS
Status: CANCELLED | OUTPATIENT
Start: 2025-08-12

## 2025-08-12 RX ORDER — ONDANSETRON 2 MG/ML
4 INJECTION INTRAMUSCULAR; INTRAVENOUS
Status: DISCONTINUED | OUTPATIENT
Start: 2025-08-12 | End: 2025-08-13 | Stop reason: HOSPADM

## 2025-08-12 RX ORDER — FENTANYL CITRATE 50 UG/ML
INJECTION, SOLUTION INTRAMUSCULAR; INTRAVENOUS PRN
Status: DISCONTINUED | OUTPATIENT
Start: 2025-08-12 | End: 2025-08-12 | Stop reason: HOSPADM

## 2025-08-14 LAB
PATH REPORT.COMMENTS IMP SPEC: NORMAL
PATH REPORT.COMMENTS IMP SPEC: NORMAL
PATH REPORT.FINAL DX SPEC: NORMAL
PATH REPORT.GROSS SPEC: NORMAL
PATH REPORT.MICROSCOPIC SPEC OTHER STN: NORMAL
PATH REPORT.RELEVANT HX SPEC: NORMAL
PHOTO IMAGE: NORMAL

## 2025-08-26 PROBLEM — D12.6 TUBULAR ADENOMA OF COLON: Status: ACTIVE | Noted: 2025-08-26

## 2025-08-27 ENCOUNTER — PATIENT OUTREACH (OUTPATIENT)
Dept: GASTROENTEROLOGY | Facility: CLINIC | Age: 50
End: 2025-08-27
Payer: COMMERCIAL

## (undated) DEVICE — GLOVE BIOGEL PI ULTRATOUCH G SZ 7.5 42175

## (undated) DEVICE — PACK CYSTO CUSTOM ASC

## (undated) DEVICE — CATH URETERAL OPEN END 5FRX70CM M0064002010

## (undated) DEVICE — GUIDEWIRE SENSOR DUAL FLEX STR 0.035"X150CM M0066703080

## (undated) DEVICE — KIT ENDO FIRST STEP DISINFECTANT 200ML W/POUCH EP-4

## (undated) DEVICE — SOL NACL 0.9% IRRIG 500ML BOTTLE 2F7123

## (undated) DEVICE — DRAPE C-ARM W/STRAPS 42X72" 07-CA104

## (undated) DEVICE — PREP CHLORAPREP 26ML TINTED ORANGE  260815

## (undated) DEVICE — SOL WATER IRRIG 500ML BOTTLE 2F7113

## (undated) DEVICE — RAD RX CONRAY 60% (50ML) CHARGE PER ML

## (undated) DEVICE — LIFTER SURGICAL ASCENDO SUBMUCOSAL LIFT AGENT BX00712934

## (undated) DEVICE — GLOVE PROTEXIS BLUE W/NEU-THERA 7.5  2D73EB75

## (undated) DEVICE — LINEN TOWEL PACK X5 5464

## (undated) DEVICE — LASER FIBER FLEXIVA PULSE 242 M006L8405910

## (undated) DEVICE — SPECIMEN CONTAINER 5OZ STERILE 2600SA

## (undated) DEVICE — BASKET NITINOL TIPLESS HALO  1.5FRX120CM 554120

## (undated) DEVICE — SOL NACL 0.9% IRRIG 3000ML BAG 2B7477

## (undated) DEVICE — SHEATH URETERAL ACCESS NAVIGATOR HD 11/13FRX36CM M0062502220

## (undated) DEVICE — TUBING SET THERMEDX UROLOGY SGL USE LL0006

## (undated) DEVICE — SUCTION MANIFOLD NEPTUNE 2 SYS 1 PORT 702-025-000

## (undated) DEVICE — SOL WATER IRRIG 1000ML BOTTLE 07139-09

## (undated) DEVICE — PAD CHUX UNDERPAD 30X30"

## (undated) DEVICE — GLOVE PROTEXIS W/NEU-THERA 7.5  2D73TE75

## (undated) RX ORDER — FENTANYL CITRATE 50 UG/ML
INJECTION, SOLUTION INTRAMUSCULAR; INTRAVENOUS
Status: DISPENSED
Start: 2022-07-20

## (undated) RX ORDER — EPHEDRINE SULFATE 50 MG/ML
INJECTION, SOLUTION INTRAMUSCULAR; INTRAVENOUS; SUBCUTANEOUS
Status: DISPENSED
Start: 2022-07-20

## (undated) RX ORDER — PROPOFOL 10 MG/ML
INJECTION, EMULSION INTRAVENOUS
Status: DISPENSED
Start: 2022-07-20

## (undated) RX ORDER — LEVOFLOXACIN 5 MG/ML
INJECTION, SOLUTION INTRAVENOUS
Status: DISPENSED
Start: 2022-07-20

## (undated) RX ORDER — DEXAMETHASONE SODIUM PHOSPHATE 4 MG/ML
INJECTION, SOLUTION INTRA-ARTICULAR; INTRALESIONAL; INTRAMUSCULAR; INTRAVENOUS; SOFT TISSUE
Status: DISPENSED
Start: 2022-07-20

## (undated) RX ORDER — ACETAMINOPHEN 325 MG/1
TABLET ORAL
Status: DISPENSED
Start: 2022-07-20

## (undated) RX ORDER — FENTANYL CITRATE 50 UG/ML
INJECTION, SOLUTION INTRAMUSCULAR; INTRAVENOUS
Status: DISPENSED
Start: 2025-08-12

## (undated) RX ORDER — LIDOCAINE HYDROCHLORIDE 20 MG/ML
INJECTION, SOLUTION EPIDURAL; INFILTRATION; INTRACAUDAL; PERINEURAL
Status: DISPENSED
Start: 2022-07-20

## (undated) RX ORDER — ONDANSETRON 2 MG/ML
INJECTION INTRAMUSCULAR; INTRAVENOUS
Status: DISPENSED
Start: 2022-07-20

## (undated) RX ORDER — GLYCOPYRROLATE 0.2 MG/ML
INJECTION INTRAMUSCULAR; INTRAVENOUS
Status: DISPENSED
Start: 2022-07-20